# Patient Record
Sex: FEMALE | Race: WHITE | Employment: OTHER | URBAN - METROPOLITAN AREA
[De-identification: names, ages, dates, MRNs, and addresses within clinical notes are randomized per-mention and may not be internally consistent; named-entity substitution may affect disease eponyms.]

---

## 2022-03-28 ENCOUNTER — OFFICE VISIT (OUTPATIENT)
Dept: INTERNAL MEDICINE CLINIC | Age: 67
End: 2022-03-28
Payer: MEDICARE

## 2022-03-28 VITALS
SYSTOLIC BLOOD PRESSURE: 118 MMHG | WEIGHT: 238.8 LBS | HEART RATE: 133 BPM | BODY MASS INDEX: 42.31 KG/M2 | DIASTOLIC BLOOD PRESSURE: 70 MMHG | HEIGHT: 63 IN

## 2022-03-28 DIAGNOSIS — K21.00 GASTROESOPHAGEAL REFLUX DISEASE WITH ESOPHAGITIS WITHOUT HEMORRHAGE: ICD-10-CM

## 2022-03-28 DIAGNOSIS — K43.9 VENTRAL HERNIA WITHOUT OBSTRUCTION OR GANGRENE: ICD-10-CM

## 2022-03-28 DIAGNOSIS — E78.2 MIXED HYPERLIPIDEMIA: ICD-10-CM

## 2022-03-28 DIAGNOSIS — M81.0 AGE-RELATED OSTEOPOROSIS WITHOUT CURRENT PATHOLOGICAL FRACTURE: ICD-10-CM

## 2022-03-28 DIAGNOSIS — M06.9 RHEUMATOID ARTHRITIS INVOLVING MULTIPLE SITES, UNSPECIFIED WHETHER RHEUMATOID FACTOR PRESENT (HCC): ICD-10-CM

## 2022-03-28 DIAGNOSIS — Z23 NEED FOR 23-POLYVALENT PNEUMOCOCCAL POLYSACCHARIDE VACCINE: ICD-10-CM

## 2022-03-28 DIAGNOSIS — J30.2 SEASONAL ALLERGIC RHINITIS, UNSPECIFIED TRIGGER: ICD-10-CM

## 2022-03-28 DIAGNOSIS — Z12.11 COLON CANCER SCREENING: ICD-10-CM

## 2022-03-28 DIAGNOSIS — R73.01 IMPAIRED FASTING BLOOD SUGAR: ICD-10-CM

## 2022-03-28 DIAGNOSIS — Z12.31 BREAST CANCER SCREENING BY MAMMOGRAM: ICD-10-CM

## 2022-03-28 DIAGNOSIS — L98.9 SKIN LESION OF FACE: Primary | ICD-10-CM

## 2022-03-28 PROBLEM — Z86.59 HISTORY OF POSTTRAUMATIC STRESS DISORDER (PTSD): Status: ACTIVE | Noted: 2022-03-28

## 2022-03-28 LAB
A/G RATIO: 1.5 (ref 1.1–2.2)
ALBUMIN SERPL-MCNC: 4.2 G/DL (ref 3.4–5)
ALP BLD-CCNC: 100 U/L (ref 40–129)
ALT SERPL-CCNC: 15 U/L (ref 10–40)
ANION GAP SERPL CALCULATED.3IONS-SCNC: 18 MMOL/L (ref 3–16)
AST SERPL-CCNC: 13 U/L (ref 15–37)
BILIRUB SERPL-MCNC: 0.8 MG/DL (ref 0–1)
BUN BLDV-MCNC: 17 MG/DL (ref 7–20)
CALCIUM SERPL-MCNC: 9.3 MG/DL (ref 8.3–10.6)
CHLORIDE BLD-SCNC: 102 MMOL/L (ref 99–110)
CHOLESTEROL, TOTAL: 248 MG/DL (ref 0–199)
CO2: 19 MMOL/L (ref 21–32)
CREAT SERPL-MCNC: 1 MG/DL (ref 0.6–1.2)
GFR AFRICAN AMERICAN: >60
GFR NON-AFRICAN AMERICAN: 55
GLUCOSE BLD-MCNC: 139 MG/DL (ref 70–99)
HCT VFR BLD CALC: 46.8 % (ref 36–48)
HDLC SERPL-MCNC: 49 MG/DL (ref 40–60)
HEMOGLOBIN: 15.3 G/DL (ref 12–16)
HEPATITIS C ANTIBODY INTERPRETATION: NORMAL
LDL CHOLESTEROL CALCULATED: 169 MG/DL
MCH RBC QN AUTO: 27.5 PG (ref 26–34)
MCHC RBC AUTO-ENTMCNC: 32.7 G/DL (ref 31–36)
MCV RBC AUTO: 84.1 FL (ref 80–100)
PDW BLD-RTO: 14.6 % (ref 12.4–15.4)
PLATELET # BLD: 205 K/UL (ref 135–450)
PMV BLD AUTO: 8.8 FL (ref 5–10.5)
POTASSIUM SERPL-SCNC: 4.2 MMOL/L (ref 3.5–5.1)
RBC # BLD: 5.56 M/UL (ref 4–5.2)
SODIUM BLD-SCNC: 139 MMOL/L (ref 136–145)
TOTAL PROTEIN: 7 G/DL (ref 6.4–8.2)
TRIGL SERPL-MCNC: 148 MG/DL (ref 0–150)
TSH REFLEX FT4: 2.73 UIU/ML (ref 0.27–4.2)
VLDLC SERPL CALC-MCNC: 30 MG/DL
WBC # BLD: 14.5 K/UL (ref 4–11)

## 2022-03-28 PROCEDURE — 1090F PRES/ABSN URINE INCON ASSESS: CPT | Performed by: INTERNAL MEDICINE

## 2022-03-28 PROCEDURE — G8417 CALC BMI ABV UP PARAM F/U: HCPCS | Performed by: INTERNAL MEDICINE

## 2022-03-28 PROCEDURE — G8400 PT W/DXA NO RESULTS DOC: HCPCS | Performed by: INTERNAL MEDICINE

## 2022-03-28 PROCEDURE — G8427 DOCREV CUR MEDS BY ELIG CLIN: HCPCS | Performed by: INTERNAL MEDICINE

## 2022-03-28 PROCEDURE — 1036F TOBACCO NON-USER: CPT | Performed by: INTERNAL MEDICINE

## 2022-03-28 PROCEDURE — 1123F ACP DISCUSS/DSCN MKR DOCD: CPT | Performed by: INTERNAL MEDICINE

## 2022-03-28 PROCEDURE — 90732 PPSV23 VACC 2 YRS+ SUBQ/IM: CPT | Performed by: INTERNAL MEDICINE

## 2022-03-28 PROCEDURE — G0009 ADMIN PNEUMOCOCCAL VACCINE: HCPCS | Performed by: INTERNAL MEDICINE

## 2022-03-28 PROCEDURE — 3017F COLORECTAL CA SCREEN DOC REV: CPT | Performed by: INTERNAL MEDICINE

## 2022-03-28 PROCEDURE — 4040F PNEUMOC VAC/ADMIN/RCVD: CPT | Performed by: INTERNAL MEDICINE

## 2022-03-28 PROCEDURE — G8484 FLU IMMUNIZE NO ADMIN: HCPCS | Performed by: INTERNAL MEDICINE

## 2022-03-28 PROCEDURE — 99204 OFFICE O/P NEW MOD 45 MIN: CPT | Performed by: INTERNAL MEDICINE

## 2022-03-28 RX ORDER — NICOTINE POLACRILEX 4 MG/1
20 GUM, CHEWING ORAL NIGHTLY
COMMUNITY

## 2022-03-28 SDOH — ECONOMIC STABILITY: FOOD INSECURITY: WITHIN THE PAST 12 MONTHS, THE FOOD YOU BOUGHT JUST DIDN'T LAST AND YOU DIDN'T HAVE MONEY TO GET MORE.: NEVER TRUE

## 2022-03-28 SDOH — ECONOMIC STABILITY: FOOD INSECURITY: WITHIN THE PAST 12 MONTHS, YOU WORRIED THAT YOUR FOOD WOULD RUN OUT BEFORE YOU GOT MONEY TO BUY MORE.: NEVER TRUE

## 2022-03-28 ASSESSMENT — PATIENT HEALTH QUESTIONNAIRE - PHQ9
SUM OF ALL RESPONSES TO PHQ QUESTIONS 1-9: 0
1. LITTLE INTEREST OR PLEASURE IN DOING THINGS: 0
SUM OF ALL RESPONSES TO PHQ9 QUESTIONS 1 & 2: 0
SUM OF ALL RESPONSES TO PHQ QUESTIONS 1-9: 0
SUM OF ALL RESPONSES TO PHQ QUESTIONS 1-9: 0
2. FEELING DOWN, DEPRESSED OR HOPELESS: 0
SUM OF ALL RESPONSES TO PHQ QUESTIONS 1-9: 0

## 2022-03-28 ASSESSMENT — ENCOUNTER SYMPTOMS
CHEST TIGHTNESS: 0
WHEEZING: 0
SHORTNESS OF BREATH: 0
SORE THROAT: 0
ABDOMINAL PAIN: 0
BACK PAIN: 0
COLOR CHANGE: 0
RHINORRHEA: 1
VOMITING: 0
NAUSEA: 0
COUGH: 1
CONSTIPATION: 0

## 2022-03-28 ASSESSMENT — SOCIAL DETERMINANTS OF HEALTH (SDOH): HOW HARD IS IT FOR YOU TO PAY FOR THE VERY BASICS LIKE FOOD, HOUSING, MEDICAL CARE, AND HEATING?: NOT HARD AT ALL

## 2022-03-28 NOTE — ASSESSMENT & PLAN NOTE
Advised patient to start plain antihistamine therapy as cetirizine or loratadine and avoid decongestants like Zyrtec since the side effects she is experiencing are secondary to decongestants and not antihistamine.   Advised can also step up treatment by adding the nasal steroids if needed and to call the office if any new problems or concerns

## 2022-03-28 NOTE — ASSESSMENT & PLAN NOTE
This is chronic and asymptomatic however has been progressing with the weight gain. Patient wants to wait for now until she is done with the treatment of her possibly skin cancer before addressing the hernia.   Aware of possible complications as strangulation that will usually result in acute symptoms and will go to the emergency room should any develop

## 2022-03-28 NOTE — ASSESSMENT & PLAN NOTE
Will check labs, continue current dose of omeprazole 20 mg since relieving the symptoms, GERD lifestyle modification changes along with antireflux diet and need for weight loss recommended

## 2022-03-28 NOTE — PROGRESS NOTES
ASSESSMENT/PLAN:  1. Skin lesion of face  Assessment & Plan:   Explained to patient lesion suggestive of most likely basal cell carcinoma, she is scheduled to see dermatology in 6 weeks, recommend to use sunscreen when out in the sun and to follow-up with dermatology for further evaluation and treatment  2. Rheumatoid arthritis involving multiple sites, unspecified whether rheumatoid factor present [facility])  Assessment & Plan:   Currently stable and patient is coping with the symptoms, discussed with patient recommendations for weight loss and encouraged to call the office if she feels she needs reevaluation by rheumatology to consider treatment with the newer biological agents that are now available. Can continue as needed Tylenol and or NSAIDs  Orders:  -     CBC; Future  -     Comprehensive Metabolic Panel; Future  -     Hepatitis C Antibody; Future  3. Ventral hernia without obstruction or gangrene  Assessment & Plan: This is chronic and asymptomatic however has been progressing with the weight gain. Patient wants to wait for now until she is done with the treatment of her possibly skin cancer before addressing the hernia. Aware of possible complications as strangulation that will usually result in acute symptoms and will go to the emergency room should any develop  4. Seasonal allergic rhinitis, unspecified trigger  Assessment & Plan:   Advised patient to start plain antihistamine therapy as cetirizine or loratadine and avoid decongestants like Zyrtec since the side effects she is experiencing are secondary to decongestants and not antihistamine. Advised can also step up treatment by adding the nasal steroids if needed and to call the office if any new problems or concerns  5. Mixed hyperlipidemia  Assessment & Plan:    We will update labs and make recommendations accordingly, advised to maintain healthy diet along with regular exercise and attempt weight loss  Orders:  -     Comprehensive Metabolic Panel; heartburn, if she skips a dose she ends up vomiting  Noticed the skin lesion on the side of the nose few months back, states it will never heal completely and is every time it scabs over the dry scab will fall off. She has history of chronic insomnia , history of PTSD with 2 suicidal attempts in the past however this has been all clear now  She does have seasonal allergies that are currently flaring up, she has not been taking any allergy medications because they give her tremors. Review of Systems   Constitutional: Negative for activity change, appetite change, fatigue and unexpected weight change. HENT: Positive for congestion and rhinorrhea. Negative for hearing loss, mouth sores and sore throat. Eyes: Negative for visual disturbance. Respiratory: Positive for cough. Negative for chest tightness, shortness of breath and wheezing. Cardiovascular: Negative for chest pain, palpitations and leg swelling. Gastrointestinal: Negative for abdominal pain, constipation, nausea and vomiting. Endocrine: Negative for cold intolerance and heat intolerance. Genitourinary: Negative for difficulty urinating, dysuria, frequency, hematuria, urgency and vaginal bleeding. Musculoskeletal: Positive for arthralgias. Negative for back pain, gait problem, joint swelling and neck pain. Skin: Negative for color change and wound. Allergic/Immunologic: Positive for environmental allergies. Negative for immunocompromised state. Neurological: Negative for dizziness, weakness, light-headedness and headaches. Hematological: Does not bruise/bleed easily. Psychiatric/Behavioral: Positive for sleep disturbance. Negative for behavioral problems and dysphoric mood. OBJECTIVE:    /70   Pulse 133   Ht 5' 3\" (1.6 m)   Wt 238 lb 12.8 oz (108.3 kg)   BMI 42.30 kg/m²    Physical Exam  Constitutional:       General: She is not in acute distress. Appearance: Normal appearance. She is obese.  She is not toxic-appearing. HENT:      Head: Normocephalic. Eyes:      Conjunctiva/sclera: Conjunctivae normal.   Cardiovascular:      Rate and Rhythm: Normal rate and regular rhythm. Heart sounds: Normal heart sounds. Pulmonary:      Effort: Pulmonary effort is normal. No respiratory distress. Breath sounds: Normal breath sounds. No wheezing. Abdominal:      Palpations: Abdomen is soft. Hernia: A hernia is present. Comments: Large right side paraumbilical hernia that is nonreducible however nontender. Exam limited by obesity   Musculoskeletal:         General: Normal range of motion. Cervical back: Neck supple. Skin:     General: Skin is warm and dry. Findings: Lesion present. Comments: Skin lesion lateral aspect of right nostril consistent with basal cell carcinoma grossly   Neurological:      General: No focal deficit present. Mental Status: She is alert. Cranial Nerves: No cranial nerve deficit. Psychiatric:         Mood and Affect: Mood normal.         Behavior: Behavior normal.         Thought Content: Thought content normal.           Electronically signed by Antonio Cruz MD on 3/28/2022 at 10:09 AM.    This dictation was generated by voice recognition computer software. Although all attempts are made to edit the dictation for accuracy, there may be errors in the transcription that are not intended.

## 2022-03-28 NOTE — ASSESSMENT & PLAN NOTE
We will update labs and make recommendations accordingly, advised to maintain healthy diet along with regular exercise and attempt weight loss

## 2022-03-28 NOTE — ASSESSMENT & PLAN NOTE
Explained to patient lesion suggestive of most likely basal cell carcinoma, she is scheduled to see dermatology in 6 weeks, recommend to use sunscreen when out in the sun and to follow-up with dermatology for further evaluation and treatment

## 2022-03-28 NOTE — ASSESSMENT & PLAN NOTE
Currently stable and patient is coping with the symptoms, discussed with patient recommendations for weight loss and encouraged to call the office if she feels she needs reevaluation by rheumatology to consider treatment with the newer biological agents that are now available.   Can continue as needed Tylenol and or NSAIDs

## 2022-03-29 DIAGNOSIS — D72.829 LEUKOCYTOSIS, UNSPECIFIED TYPE: Primary | ICD-10-CM

## 2022-03-29 LAB
ESTIMATED AVERAGE GLUCOSE: 131.2 MG/DL
HBA1C MFR BLD: 6.2 %

## 2022-04-02 ENCOUNTER — APPOINTMENT (OUTPATIENT)
Dept: CT IMAGING | Age: 67
DRG: 683 | End: 2022-04-02
Payer: MEDICARE

## 2022-04-02 ENCOUNTER — HOSPITAL ENCOUNTER (INPATIENT)
Age: 67
LOS: 5 days | Discharge: HOME | DRG: 683 | End: 2022-04-07
Attending: FAMILY MEDICINE | Admitting: FAMILY MEDICINE
Payer: MEDICARE

## 2022-04-02 DIAGNOSIS — R11.2 NAUSEA VOMITING AND DIARRHEA: ICD-10-CM

## 2022-04-02 DIAGNOSIS — R80.9 PROTEINURIA, UNSPECIFIED TYPE: ICD-10-CM

## 2022-04-02 DIAGNOSIS — N30.00 ACUTE CYSTITIS WITHOUT HEMATURIA: ICD-10-CM

## 2022-04-02 DIAGNOSIS — R10.84 GENERALIZED ABDOMINAL PAIN: ICD-10-CM

## 2022-04-02 DIAGNOSIS — R19.7 NAUSEA VOMITING AND DIARRHEA: ICD-10-CM

## 2022-04-02 DIAGNOSIS — E86.0 SEVERE DEHYDRATION: ICD-10-CM

## 2022-04-02 DIAGNOSIS — N17.9 ACUTE RENAL FAILURE, UNSPECIFIED ACUTE RENAL FAILURE TYPE (HCC): Primary | ICD-10-CM

## 2022-04-02 PROBLEM — N19 RENAL FAILURE: Status: ACTIVE | Noted: 2022-04-02

## 2022-04-02 LAB
A/G RATIO: 1.4 (ref 1.1–2.2)
ALBUMIN SERPL-MCNC: 3.4 G/DL (ref 3.4–5)
ALBUMIN SERPL-MCNC: 4.6 G/DL (ref 3.4–5)
ALP BLD-CCNC: 93 U/L (ref 40–129)
ALT SERPL-CCNC: 20 U/L (ref 10–40)
ANION GAP SERPL CALCULATED.3IONS-SCNC: 25 MMOL/L (ref 3–16)
ANION GAP SERPL CALCULATED.3IONS-SCNC: 29 MMOL/L (ref 3–16)
AST SERPL-CCNC: 12 U/L (ref 15–37)
BACTERIA: ABNORMAL /HPF
BASOPHILS ABSOLUTE: 0 K/UL (ref 0–0.2)
BASOPHILS RELATIVE PERCENT: 0.2 %
BILIRUB SERPL-MCNC: 0.6 MG/DL (ref 0–1)
BILIRUBIN URINE: NEGATIVE
BLOOD, URINE: ABNORMAL
BUN BLDV-MCNC: 108 MG/DL (ref 7–20)
BUN BLDV-MCNC: 116 MG/DL (ref 7–20)
CALCIUM SERPL-MCNC: 7.5 MG/DL (ref 8.3–10.6)
CALCIUM SERPL-MCNC: 8.4 MG/DL (ref 8.3–10.6)
CHLORIDE BLD-SCNC: 86 MMOL/L (ref 99–110)
CHLORIDE BLD-SCNC: 89 MMOL/L (ref 99–110)
CLARITY: ABNORMAL
CO2: 14 MMOL/L (ref 21–32)
CO2: 16 MMOL/L (ref 21–32)
COLOR: YELLOW
CREAT SERPL-MCNC: 6.6 MG/DL (ref 0.6–1.2)
CREAT SERPL-MCNC: 7.1 MG/DL (ref 0.6–1.2)
EOSINOPHILS ABSOLUTE: 0 K/UL (ref 0–0.6)
EOSINOPHILS RELATIVE PERCENT: 0.1 %
EPITHELIAL CELLS, UA: 1 /HPF (ref 0–5)
GFR AFRICAN AMERICAN: 7
GFR AFRICAN AMERICAN: 8
GFR NON-AFRICAN AMERICAN: 6
GFR NON-AFRICAN AMERICAN: 6
GLUCOSE BLD-MCNC: 128 MG/DL (ref 70–99)
GLUCOSE BLD-MCNC: 185 MG/DL (ref 70–99)
GLUCOSE URINE: NEGATIVE MG/DL
HCT VFR BLD CALC: 51.1 % (ref 36–48)
HEMOGLOBIN: 17.3 G/DL (ref 12–16)
HYALINE CASTS: 6 /LPF (ref 0–8)
KETONES, URINE: NEGATIVE MG/DL
LEUKOCYTE ESTERASE, URINE: ABNORMAL
LIPASE: 594 U/L (ref 13–60)
LIPASE: 689 U/L (ref 13–60)
LYMPHOCYTES ABSOLUTE: 1.4 K/UL (ref 1–5.1)
LYMPHOCYTES RELATIVE PERCENT: 10.6 %
MAGNESIUM: 2 MG/DL (ref 1.8–2.4)
MCH RBC QN AUTO: 27.4 PG (ref 26–34)
MCHC RBC AUTO-ENTMCNC: 33.9 G/DL (ref 31–36)
MCV RBC AUTO: 80.8 FL (ref 80–100)
MICROSCOPIC EXAMINATION: YES
MONOCYTES ABSOLUTE: 1 K/UL (ref 0–1.3)
MONOCYTES RELATIVE PERCENT: 7.8 %
NEUTROPHILS ABSOLUTE: 10.8 K/UL (ref 1.7–7.7)
NEUTROPHILS RELATIVE PERCENT: 81.3 %
NITRITE, URINE: POSITIVE
PDW BLD-RTO: 14.1 % (ref 12.4–15.4)
PH UA: 5 (ref 5–8)
PHOSPHORUS: 7.9 MG/DL (ref 2.5–4.9)
PLATELET # BLD: 368 K/UL (ref 135–450)
PMV BLD AUTO: 8.9 FL (ref 5–10.5)
POTASSIUM REFLEX MAGNESIUM: 3.2 MMOL/L (ref 3.5–5.1)
POTASSIUM SERPL-SCNC: 2.9 MMOL/L (ref 3.5–5.1)
PROTEIN UA: 30 MG/DL
RBC # BLD: 6.32 M/UL (ref 4–5.2)
RBC UA: ABNORMAL /HPF (ref 0–4)
SODIUM BLD-SCNC: 129 MMOL/L (ref 136–145)
SODIUM BLD-SCNC: 130 MMOL/L (ref 136–145)
SPECIFIC GRAVITY UA: 1.02 (ref 1–1.03)
TOTAL PROTEIN: 8 G/DL (ref 6.4–8.2)
URINE REFLEX TO CULTURE: YES
URINE TYPE: ABNORMAL
UROBILINOGEN, URINE: 0.2 E.U./DL
WBC # BLD: 13.3 K/UL (ref 4–11)
WBC UA: 94 /HPF (ref 0–5)

## 2022-04-02 PROCEDURE — 6360000004 HC RX CONTRAST MEDICATION: Performed by: NURSE PRACTITIONER

## 2022-04-02 PROCEDURE — 2580000003 HC RX 258: Performed by: FAMILY MEDICINE

## 2022-04-02 PROCEDURE — 36415 COLL VENOUS BLD VENIPUNCTURE: CPT

## 2022-04-02 PROCEDURE — 96374 THER/PROPH/DIAG INJ IV PUSH: CPT

## 2022-04-02 PROCEDURE — 81001 URINALYSIS AUTO W/SCOPE: CPT

## 2022-04-02 PROCEDURE — 2060000000 HC ICU INTERMEDIATE R&B

## 2022-04-02 PROCEDURE — 6360000002 HC RX W HCPCS: Performed by: NURSE PRACTITIONER

## 2022-04-02 PROCEDURE — 85025 COMPLETE CBC W/AUTO DIFF WBC: CPT

## 2022-04-02 PROCEDURE — 99285 EMERGENCY DEPT VISIT HI MDM: CPT

## 2022-04-02 PROCEDURE — 96361 HYDRATE IV INFUSION ADD-ON: CPT

## 2022-04-02 PROCEDURE — 82040 ASSAY OF SERUM ALBUMIN: CPT

## 2022-04-02 PROCEDURE — 83690 ASSAY OF LIPASE: CPT

## 2022-04-02 PROCEDURE — 87186 SC STD MICRODIL/AGAR DIL: CPT

## 2022-04-02 PROCEDURE — U0005 INFEC AGEN DETEC AMPLI PROBE: HCPCS

## 2022-04-02 PROCEDURE — 96375 TX/PRO/DX INJ NEW DRUG ADDON: CPT

## 2022-04-02 PROCEDURE — 80053 COMPREHEN METABOLIC PANEL: CPT

## 2022-04-02 PROCEDURE — 6370000000 HC RX 637 (ALT 250 FOR IP): Performed by: FAMILY MEDICINE

## 2022-04-02 PROCEDURE — 83735 ASSAY OF MAGNESIUM: CPT

## 2022-04-02 PROCEDURE — 51702 INSERT TEMP BLADDER CATH: CPT

## 2022-04-02 PROCEDURE — 87086 URINE CULTURE/COLONY COUNT: CPT

## 2022-04-02 PROCEDURE — 6360000002 HC RX W HCPCS: Performed by: FAMILY MEDICINE

## 2022-04-02 PROCEDURE — U0003 INFECTIOUS AGENT DETECTION BY NUCLEIC ACID (DNA OR RNA); SEVERE ACUTE RESPIRATORY SYNDROME CORONAVIRUS 2 (SARS-COV-2) (CORONAVIRUS DISEASE [COVID-19]), AMPLIFIED PROBE TECHNIQUE, MAKING USE OF HIGH THROUGHPUT TECHNOLOGIES AS DESCRIBED BY CMS-2020-01-R: HCPCS

## 2022-04-02 PROCEDURE — 84100 ASSAY OF PHOSPHORUS: CPT

## 2022-04-02 PROCEDURE — 87077 CULTURE AEROBIC IDENTIFY: CPT

## 2022-04-02 PROCEDURE — 74177 CT ABD & PELVIS W/CONTRAST: CPT

## 2022-04-02 PROCEDURE — 2580000003 HC RX 258: Performed by: NURSE PRACTITIONER

## 2022-04-02 RX ORDER — SODIUM CHLORIDE 0.9 % (FLUSH) 0.9 %
5-40 SYRINGE (ML) INJECTION EVERY 12 HOURS SCHEDULED
Status: DISCONTINUED | OUTPATIENT
Start: 2022-04-02 | End: 2022-04-07 | Stop reason: HOSPADM

## 2022-04-02 RX ORDER — SODIUM CHLORIDE 9 MG/ML
INJECTION, SOLUTION INTRAVENOUS PRN
Status: DISCONTINUED | OUTPATIENT
Start: 2022-04-02 | End: 2022-04-07 | Stop reason: HOSPADM

## 2022-04-02 RX ORDER — SODIUM CHLORIDE, SODIUM LACTATE, POTASSIUM CHLORIDE, CALCIUM CHLORIDE 600; 310; 30; 20 MG/100ML; MG/100ML; MG/100ML; MG/100ML
INJECTION, SOLUTION INTRAVENOUS CONTINUOUS
Status: DISCONTINUED | OUTPATIENT
Start: 2022-04-02 | End: 2022-04-03 | Stop reason: ALTCHOICE

## 2022-04-02 RX ORDER — ACETAMINOPHEN 650 MG/1
650 SUPPOSITORY RECTAL EVERY 6 HOURS PRN
Status: DISCONTINUED | OUTPATIENT
Start: 2022-04-02 | End: 2022-04-07 | Stop reason: HOSPADM

## 2022-04-02 RX ORDER — MONTELUKAST SODIUM 10 MG/1
10 TABLET ORAL NIGHTLY
COMMUNITY
End: 2022-06-08 | Stop reason: ALTCHOICE

## 2022-04-02 RX ORDER — SODIUM CHLORIDE, SODIUM LACTATE, POTASSIUM CHLORIDE, CALCIUM CHLORIDE 600; 310; 30; 20 MG/100ML; MG/100ML; MG/100ML; MG/100ML
1000 INJECTION, SOLUTION INTRAVENOUS ONCE
Status: COMPLETED | OUTPATIENT
Start: 2022-04-02 | End: 2022-04-02

## 2022-04-02 RX ORDER — MORPHINE SULFATE 4 MG/ML
4 INJECTION, SOLUTION INTRAMUSCULAR; INTRAVENOUS ONCE
Status: COMPLETED | OUTPATIENT
Start: 2022-04-02 | End: 2022-04-02

## 2022-04-02 RX ORDER — ONDANSETRON 2 MG/ML
4 INJECTION INTRAMUSCULAR; INTRAVENOUS ONCE
Status: COMPLETED | OUTPATIENT
Start: 2022-04-02 | End: 2022-04-02

## 2022-04-02 RX ORDER — PANTOPRAZOLE SODIUM 40 MG/1
40 TABLET, DELAYED RELEASE ORAL
Status: DISCONTINUED | OUTPATIENT
Start: 2022-04-03 | End: 2022-04-07 | Stop reason: HOSPADM

## 2022-04-02 RX ORDER — SODIUM CHLORIDE 0.9 % (FLUSH) 0.9 %
5-40 SYRINGE (ML) INJECTION PRN
Status: DISCONTINUED | OUTPATIENT
Start: 2022-04-02 | End: 2022-04-07 | Stop reason: HOSPADM

## 2022-04-02 RX ORDER — POLYETHYLENE GLYCOL 3350 17 G/17G
17 POWDER, FOR SOLUTION ORAL DAILY PRN
Status: DISCONTINUED | OUTPATIENT
Start: 2022-04-02 | End: 2022-04-07 | Stop reason: HOSPADM

## 2022-04-02 RX ORDER — ONDANSETRON 2 MG/ML
4 INJECTION INTRAMUSCULAR; INTRAVENOUS EVERY 6 HOURS PRN
Status: DISCONTINUED | OUTPATIENT
Start: 2022-04-02 | End: 2022-04-07 | Stop reason: HOSPADM

## 2022-04-02 RX ORDER — AMLODIPINE BESYLATE 5 MG/1
5 TABLET ORAL NIGHTLY
Status: DISCONTINUED | OUTPATIENT
Start: 2022-04-02 | End: 2022-04-07 | Stop reason: HOSPADM

## 2022-04-02 RX ORDER — ONDANSETRON 4 MG/1
4 TABLET, ORALLY DISINTEGRATING ORAL EVERY 8 HOURS PRN
Status: DISCONTINUED | OUTPATIENT
Start: 2022-04-02 | End: 2022-04-07 | Stop reason: HOSPADM

## 2022-04-02 RX ORDER — ACETAMINOPHEN 325 MG/1
650 TABLET ORAL EVERY 6 HOURS PRN
Status: DISCONTINUED | OUTPATIENT
Start: 2022-04-02 | End: 2022-04-07 | Stop reason: HOSPADM

## 2022-04-02 RX ORDER — HEPARIN SODIUM 5000 [USP'U]/ML
5000 INJECTION, SOLUTION INTRAVENOUS; SUBCUTANEOUS EVERY 8 HOURS SCHEDULED
Status: DISCONTINUED | OUTPATIENT
Start: 2022-04-02 | End: 2022-04-04

## 2022-04-02 RX ADMIN — HEPARIN SODIUM 5000 UNITS: 5000 INJECTION INTRAVENOUS; SUBCUTANEOUS at 17:00

## 2022-04-02 RX ADMIN — AMLODIPINE BESYLATE 5 MG: 5 TABLET ORAL at 22:39

## 2022-04-02 RX ADMIN — SODIUM CHLORIDE, POTASSIUM CHLORIDE, SODIUM LACTATE AND CALCIUM CHLORIDE 1000 ML: 600; 310; 30; 20 INJECTION, SOLUTION INTRAVENOUS at 10:36

## 2022-04-02 RX ADMIN — Medication 1000 MG: at 15:20

## 2022-04-02 RX ADMIN — ONDANSETRON 4 MG: 2 INJECTION INTRAMUSCULAR; INTRAVENOUS at 10:28

## 2022-04-02 RX ADMIN — HEPARIN SODIUM 5000 UNITS: 5000 INJECTION INTRAVENOUS; SUBCUTANEOUS at 22:39

## 2022-04-02 RX ADMIN — MORPHINE SULFATE 4 MG: 4 INJECTION INTRAVENOUS at 10:28

## 2022-04-02 RX ADMIN — SODIUM CHLORIDE, POTASSIUM CHLORIDE, SODIUM LACTATE AND CALCIUM CHLORIDE 1000 ML: 600; 310; 30; 20 INJECTION, SOLUTION INTRAVENOUS at 13:31

## 2022-04-02 RX ADMIN — ONDANSETRON 4 MG: 2 INJECTION INTRAMUSCULAR; INTRAVENOUS at 20:59

## 2022-04-02 RX ADMIN — ACETAMINOPHEN 650 MG: 325 TABLET ORAL at 20:51

## 2022-04-02 RX ADMIN — SODIUM CHLORIDE, POTASSIUM CHLORIDE, SODIUM LACTATE AND CALCIUM CHLORIDE: 600; 310; 30; 20 INJECTION, SOLUTION INTRAVENOUS at 20:50

## 2022-04-02 RX ADMIN — SODIUM CHLORIDE, POTASSIUM CHLORIDE, SODIUM LACTATE AND CALCIUM CHLORIDE 150 ML/HR: 600; 310; 30; 20 INJECTION, SOLUTION INTRAVENOUS at 13:23

## 2022-04-02 RX ADMIN — IOPAMIDOL 75 ML: 755 INJECTION, SOLUTION INTRAVENOUS at 11:07

## 2022-04-02 ASSESSMENT — PAIN DESCRIPTION - LOCATION
LOCATION: ABDOMEN
LOCATION: ABDOMEN

## 2022-04-02 ASSESSMENT — PAIN SCALES - GENERAL
PAINLEVEL_OUTOF10: 7
PAINLEVEL_OUTOF10: 0
PAINLEVEL_OUTOF10: 7

## 2022-04-02 ASSESSMENT — ENCOUNTER SYMPTOMS
COUGH: 0
NAUSEA: 1
SHORTNESS OF BREATH: 0
ABDOMINAL PAIN: 1
COLOR CHANGE: 0
VOMITING: 1
WHEEZING: 0
BACK PAIN: 0
DIARRHEA: 1

## 2022-04-02 ASSESSMENT — PAIN DESCRIPTION - PAIN TYPE
TYPE: ACUTE PAIN
TYPE: ACUTE PAIN

## 2022-04-02 NOTE — ED PROVIDER NOTES
**ADVANCED PRACTICE PROVIDER, I HAVE EVALUATED THIS PATIENT**        [address] ENCOUNTER      Pt Name: Blanca Bey  QVO:[id number]  [place] 1955  Date of evaluation: 4/2/2022  Provider: STEVEN Friedman CNP      Chief Complaint:    Chief Complaint   Patient presents with    Emesis     pt states started with sinus congestion a weeks ago, havn't eaten in 1 weeks due to emesis and diarrhea, has been able to drink pedialyte, pt states has hernia, now with abd pain         Nursing Notes, Past Medical Hx, Past Surgical Hx, Social Hx, Allergies, and Family Hx were all reviewed and agreed with or any disagreements were addressed in the HPI.    HPI: (Location, Duration, Timing, Severity, Quality, Assoc Sx, Context, Modifying factors)    Chief Complaint of abdominal cramping associate with nausea vomiting    This is a  74 y.o. female who presents today with sinus congestion that has been going on for the past few weeks, she was seen by new PCP on Monday, she was started on Singulair and over-the-counter cough medicine however, since Monday night she had nausea vomiting and diarrhea along with intermittent abdominal cramping, she states that she is throwing up and having diarrhea daily, about 5-6 episodes a day. She denies any chest pain pleuritic chest pain or shortness of breath. She states that she has a known abdominal hernia. She states that she is not been able to eat or drink, she tries to drink Pedialyte however is not really helping and she feels like she is feeling worse. She rates her abdominal pain a 7 out of 10, has not taking anything for the pain. She denies any additional complaints, does report the sinus congestion has improved over the past couple of days, she has no chest pain pleuritic chest pain or shortness of breath.   No known fever or chills, no urinary symptoms, no additional complaints, numbness medications, no additional aggravating or relieving factors. Patient presents awake, alert and in no acute distress or toxic appearance. PastMedical/Surgical History:      Diagnosis Date    PTSD (post-traumatic stress disorder)     Rheumatoid arthritis ([address]      History reviewed. No pertinent surgical history. Medications:  Previous Medications    OMEPRAZOLE 20 MG EC TABLET    Take 20 mg by mouth daily         Review of Systems:  (2-9 systems needed)  Review of Systems   Constitutional: Negative for chills and fever. HENT: Negative for congestion. Patient states she is been dealing with sinus congestion off and on since the past couple of weeks, denies any headache neck pain or neck stiffness   Respiratory: Negative for cough, shortness of breath and wheezing. Cardiovascular: Negative for chest pain. Gastrointestinal: Positive for abdominal pain, diarrhea, nausea and vomiting. However, since Tuesday she has been with intermittent abdominal cramping associate with nausea vomiting, and diarrhea, states she has been vomiting or having diarrhea about 5-6 times a day. Genitourinary: Negative for difficulty urinating, dysuria, frequency, hematuria and urgency. Musculoskeletal: Negative for back pain. Skin: Negative for color change. Neurological: Negative for weakness, numbness and headaches. She does report feeling weak, fatigue, but denies any headache neck pain or neck stiffness       \"Positives and Pertinent negatives as per HPI\"    Physical Exam:  Physical Exam  Vitals and nursing note reviewed. Constitutional:       Appearance: She is well-developed. She is not diaphoretic. HENT:      Head: Normocephalic. Right Ear: External ear normal.      Left Ear: External ear normal.   Eyes:      General: No scleral icterus. Right eye: No discharge. Left eye: No discharge. Cardiovascular:      Rate and Rhythm: Normal rate.       Comments: Normal S1 and 2, peripheral pulses 2+, no edema of the  Pulmonary:      Effort: Pulmonary effort is normal. No respiratory distress. Breath sounds: Normal breath sounds. Abdominal:      Palpations: Abdomen is soft. Tenderness: There is abdominal tenderness. Comments: Abdomen is soft and nondistended. Bowel sounds are hypoactive, patient does have an umbilical hernia that is soft and reproducible however immediately returns outward, she is having abdominal cramping mostly in the epigastric region, no ascites or rigidity   Musculoskeletal:         General: Normal range of motion. Cervical back: Normal range of motion and neck supple. Skin:     General: Skin is warm. Capillary Refill: Capillary refill takes less than 2 seconds. Coloration: Skin is not pale. Neurological:      General: No focal deficit present. Mental Status: She is alert and oriented to person, place, and time. GCS: GCS eye subscore is 4. GCS verbal subscore is 5. GCS motor subscore is 6.       Comments: Patient is awake, alert following commands correctly, neurology intact no focal deficits   Psychiatric:         Behavior: Behavior normal.         MEDICAL DECISION MAKING    Vitals:    Vitals:    04/02/22 1130 04/02/22 1145 04/02/22 1200 04/02/22 1330   BP: (!) 150/110 (!) 187/119 (!) 148/91 (!) 143/87   Pulse: 73 73 78 81   Resp: 16 14 24 18   Temp:       TempSrc:       SpO2: 97% 97% 95% 95%   Weight:       Height:           LABS:  Labs Reviewed   CBC WITH AUTO DIFFERENTIAL - Abnormal; Notable for the following components:       Result Value    WBC 13.3 (*)     RBC 6.32 (*)     Hemoglobin 17.3 (*)     Hematocrit 51.1 (*)     Neutrophils Absolute 10.8 (*)     All other components within normal limits   COMPREHENSIVE METABOLIC PANEL W/ REFLEX TO MG FOR LOW K - Abnormal; Notable for the following components:    Sodium 129 (*)     Potassium reflex Magnesium 3.2 (*)     Chloride 86 (*)     CO2 14 (*)     Anion Gap 29 (*)     Glucose 185 (*)  (*)     CREATININE 7.1 (*)     GFR Non- 6 (*)     GFR  7 (*)     AST 12 (*)     All other components within normal limits    Narrative:     Hailey Ulloa  [facility] tel. [phone],  Chemistry results called to and read back by Jackson Jaime, 04/02/2022  10:55, by Sofia Sneed - Abnormal; Notable for the following components:    Lipase 689.0 (*)     All other components within normal limits    Narrative:     Hailey Ulloa  [facility] tel. [phone],  Chemistry results called to and read back by Jackson Jaime, 04/02/2022  10:55, by [employer] - Abnormal; Notable for the following components:    Blood, Urine MODERATE (*)     Protein, UA 30 (*)     Nitrite, Urine POSITIVE (*)     Leukocyte Esterase, Urine MODERATE (*)     All other components within normal limits   MICROSCOPIC URINALYSIS - Abnormal; Notable for the following components:    Bacteria, UA 4+ (*)     WBC, UA 94 (*)     All other components within normal limits   CULTURE, URINE   CULTURE, URINE   MAGNESIUM    Narrative:     Hailey Ulloa  [facility] tel. [phone],  Chemistry results called to and read back by Jackson Jaime, 04/02/2022  10:55, by Joe Stewart of labs reviewed and were negative at this time or not returned at the time of this note. RADIOLOGY:   Non-plain film images such as CT, Ultrasound and MRI are read by the radiologist. Noni ERAZO, APRN - CNP have directly visualized the radiologic plain film image(s) with the below findings:      Interpretation per the Radiologist below, if available at the time of this note:    CT ABDOMEN PELVIS W IV CONTRAST Additional Contrast? None   Final Result   1. Mild infectious or inflammatory enterocolitis to include inflammatory   bowel disease. 2. Abnormal endometrial thickening to 1.3 cm. Recommend further evaluation   with nonemergent pelvic ultrasound.               MEDICAL DECISION MAKING / ED COURSE:    Because of high probability of sudden clinical deterioration of the patient's condition and risk of further deterioration, critical care time required my full attention to the patient's condition; which included chart data review, documentation, medication ordering, reviewing the patient's old records, reevaluation patient's cardiac, pulmonary and neurological status. Reevaluation of vital signs. Consultations with ED attending and admitting physician. Ordering, interpreting reviewing diagnostic testing. Therefore a critical care time was 42 minutes of direct attention to the patient's condition did not include time spent on procedures. PROCEDURES:   Procedures    None    Patient was given:  Medications   lactated ringers infusion (150 mL/hr IntraVENous New Bag 4/2/22 1323)   cefTRIAXone (ROCEPHIN) 1000 mg in sterile water 10 mL IV syringe (has no administration in time range)   ondansetron (ZOFRAN) injection 4 mg (4 mg IntraVENous Given 4/2/22 1028)   morphine injection 4 mg (4 mg IntraVENous Given 4/2/22 1028)   lactated ringers infusion 1,000 mL (0 mLs IntraVENous Stopped 4/2/22 1200)   iopamidol (ISOVUE-370) 76 % injection 75 mL (75 mLs IntraVENous Given 4/2/22 1107)   lactated ringers infusion 1,000 mL (1,000 mLs IntraVENous New Bag 4/2/22 1331)     Patient presents with sinus congestion that has been going on for the past few weeks, she was seen by new PCP on Monday, she was started on Singulair and over-the-counter cough medicine however, since Monday night she had nausea vomiting and diarrhea along with intermittent abdominal cramping, she states that she is throwing up and having diarrhea daily, about 5-6 episodes a day. She denies any chest pain pleuritic chest pain or shortness of breath. She states that she has a known abdominal hernia. She states that she is not been able to eat or drink, she tries to drink Pedialyte.     After evaluation and examination the patient IV access, IV fluids, blood work, urinalysis, medications and CT scan of the abdomen was ordered. Urinalysis is positive for nitrates and proteinuria, 94 WBCs, patient was ordered IV Rocephin and urine sent for culturing. CBC shows a slight leukocytosis with a white count of 13,300, no acute sepsis. Patient's metabolic panel shows a new acute renal failure with a BUN of 116, creatinine 7.1, GFR of 6, her gap of 29, potassium 3.2 and sodium of 129 this would correlate with her chloride being 86, I then added on a mag level, I consulted nephrology due to her new acute renal failure as the patient just had normal kidney function on the March 28, 2022 at that time her BUN was 17, creatinine was 1 and GFR was 55 I do believe she is severely dehydrated, she was already ordered a liter of fluid. Her liver functions are normal except her lipase is 689, reflex magnesium level is 2.  CT the abdomen shows mild infectious or inflammatory enterocolitis possibly inflammatory bowel disease this is probably related to her nausea and vomiting and diarrhea she has been having however because of the new ARF consulting nephrology. At 1220 I spoke with Dr. Damari Davis, nephrology on-call, we discussed the patient's case at length, he agrees with plan of care the patient due to being admitted, he request patient have inserted Gutiérrez catheter monitor I&O, in addition to doing 150 mL an hour. I then spoke with nephrology again at [phone], he recommends that the patient should have 1 L that was already infused, second liter wide over 30 minutes, third liter run at 500 mL's an hour, fourth liter run at 150 mL's per hour and then on out in order to sustain kidney function. However, this was all ordered, along with indwelling Gutiérrez catheter. Hospitalist was paged via [facility] for admission. Patient will be admitted to hospital for further evaluation management of care. The patient tolerated their visit well. I evaluated the patient.   The physician was available for consultation as needed. The patient and / or the family were informed of the results of any tests, a time was given to answer questions, a plan was proposed and they agreed with plan. CLINICAL IMPRESSION:  1. Acute renal failure, unspecified acute renal failure type ([address]    2. Severe dehydration    3. Acute cystitis without hematuria    4. Proteinuria, unspecified type    5. Generalized abdominal pain    6. Nausea vomiting and diarrhea        DISPOSITION Admitted 04/02/2022 02:51:19 PM      PATIENT REFERRED TO:  No follow-up provider specified.     DISCHARGE MEDICATIONS:  New Prescriptions    No medications on file       DISCONTINUED MEDICATIONS:  Discontinued Medications    No medications on file              (Please note the MDM and HPI sections of this note were completed with a voice recognition program.  Efforts were made to edit the dictations but occasionally words are mis-transcribed.)    Electronically signed, STEVEN Acosta CNP,          STEVEN Acosta CNP  04/02/22 7100

## 2022-04-02 NOTE — PROGRESS NOTES
Called 3T for report to get patient upstairs- told that \"Nurse is supposed to get report at the bedside and she will be down there shortly. \"

## 2022-04-02 NOTE — LETTER
[address] [id number]  Phone: [phone]    No name on file. April 6, 2022     Patient: David Felipe   YOB: 1955   Date of Visit: 4/2/2022       To Whom It May Concern: It is my medical opinion that Inge Jara {Work release (duty restriction):67923}. If you have any questions or concerns, please don't hesitate to call. Sincerely,        No name on file.

## 2022-04-02 NOTE — PROGRESS NOTES
Pt admitted to [phone]. Plan of care discussed. Pt in CDIFF precaution due to admission with diarrhea. Bed alarm engaged, call light within reach, instructed to call with needs.

## 2022-04-02 NOTE — PROGRESS NOTES
Attempted to do bedside report with admitting RN and second RN. When I directly asked both of them, I was told, \"No,\" as they proceeded to take the patient through the double doors exiting the ED. ED Charge RN aware.

## 2022-04-03 LAB
ALBUMIN SERPL-MCNC: 3.3 G/DL (ref 3.4–5)
ANION GAP SERPL CALCULATED.3IONS-SCNC: 21 MMOL/L (ref 3–16)
ANION GAP SERPL CALCULATED.3IONS-SCNC: 23 MMOL/L (ref 3–16)
BUN BLDV-MCNC: 104 MG/DL (ref 7–20)
BUN BLDV-MCNC: 93 MG/DL (ref 7–20)
C DIFF TOXIN/ANTIGEN: NORMAL
CALCIUM SERPL-MCNC: 7.7 MG/DL (ref 8.3–10.6)
CALCIUM SERPL-MCNC: 7.8 MG/DL (ref 8.3–10.6)
CHLORIDE BLD-SCNC: 91 MMOL/L (ref 99–110)
CHLORIDE BLD-SCNC: 93 MMOL/L (ref 99–110)
CO2: 17 MMOL/L (ref 21–32)
CO2: 17 MMOL/L (ref 21–32)
CREAT SERPL-MCNC: 5.9 MG/DL (ref 0.6–1.2)
CREAT SERPL-MCNC: 6.6 MG/DL (ref 0.6–1.2)
GFR AFRICAN AMERICAN: 8
GFR AFRICAN AMERICAN: 9
GFR NON-AFRICAN AMERICAN: 6
GFR NON-AFRICAN AMERICAN: 7
GLUCOSE BLD-MCNC: 106 MG/DL (ref 70–99)
GLUCOSE BLD-MCNC: 115 MG/DL (ref 70–99)
HCT VFR BLD CALC: 41.8 % (ref 36–48)
HEMOGLOBIN: 14.2 G/DL (ref 12–16)
MAGNESIUM: 1.7 MG/DL (ref 1.8–2.4)
MCH RBC QN AUTO: 27.4 PG (ref 26–34)
MCHC RBC AUTO-ENTMCNC: 33.9 G/DL (ref 31–36)
MCV RBC AUTO: 80.7 FL (ref 80–100)
PDW BLD-RTO: 14.3 % (ref 12.4–15.4)
PHOSPHORUS: 7.5 MG/DL (ref 2.5–4.9)
PLATELET # BLD: 258 K/UL (ref 135–450)
PMV BLD AUTO: 8.8 FL (ref 5–10.5)
POTASSIUM REFLEX MAGNESIUM: 3 MMOL/L (ref 3.5–5.1)
POTASSIUM SERPL-SCNC: 2.9 MMOL/L (ref 3.5–5.1)
RBC # BLD: 5.18 M/UL (ref 4–5.2)
SODIUM BLD-SCNC: 131 MMOL/L (ref 136–145)
SODIUM BLD-SCNC: 131 MMOL/L (ref 136–145)
WBC # BLD: 6.8 K/UL (ref 4–11)

## 2022-04-03 PROCEDURE — 36410 VNPNXR 3YR/> PHY/QHP DX/THER: CPT

## 2022-04-03 PROCEDURE — 05HB33Z INSERTION OF INFUSION DEVICE INTO RIGHT BASILIC VEIN, PERCUTANEOUS APPROACH: ICD-10-PCS | Performed by: FAMILY MEDICINE

## 2022-04-03 PROCEDURE — 2580000003 HC RX 258: Performed by: FAMILY MEDICINE

## 2022-04-03 PROCEDURE — 6370000000 HC RX 637 (ALT 250 FOR IP): Performed by: PEDIATRICS

## 2022-04-03 PROCEDURE — 2580000003 HC RX 258: Performed by: INTERNAL MEDICINE

## 2022-04-03 PROCEDURE — 36569 INSJ PICC 5 YR+ W/O IMAGING: CPT

## 2022-04-03 PROCEDURE — 80069 RENAL FUNCTION PANEL: CPT

## 2022-04-03 PROCEDURE — 83735 ASSAY OF MAGNESIUM: CPT

## 2022-04-03 PROCEDURE — 85027 COMPLETE CBC AUTOMATED: CPT

## 2022-04-03 PROCEDURE — 6360000002 HC RX W HCPCS: Performed by: FAMILY MEDICINE

## 2022-04-03 PROCEDURE — 87040 BLOOD CULTURE FOR BACTERIA: CPT

## 2022-04-03 PROCEDURE — 2500000003 HC RX 250 WO HCPCS: Performed by: NURSE PRACTITIONER

## 2022-04-03 PROCEDURE — 2060000000 HC ICU INTERMEDIATE R&B

## 2022-04-03 PROCEDURE — 87449 NOS EACH ORGANISM AG IA: CPT

## 2022-04-03 PROCEDURE — 2500000003 HC RX 250 WO HCPCS: Performed by: INTERNAL MEDICINE

## 2022-04-03 PROCEDURE — C1751 CATH, INF, PER/CENT/MIDLINE: HCPCS

## 2022-04-03 PROCEDURE — 87505 NFCT AGENT DETECTION GI: CPT

## 2022-04-03 PROCEDURE — 36415 COLL VENOUS BLD VENIPUNCTURE: CPT

## 2022-04-03 PROCEDURE — 87324 CLOSTRIDIUM AG IA: CPT

## 2022-04-03 PROCEDURE — 6360000002 HC RX W HCPCS: Performed by: INTERNAL MEDICINE

## 2022-04-03 PROCEDURE — 6370000000 HC RX 637 (ALT 250 FOR IP): Performed by: FAMILY MEDICINE

## 2022-04-03 RX ORDER — POTASSIUM CHLORIDE 7.45 MG/ML
10 INJECTION INTRAVENOUS
Status: COMPLETED | OUTPATIENT
Start: 2022-04-03 | End: 2022-04-03

## 2022-04-03 RX ORDER — POTASSIUM CHLORIDE 20 MEQ/1
20 TABLET, EXTENDED RELEASE ORAL ONCE
Status: COMPLETED | OUTPATIENT
Start: 2022-04-03 | End: 2022-04-03

## 2022-04-03 RX ORDER — LIDOCAINE HYDROCHLORIDE 10 MG/ML
5 INJECTION, SOLUTION EPIDURAL; INFILTRATION; INTRACAUDAL; PERINEURAL ONCE
Status: DISCONTINUED | OUTPATIENT
Start: 2022-04-03 | End: 2022-04-07 | Stop reason: HOSPADM

## 2022-04-03 RX ORDER — POTASSIUM CHLORIDE 20 MEQ/1
40 TABLET, EXTENDED RELEASE ORAL PRN
Status: DISCONTINUED | OUTPATIENT
Start: 2022-04-03 | End: 2022-04-03

## 2022-04-03 RX ORDER — SODIUM CHLORIDE 9 MG/ML
25 INJECTION, SOLUTION INTRAVENOUS PRN
Status: DISCONTINUED | OUTPATIENT
Start: 2022-04-03 | End: 2022-04-07 | Stop reason: HOSPADM

## 2022-04-03 RX ORDER — POTASSIUM CHLORIDE 7.45 MG/ML
10 INJECTION INTRAVENOUS PRN
Status: DISCONTINUED | OUTPATIENT
Start: 2022-04-03 | End: 2022-04-03

## 2022-04-03 RX ORDER — SODIUM CHLORIDE 0.9 % (FLUSH) 0.9 %
5-40 SYRINGE (ML) INJECTION EVERY 12 HOURS SCHEDULED
Status: DISCONTINUED | OUTPATIENT
Start: 2022-04-03 | End: 2022-04-07 | Stop reason: HOSPADM

## 2022-04-03 RX ORDER — SODIUM CHLORIDE 0.9 % (FLUSH) 0.9 %
5-40 SYRINGE (ML) INJECTION PRN
Status: DISCONTINUED | OUTPATIENT
Start: 2022-04-03 | End: 2022-04-07 | Stop reason: HOSPADM

## 2022-04-03 RX ADMIN — POTASSIUM CHLORIDE 20 MEQ: 20 TABLET, EXTENDED RELEASE ORAL at 06:40

## 2022-04-03 RX ADMIN — SODIUM BICARBONATE: 84 INJECTION, SOLUTION INTRAVENOUS at 15:11

## 2022-04-03 RX ADMIN — Medication 10 MEQ: at 15:17

## 2022-04-03 RX ADMIN — Medication 10 MEQ: at 16:10

## 2022-04-03 RX ADMIN — Medication 1000 MG: at 14:15

## 2022-04-03 RX ADMIN — AMLODIPINE BESYLATE 5 MG: 5 TABLET ORAL at 21:32

## 2022-04-03 RX ADMIN — PANTOPRAZOLE SODIUM 40 MG: 40 TABLET, DELAYED RELEASE ORAL at 06:01

## 2022-04-03 RX ADMIN — Medication 10 MEQ: at 17:27

## 2022-04-03 RX ADMIN — HEPARIN SODIUM 5000 UNITS: 5000 INJECTION INTRAVENOUS; SUBCUTANEOUS at 14:15

## 2022-04-03 RX ADMIN — METRONIDAZOLE 500 MG: 500 INJECTION, SOLUTION INTRAVENOUS at 22:10

## 2022-04-03 RX ADMIN — HEPARIN SODIUM 5000 UNITS: 5000 INJECTION INTRAVENOUS; SUBCUTANEOUS at 06:01

## 2022-04-03 RX ADMIN — HEPARIN SODIUM 5000 UNITS: 5000 INJECTION INTRAVENOUS; SUBCUTANEOUS at 21:32

## 2022-04-03 RX ADMIN — SODIUM CHLORIDE, POTASSIUM CHLORIDE, SODIUM LACTATE AND CALCIUM CHLORIDE: 600; 310; 30; 20 INJECTION, SOLUTION INTRAVENOUS at 04:11

## 2022-04-03 RX ADMIN — METRONIDAZOLE 500 MG: 500 INJECTION, SOLUTION INTRAVENOUS at 12:23

## 2022-04-03 ASSESSMENT — PAIN SCALES - GENERAL
PAINLEVEL_OUTOF10: 0
PAINLEVEL_OUTOF10: 3
PAINLEVEL_OUTOF10: 3
PAINLEVEL_OUTOF10: 0
PAINLEVEL_OUTOF10: 3

## 2022-04-03 ASSESSMENT — PAIN DESCRIPTION - PAIN TYPE: TYPE: ACUTE PAIN

## 2022-04-03 ASSESSMENT — PAIN DESCRIPTION - LOCATION
LOCATION: ABDOMEN
LOCATION: ABDOMEN

## 2022-04-03 NOTE — PROGRESS NOTES
Hospitalist Progress Note      PCP: Sanaz Cruz MD    Date of Admission: 4/2/2022    Chief Complaint: nausea, vomiting, diarrhea,    Hospital Course: Megan Horton is a 74 y.o. female wtith h/oVentral hernia,  GERD, RA, OA, NSAID use who presented with c/o nausea, vomiting and diarrhea on going for 4-5 days. She has had nasal congestion prior to that. The pt has not seen a physician for long time. She visited PCP Dr. Jaycee Olvera on 03/28 to establish care. LAb work that day showed Cr 1.0. She was given Nasal steroids. Her symptoms worsened and she started vomiting . Lab work today showed cr elevation 7.0 and cr 3.2. Lipase is also elevated to 689. CT abdomen and pelvis showed enterocolitis also showed endometrial lining thickening. UA showed UTI. The pt received 2 L bolus with LR. She has been started on Maintenance fluid  And IV antibiotics. Nephrology has been consulted     Subjective: Pt laying in bed, states she feels better. Still having diarrhea, but not as frequent. No further n/v. Reviewed labs with pt and son, agreeable to poc, denies cp, sob,palpitations, abd pain, headache. Reviewed POC, denies needs.      Assessment/Plan:    SHEELA  -likely 2/2 to GI losses and dehydration  -crt on admission 7.1, today 6.6  -nephology consulted  -did receive contrasted CT in ER  -continue IVF, rate decreased as hands and ankles are swelling  -Renal US for Monday  -avoid nephrotoxins and NSAIDs  -verdugo catheter  -bmp q6hr    UTI  -urine culture pending  -continue rocephin    Enterocolitis  -per ct abd/pelivs, infectious vs inflammatory  -stool studies pending   -add flagyl    Hyponatremia  -Na 129  -continue IVF    Hypokalemia  -cautiously replace given severe SHEELA  -tele     Metabolic acidosis  -nephrology following  -continue IVF  -2/2 to volume depletion     Elevated lipase  -lipase 689  -ct abd showed no abnormalities with pancreas  -likely 2/2 to vomiting and from SHEELA   -continue hydration  -repeat lipase after hydration    Endometrial wall thickening  -per CT, 1.3 cm  -pending pelvic US      Active Hospital Problems    Diagnosis     Renal failure [N19]        Medications:  Reviewed    Infusion Medications    lactated ringers 150 mL/hr at 04/03/22 0411    sodium chloride       Scheduled Medications    cefTRIAXone (ROCEPHIN) IV  1,000 mg IntraVENous Q24H    pantoprazole  40 mg Oral QAM AC    sodium chloride flush  5-40 mL IntraVENous 2 times per day    heparin (porcine)  5,000 Units SubCUTAneous 3 times per day    amLODIPine  5 mg Oral Nightly     PRN Meds: potassium chloride **OR** potassium alternative oral replacement **OR** potassium chloride, sodium chloride flush, sodium chloride, ondansetron **OR** ondansetron, polyethylene glycol, acetaminophen **OR** acetaminophen      Intake/Output Summary (Last 24 hours) at 4/3/2022 1012  Last data filed at 4/3/2022 0419  Gross per 24 hour   Intake 3032.15 ml   Output 675 ml   Net 2357.15 ml       Physical Exam Performed:    BP (!) 145/80   Pulse 89   Temp 98 °F (36.7 °C) (Oral)   Resp 16   Ht 5' 4\" (1.626 m)   Wt 227 lb 11.2 oz (103.3 kg)   SpO2 95%   BMI 39.08 kg/m²     General appearance: No apparent distress, appears stated age and cooperative. HEENT: Pupils equal, round, and reactive to light. Conjunctivae/corneas clear. Neck: Supple, with full range of motion. No jugular venous distention. Trachea midline. Respiratory:  Normal respiratory effort. Clear to auscultation, bilaterally without Rales/Wheezes/Rhonchi. Cardiovascular: Regular rate and rhythm with normal S1/S2 without murmurs, rubs or gallops. Abdomen: Soft, non-tender, non-distended with normal bowel sounds. Musculoskeletal: No clubbing, cyanosis. Trace edema bilaterally to hands and ankles. Full range of motion without deformity. Skin: Skin color, texture, turgor normal.  No rashes or lesions. Neurologic:  Neurovascularly intact without any focal sensory/motor deficits.  Cranial nerves: II-XII intact, grossly non-focal.  Psychiatric: Alert and oriented, thought content appropriate, normal insight  Capillary Refill: Brisk,< 3 seconds   Peripheral Pulses: +2 palpable, equal bilaterally       Labs:   Recent Labs     04/02/22  1021 04/03/22  0444   WBC 13.3* 6.8   HGB 17.3* 14.2   HCT 51.1* 41.8    258     Recent Labs     04/02/22  1020 04/02/22  2202 04/03/22  0444   * 130* 131*   K 3.2* 2.9* 2.9*   CL 86* 89* 91*   CO2 14* 16* 17*   * 108* 104*   CREATININE 7.1* 6.6* 6.6*   CALCIUM 8.4 7.5* 7.8*   PHOS  --  7.9* 7.5*     Recent Labs     04/02/22  1020   AST 12*   ALT 20   BILITOT 0.6   ALKPHOS 93     No results for input(s): INR in the last 72 hours. No results for input(s): Andrzej Dyer in the last 72 hours. Urinalysis:      Lab Results   Component Value Date    NITRU POSITIVE 04/02/2022    WBCUA 94 04/02/2022    BACTERIA 4+ 04/02/2022    RBCUA 3-4 04/02/2022    BLOODU MODERATE 04/02/2022    SPECGRAV 1.025 04/02/2022    GLUCOSEU Negative 04/02/2022       Radiology:  CT ABDOMEN PELVIS W IV CONTRAST Additional Contrast? None   Final Result   1. Mild infectious or inflammatory enterocolitis to include inflammatory   bowel disease. 2. Abnormal endometrial thickening to 1.3 cm. Recommend further evaluation   with nonemergent pelvic ultrasound. US PELVIS COMPLETE    (Results Pending)   US RENAL COMPLETE    (Results Pending)           DVT Prophylaxis: heparin  Diet: ADULT DIET; Clear Liquid; Low Potassium (Less than 3000 mg/day); Low Phosphorus (Less than 1000 mg)  Code Status: Full Code    PT/OT Eval Status: no acute needs    Dispo - home once medically stable    Cristhian Richmond APRN - CNP      NOTE:  This report was transcribed using voice recognition software. Every effort was made to ensure accuracy; however, inadvertent computerized transcription errors may be present.

## 2022-04-03 NOTE — PROGRESS NOTES
Message sent to NP: \"pt is here for UTI and SHEELA. pt Potassium this am is 3.2. pt does not have Potassium replacement orders. Please put orders in! Thank you! \"

## 2022-04-03 NOTE — H&P
HOSPITALISTS HISTORY AND PHYSICAL    4/2/2022 8:58 PM    Patient Information:  Edvin Cooney is a 74 y.o. female [id number]  PCP:  Jin Clark MD (Tel: [phone] )    Chief complaint:    Chief Complaint   Patient presents with    Emesis     pt states started with sinus congestion a weeks ago, havn't eaten in 1 weeks due to emesis and diarrhea, has been able to drink pedialyte, pt states has hernia, now with abd pain        History of Present Illness:  Katerin Duran is a 74 y.o. female wtith h/oVentral hernia,  GERD, RA, OA, NSAID use who presented with c/o nausea, vomiting and diarrhea on going for 4-5 days. She has had nasal congestion prior to that. The pt has not seen a physician for long time. She visited PCP Dr. Dulce Day on 03/28 to establish care. LAb work that day showed Cr 1.0. She was given Nasal steroids. Her symptoms worsened and she started vomiting . Lab work today showed cr elevation 7.0 and cr 3.2. Lipase is also elevated to 689. CT abdomen and pelvis showed enterocolitis also showed endometrial lining thickening. UA showed UTI. The pt received 2 L bolus with LR. She has been started on Maintenance fluid  And IV antibiotics. Nephrology has been consulted       History obtained and   Old medical records reviewed        REVIEW OF SYSTEMS:   Constitutional: Negative for fever,chills or night sweats  ENT: Negative for rhinorrhea, epistaxis, hoarseness, sore throat. Respiratory: Negative for shortness of breath,wheezing  Cardiovascular: Negative for chest pain, palpitations   Gastrointestinal: Negative for nausea, vomiting, diarrhea  Genitourinary: Negative for polyuria, dysuria   Hematologic/Lymphatic: Negative for bleeding tendency, easy bruising  Musculoskeletal: Negative for myalgias and arthralgias  Neurologic: Negative for confusion,dysarthria.   Skin: Negative for itching,rash  Psychiatric: Negative for depression,anxiety, agitation. Endocrine: Negative for polydipsia,polyuria,heat /cold intolerance. Past Medical History:   has a past medical history of PTSD (post-traumatic stress disorder) and Rheumatoid arthritis ([facility] [address] Past Surgical History:   has a past surgical history that includes Anterior cruciate ligament repair; Knee cartilage surgery; and Tubal ligation (Bilateral). Medications:  No current facility-administered medications on file prior to encounter.      Current Outpatient Medications on File Prior to Encounter   Medication Sig Dispense Refill    montelukast (SINGULAIR) 10 MG tablet Take 10 mg by mouth nightly      omeprazole 20 MG EC tablet Take 20 mg by mouth daily       Current Facility-Administered Medications   Medication Dose Route Frequency Provider Last Rate Last Admin    lactated ringers infusion   IntraVENous Continuous Faheem Wolf  mL/hr at 04/02/22 2050 New Bag at 04/02/22 2050    [START ON 4/3/2022] cefTRIAXone (ROCEPHIN) 1000 mg in sterile water 10 mL IV syringe  1,000 mg IntraVENous Q24H Faheem Wolf MD       [place] [facility] Cecile Bigness ON 4/3/2022] pantoprazole (PROTONIX) tablet 40 mg  40 mg Oral QAM AC Faheem Wolf MD        sodium chloride flush 0.9 % injection 5-40 mL  5-40 mL IntraVENous 2 times per day Faheem Wolf MD        sodium chloride flush 0.9 % injection 5-40 mL  5-40 mL IntraVENous PRN Faheem Wolf MD        0.9 % sodium chloride infusion   IntraVENous PRN Faheem Wolf MD        heparin (porcine) injection 5,000 Units  5,000 Units SubCUTAneous 3 times per day Faheem Wolf MD   5,000 Units at 04/02/22 1700    ondansetron (ZOFRAN-ODT) disintegrating tablet 4 mg  4 mg Oral Q8H PRN Faheem Wolf MD        Or    ondansetron [facility]) injection 4 mg  4 mg IntraVENous Q6H PRYESSENIA Wolf MD   4 mg at 04/02/22 2059    polyethylene glycol (GLYCOLAX) packet 17 g  17 g Oral Daily PRN Faheem Wolf MD        acetaminophen (TYLENOL) tablet 650 mg  650 mg Oral Q6H PRN Mello Aguirre MD   650 mg at 04/02/22 2051    Or    acetaminophen (TYLENOL) suppository 650 mg  650 mg Rectal Q6H PRN Mello Aguirre MD         Current Facility-Administered Medications   Medication Dose Route Frequency Provider Last Rate Last Admin    lactated ringers infusion   IntraVENous Continuous Mello Aguirre  mL/hr at 04/02/22 2050 New Bag at 04/02/22 2050    [START ON 4/3/2022] cefTRIAXone (ROCEPHIN) 1000 mg in sterile water 10 mL IV syringe  1,000 mg IntraVENous Q24H Mello Aguirre MD       Briana Lakin Nacourtney Santana ON 4/3/2022] pantoprazole (PROTONIX) tablet 40 mg  40 mg Oral QAM AC Mello Aguirre MD        sodium chloride flush 0.9 % injection 5-40 mL  5-40 mL IntraVENous 2 times per day Mello Aguirre MD        sodium chloride flush 0.9 % injection 5-40 mL  5-40 mL IntraVENous PRN Mello Aguirre MD        0.9 % sodium chloride infusion   IntraVENous PRN Mello Aguirre MD        heparin (porcine) injection 5,000 Units  5,000 Units SubCUTAneous 3 times per day Mello Aguirre MD   5,000 Units at 04/02/22 1700    ondansetron (ZOFRAN-ODT) disintegrating tablet 4 mg  4 mg Oral Q8H PRN Mello Aguirre MD        Or    ondansetron (ZOFRAN) injection 4 mg  4 mg IntraVENous Q6H PRN Mello Aguirre MD   4 mg at 04/02/22 2059    polyethylene glycol (GLYCOLAX) packet 17 g  17 g Oral Daily PRN Mello Aguirre MD        acetaminophen (TYLENOL) tablet 650 mg  650 mg Oral Q6H PRN Mello Aguirre MD   650 mg at 04/02/22 2051    Or    acetaminophen (TYLENOL) suppository 650 mg  650 mg Rectal Q6H PRN Mello Aguirre MD         Allergies: Allergies   Allergen Reactions    Penicillins Hives and Swelling    Rofecoxib Hives and Swelling    Sulfa Antibiotics Hives and Swelling        Social History:  Patient Lives    reports that she has never smoked. She has never used smokeless tobacco. She reports current alcohol use. She reports that she does not use drugs.      Family History:  family history includes Breast Cancer in her sister; Depression in her mother; Mental Illness in her mother; Other in her maternal grandfather and mother; Stroke in her mother. ,     Physical Exam:  BP (!) 154/78   Pulse 86   Temp 98 °F (36.7 °C) (Oral)   Resp 16   Ht 5' 4\" (1.626 m)   Wt 231 lb 3.2 oz (104.9 kg)   SpO2 97%   BMI 39.69 kg/m²     General appearance:  Appears comfortable. Well nourished  Eyes: Sclera clear, pupils equal  ENT: Moist mucus membranes, no thrush. Trachea midline. Cardiovascular: Regular rhythm, normal S1, S2. No murmur, gallop, rub. No edema in lower extremities  Respiratory: Clear to auscultation bilaterally, no wheeze, good inspiratory effort  Gastrointestinal: Abdomen soft, non-tender, not distended, normal bowel sounds  Musculoskeletal: No cyanosis in digits, neck supple  Neurology: Cranial nerves grossly intact. Alert and oriented in time, place and person. No speech or motor deficits  Psychiatry: Appropriate affect. Not agitated  Skin: Warm, dry, normal turgor, no rash  Brisk capillary refill, peripheral pulses palpable   Labs:  CBC:   Lab Results   Component Value Date    WBC 13.3 04/02/2022    RBC 6.32 04/02/2022    HGB 17.3 04/02/2022    HCT 51.1 04/02/2022    MCV 80.8 04/02/2022    MCH 27.4 04/02/2022    MCHC 33.9 04/02/2022    RDW 14.1 04/02/2022     04/02/2022    MPV 8.9 04/02/2022     BMP:    Lab Results   Component Value Date     04/02/2022    K 3.2 04/02/2022    CL 86 04/02/2022    CO2 14 04/02/2022     04/02/2022    CREATININE 7.1 04/02/2022    CALCIUM 8.4 04/02/2022    GFRAA 7 04/02/2022    LABGLOM 6 04/02/2022    GLUCOSE 185 04/02/2022     CT ABDOMEN PELVIS W IV CONTRAST Additional Contrast? None   Final Result   1. Mild infectious or inflammatory enterocolitis to include inflammatory   bowel disease. 2. Abnormal endometrial thickening to 1.3 cm. Recommend further evaluation   with nonemergent pelvic ultrasound.          US PELVIS COMPLETE    (Results Pending)     Chest Xray:   EKG:    I visualized CXR images and EKG strips    Discussed case  with     Problem List  Principal Problem:    Renal failure  Resolved Problems:    * No resolved hospital problems. *        Assessment/Plan:   Acute renal failure most likely prerenal d/t GI losses dehydration   Cr is 7.1  Potassium is 3.2  On LR infusion at 150 cc  Will get renal US  Avoid nephrotoxins, Avoid NSAIDs  Renal diet  Nephrology has been consulted  No plan for dialysis today    UTI  Cultures pending   Started on IV Ceftriaxone    Elevated Lipase 689  Could be d/t reduced clearance d/t renal failure  CT abdomen and pelvis is neg for pancreatic or liver pathology  Cont hydration   Clear liquid diet  Cont to monitor     Endometrial wall thickening 1.3 cm reported on CT   Will get Pelvic US      DVT prophylaxis SCDs   Code status full   Diet clears   IV access   Gutiérrez Catheter    Admit as inpatient. I anticipate hospitalization spanning more than two midnights for investigation and treatment of the above medically necessary diagnoses. Please note that some part of this chart was generated using Dragon dictation software. Although every effort was made to ensure the accuracy of this automated transcription, some errors in transcription may have occurred inadvertently. If you may need any clarification, please do not hesitate to contact me through [facility].        Hernandez Stiles MD    4/2/2022 8:58 PM

## 2022-04-03 NOTE — PROGRESS NOTES
MD Clary Stewart MD Cephus Poplar, MD                                  Office: [phone]                 Fax: [phone]          [employer]                     NEPHROLOGY IN PATIENT PROGRESS NOTE:     PATIENT NAME: Balaji Ann  : 1955  MRN: [id number]            Subjective:       More awake and alert. Reports less diarrhea. Less nausea. Less hypotension. Improving urine output. Gutiérrez catheter. IV bicarbonate infusion. Multiple IV potassium replacement. IV magnesium replacement    Assessment and Plan    Assessment:     1. Acute renal failure-severe-with decreased urine output. 2. Hypotension. 3. Hypovolemia. 4. Nausea vomiting diarrhea. 5. Multiple critical electrolyte imbalance. 6. Hyponatremia. 7. Hypokalemia. 8. Metabolic acidosis-severe. 9. Elevated lipase.         Plan:       Acute renal failure-severe-critically elevated on admission. - and a creatinine of 7.1.  -3 days ago creatinine is 1.1.  -Etiology likely multifactorial.  -Most likely related to prerenal and severe volume depletion. -ATN cannot be entirely excluded.  -Place Gutiérrez catheter as accurate urine output needed with severe renal failure. BUN is 93 and a creatinine of 5.9 and remains critically elevated-urine output improving     Severe hypotension and hypovolemia.  -Slow improvement-no evidence of fluid overload. -Continue current IV fluids at 150 mL/h     Hyponatremia-severe-likely related to volume depletion-sodium is 129.  -Hypokalemia-3.2-related to GI loss-needs replacement. --significant hypokalemia-requiring large doses of replacement. -Magnesium needs replacement.      -Severe metabolic acidosis-worsened by volume depletion.  -Continue IV fluid bolus.  -Continue sodium bicarbonate infusion     Patient noted to have elevated lipase. CT scan of the abdomen done with contrast appears negative.   -Repeat enteric enzymes after hydration.     No immediate indications for dialysis. Very high risk for worsening kidney function and will need to be closely monitored.     Treatment plan explained to patient and family at bedside    Patient is critically ill. Will need at least 4-5 inpatient hospital days given severity of renal failure          EXAM  Vitals:    04/03/22 1634   BP:    Pulse: 83   Resp:    Temp:    SpO2:        Intake/Output Summary (Last 24 hours) at 4/3/2022 1701  Last data filed at 4/3/2022 1407  Gross per 24 hour   Intake 3032.15 ml   Output 1425 ml   Net 1607.15 ml       ill appearing. No respiratory distress  Awake alert    Borderline hypotension. External exam of the ears and nose are normal  HENT: exam is normal  Eyes: Pupils are equal, round, and reactive to light. Lymph Nodes. No axillary or cervical lymph nodes are palpable. Neck. JVD not visible. No lymph nodes palpable. CVS.  Heart sounds are normal.Palpation of the heart is normal. No murmurs. No pericardial rub.  RS.dullness on percussion of the lower chest wall. Lung fields are clear   PA soft , bowel sounds are normal no distension and no tenderness to palpation. Skin No rash , No palpable nodules  Musculoskeletal: Normal range of motion. 1+ edema and no tenderness. CNS  No focal    Edematrace  Awake and alert  All pulses are well felt      Principal Problem:    Renal failure  Resolved Problems:    * No resolved hospital problems. *        Medications Reviewed by me   metroNIDAZOLE  500 mg IntraVENous Q8H    cefTRIAXone (ROCEPHIN) IV  1,000 mg IntraVENous Q24H    pantoprazole  40 mg Oral QAM AC    sodium chloride flush  5-40 mL IntraVENous 2 times per day    heparin (porcine)  5,000 Units SubCUTAneous 3 times per day    amLODIPine  5 mg Oral Nightly      sodium bicarbonate infusion 125 mL/hr at 04/03/22 1511    sodium chloride         Data Review.     Labs reviewed by me       CBC:   Recent Labs     04/02/22  1021 04/03/22  0444   WBC 13.3* 6.8   HGB 17.3* 14.2   HCT 51.1* 41.8   MCV 80.8 80.7    258     BMP:   Recent Labs     04/02/22 2202 04/03/22  0444 04/03/22  1506   * 131* 131*   K 2.9* 2.9* 3.0*   CL 89* 91* 93*   CO2 16* 17* 17*   PHOS 7.9* 7.5*  --    * 104* 93*   CREATININE 6.6* 6.6* 5.9*     Magnesium:   Lab Results   Component Value Date    MG 1.70 04/03/2022    MG 2.00 04/02/2022     Lab Results   Component Value Date    CREATININE 5.9 04/03/2022       Arterial Blood Gasses  No results for input(s): PH, PCO2, PO2 in the last 72 hours. Invalid input(s): [id number], INSPIREDO2    UA:  Recent Labs     04/02/22  1340   COLORU Yellow   PHUR 5.0   WBCUA 94*   RBCUA 3-4   BACTERIA 4+*   CLARITYU SLCLOUDY   SPECGRAV 1.025   LEUKOCYTESUR MODERATE*   UROBILINOGEN 0.2   BILIRUBINUR Negative   BLOODU MODERATE*   GLUCOSEU Negative       LIVER PROFILE:   Recent Labs     04/02/22  1020 04/02/22 2202   AST 12*  --    ALT 20  --    LIPASE 689.0* 594.0*   BILITOT 0.6  --    ALKPHOS 93  --      PT/INR:  No results found for: PROTIME, INR  PTT:  No results found for: APTT  PAULINO:  No results found for: ANATITER, PAULINO  CHEMISTRY COMMON GROUP :   Lab Results   Component Value Date    GLUCOSE 106 04/03/2022     Recent Labs     04/02/22  1020 04/02/22 2202 04/03/22  0444 04/03/22  1506   GLUCOSE 185* 128* 115* 106*   CALCIUM 8.4 7.5* 7.8* 7.7*         RADIOLOGY:        Imaging Results. Chest X Ray reviwed by me    Chest Xray Reviewed by me  Renal Ultrasound Reviewed by me    EKG reviewed by me.                 Electronically Signed: José Luis Segura MD 4/3/2022 5:01 PM

## 2022-04-03 NOTE — CONSULTS
water 10 mL IV syringe, 1,000 mg, IntraVENous, Q24H  [START ON 4/3/2022] pantoprazole (PROTONIX) tablet 40 mg, 40 mg, Oral, QAM AC  sodium chloride flush 0.9 % injection 5-40 mL, 5-40 mL, IntraVENous, 2 times per day  sodium chloride flush 0.9 % injection 5-40 mL, 5-40 mL, IntraVENous, PRN  0.9 % sodium chloride infusion, , IntraVENous, PRN  heparin (porcine) injection 5,000 Units, 5,000 Units, SubCUTAneous, 3 times per day  ondansetron (ZOFRAN-ODT) disintegrating tablet 4 mg, 4 mg, Oral, Q8H PRN **OR** ondansetron (ZOFRAN) injection 4 mg, 4 mg, IntraVENous, Q6H PRN  polyethylene glycol (GLYCOLAX) packet 17 g, 17 g, Oral, Daily PRN  acetaminophen (TYLENOL) tablet 650 mg, 650 mg, Oral, Q6H PRN **OR** acetaminophen (TYLENOL) suppository 650 mg, 650 mg, Rectal, Q6H PRN  amLODIPine (NORVASC) tablet 5 mg, 5 mg, Oral, Nightly   lactated ringers 150 mL/hr at 04/02/22 2050    sodium chloride           Allergies. Allergies   Allergen Reactions    Penicillins Hives and Swelling    Rofecoxib Hives and Swelling    Sulfa Antibiotics Hives and Swelling       Social History.   Social History     Socioeconomic History    Marital status:      Spouse name: Ventura Vinson    Number of children: 2    Years of education: Not on file    Highest education level: Not on file   Occupational History    Not on file   Tobacco Use    Smoking status: Never Smoker    Smokeless tobacco: Never Used   Vaping Use    Vaping Use: Never used   Substance and Sexual Activity    Alcohol use: Yes     Comment: rarely    Drug use: Never    Sexual activity: Not Currently   Other Topics Concern    Not on file   Social History Narrative    Not on file     Social Determinants of Health     Financial Resource Strain: Low Risk     Difficulty of Paying Living Expenses: Not hard at all   Food Insecurity: No Food Insecurity    Worried About [address] in the Last Year: Never true    [address] in the Last Year: Never true Transportation Needs:     Lack of Transportation (Medical): Not on file    Lack of Transportation (Non-Medical): Not on file   Physical Activity:     Days of Exercise per Week: Not on file    Minutes of Exercise per Session: Not on file   Stress:     Feeling of Stress : Not on file   Social Connections:     Frequency of Communication with Friends and Family: Not on file    Frequency of Social Gatherings with Friends and Family: Not on file    Attends Muslim Services: Not on file    Active Member of Clubs or Organizations: Not on file    Attends Club or Organization Meetings: Not on file    Marital Status: Not on file   Intimate Partner Violence:     Fear of Current or Ex-Partner: Not on file    Emotionally Abused: Not on file    Physically Abused: Not on file    Sexually Abused: Not on file   Housing Stability:     Unable to Pay for Housing in the Last Year: Not on file    Number of [place] in the Last Year: Not on file    Unstable Housing in the Last Year: Not on file       Family History    Family History   Problem Relation Age of Onset    Depression Mother     Other Mother     Mental Illness Mother     Stroke Mother     Breast Cancer Sister     Other Maternal Grandfather        Review of Systems. I have reviewed in detail the 10 system review with admitting physician and other consultants on the case     PHYSICAL EXAMINATION. BP (!) 154/78   Pulse 86   Temp 98 °F (36.7 °C) (Oral)   Resp 16   Ht 5' 4\" (1.626 m)   Wt 231 lb 3.2 oz (104.9 kg)   SpO2 97%   BMI 39.69 kg/m²     Intake/Output Summary (Last 24 hours) at 4/2/2022 2209  Last data filed at 4/2/2022 1915  Gross per 24 hour   Intake 100 ml   Output --   Net 100 ml       INTAKE/OUTPUT:  No intake/output data recorded. I/O this shift:  In: 100 [P.O.:100]  Out: -        Ill Appearing. No respiratory distress  Awake alert    Borderline hypotension.   External exam of the ears and nose are normal  HENT: exam is normal  Eyes: Pupils are equal, round, and reactive to light. Lymph Nodes. No axillary or cervical lymph nodes are palpable. Neck. JVD not visible. No lymph nodes palpable. CVS.  Heart sounds are normal.Palpation of the heart is normal. No murmurs. No pericardial rub.  RS.dullness on percussion of the lower chest wall. Lung fields are clear   PA soft , bowel sounds are normal no distension and no tenderness to palpation. Skin No rash , No palpable nodules  Musculoskeletal: Normal range of motion. 1+ edema and no tenderness. CNS  No focal    Edematrace  Awake and alert   All pulses are well felt      Labs reviewed by me       CBC:   Recent Labs     04/02/22  1021   WBC 13.3*   HGB 17.3*   HCT 51.1*   MCV 80.8        BMP:   Recent Labs     04/02/22  1020   *   K 3.2*   CL 86*   CO2 14*   *   CREATININE 7.1*     Magnesium:   Lab Results   Component Value Date    MG 2.00 04/02/2022                    ASSESSMENT         1. Acute renal failure-severe-with decreased urine output. 2. Hypotension. 3. Hypovolemia. 4. Nausea vomiting diarrhea. 5. Multiple critical electrolyte imbalance. 6. Hyponatremia. 7. Hypokalemia. 8. Metabolic acidosis-severe. 9. Elevated lipase. PLAN         Acute renal failure-severe-critically elevated on admission. - and a creatinine of 7.1.  -3 days ago creatinine is 1.1.  -Etiology likely multifactorial.  -Most likely related to prerenal and severe volume depletion. -ATN cannot be entirely excluded.  -Place Gutiérrez catheter as accurate urine output needed with severe renal failure. Severe hypotension and hypovolemia. -Give 3 L fluid bolus as planned.  -Continue IV fluids at 150 mL/h.     Hyponatremia-severe-likely related to volume depletion-sodium is 129.  -Hypokalemia-3.2-related to GI loss-needs replacement.  -Severe metabolic acidosis-worsened by volume depletion.  -Continue IV fluid bolus.  -No immediate indications for sodium bicarbonate. Patient noted to have elevated lipase. CT scan of the abdomen done with contrast appears negative. -Repeat enteric enzymes after hydration. No immediate indications for dialysis. Very high risk for worsening kidney function and will need to be closely monitored. Treatment plan explained to patient and family at bedside    Work up for acute renal failure has been ordered which include:  Renal Panel study  CBC  Urine Analysis   Urine Electrolytes   U Protein : creatinine ratio  Urine for eosinophil smear exam.    Renal Ultrasound Scan     Daily weight   Strict I and O   Renal Diet   Low Na diet     Electrolytes reviewed. Potassium is low, replace as per protocol. Magnesium levels  are stable. medications reviewed. discontinue diuretics. continue IV fluids at current rate. No immediate indications for dialysis. Fluid Overload. Edema is stable  Monitor urine output and report if less than 50ml/hr. Renal functions and electrolytes need close monitoring due toWorsening Kidney function. Repeat chest xray in the morning. Treatment Plan discussed withpatient, family, treating team and RN. Treatment plan discussed with patient., High risk. and critically ill -time spent 35 mins. Thanks for the consult             Electronically Signed:  Mariano Sommers MD 4/2/2022          Arterial Blood Gasses  No results for input(s): PH, PCO2, PO2 in the last 72 hours.     Invalid input(s): [id number], INSPIREDO2    UA:  Recent Labs     04/02/22  1340   COLORU Yellow   PHUR 5.0   WBCUA 94*   RBCUA 3-4   BACTERIA 4+*   CLARITYU SLCLOUDY   SPECGRAV 1.025   LEUKOCYTESUR MODERATE*   UROBILINOGEN 0.2   BILIRUBINUR Negative   BLOODU MODERATE*   GLUCOSEU Negative       LIVER PROFILE:   Recent Labs     04/02/22  1020   AST 12*   ALT 20   LIPASE 689.0*   BILITOT 0.6   ALKPHOS 93     PT/INR:  No results found for: PROTIME, INR  PTT:  No results found for: APTT  PAULINO:  No results found for: ANATITER, PAULINO        RADIOLOGY:    CT ABDOMEN PELVIS W IV CONTRAST Additional Contrast? None    Result Date: 4/2/2022  EXAMINATION: CT OF THE ABDOMEN AND PELVIS WITH CONTRAST 4/2/2022 10:57 am TECHNIQUE: CT of the abdomen and pelvis was performed with the administration of intravenous contrast. Multiplanar reformatted images are provided for review. Dose modulation, iterative reconstruction, and/or weight based adjustment of the mA/kV was utilized to reduce the radiation dose to as low as reasonably achievable. COMPARISON: None. HISTORY: ORDERING SYSTEM PROVIDED HISTORY: Abdominal cramping TECHNOLOGIST PROVIDED HISTORY: Additional Contrast?->None Reason for exam:->Abdominal cramping Decision Support Exception - unselect if not a suspected or confirmed emergency medical condition->Emergency Medical Condition (MA) Reason for Exam: emesis and abdominal cramping FINDINGS: Lower Chest: Unremarkable Organs: Liver is normal in contour and enhancement. Gallbladder is unremarkable without biliary ductal dilatation. Pancreas is unremarkable. Adrenals are unremarkable. Spleen is normal in size. No renal calculi or hydronephrosis. Vasculature is unremarkable. GI/Bowel: Borderline wall thickening of the distal ileum entirety of the colon with mucosal hyperemia and liquid stool. Appendix is normal. Pelvis: Abnormal thickening of the endometrium to 1.3 cm. Peritoneum/Retroperitoneum:No free fluid, free air, organized fluid collection or lymphadenopathy. Bones: Multilevel degenerative disc disease. Large bowel containing ventral hernia without evidence of obstruction or strangulation     1. Mild infectious or inflammatory enterocolitis to include inflammatory bowel disease. 2. Abnormal endometrial thickening to 1.3 cm. Recommend further evaluation with nonemergent pelvic ultrasound. Imaging Results.   Chest X Ray reviwed by me          Electronically Signed:  Cristela Plasencia MD 4/2/2022

## 2022-04-03 NOTE — PROGRESS NOTES
Message sent to hospitalist: \"pt is here for UTI, SHEELA. I messaged the NP about the K 3.2 but per NP \"would not replaced with her CRT is 7.2\". pt K this am is 2.9 and CRT is 6.6. Should we replace it?  Need Callback: NO CALLBACK \"  Per hospitalist: \"U can give her 20 meq\"

## 2022-04-03 NOTE — PLAN OF CARE
Problem: Falls - Risk of:  Goal: Will remain free from falls  Description: Will remain free from falls  4/3/2022 0914 by Carmen Dugan RN  Outcome: Ongoing     Problem: Falls - Risk of:  Goal: Absence of physical injury  Description: Absence of physical injury  4/3/2022 0914 by Carmen Dugan RN  Outcome: Ongoing     Problem: Falls - Risk of:  Goal: Absence of physical injury  Description: Absence of physical injury  4/3/2022 0914 by Carmen Dugan RN  Outcome: Ongoing     Problem: Pain:  Goal: Pain level will decrease  Description: Pain level will decrease  4/3/2022 0914 by Carmen Dugan RN  Outcome: Ongoing     Problem: Pain:  Goal: Control of acute pain  Description: Control of acute pain  4/3/2022 0914 by Carmen Dugan RN  Outcome: Ongoing     Problem: Pain:  Goal: Control of chronic pain  Description: Control of chronic pain  4/3/2022 0914 by Carmen Dugan RN  Outcome: Ongoing     Problem: Skin Integrity:  Goal: Will show no infection signs and symptoms  Description: Will show no infection signs and symptoms  4/3/2022 0914 by Carmen Dugan RN  Outcome: Ongoing     Problem: Skin Integrity:  Goal: Absence of new skin breakdown  Description: Absence of new skin breakdown  4/3/2022 0914 by Carmen Dugan RN  Outcome: Ongoing

## 2022-04-03 NOTE — PROGRESS NOTES
Assessment completed, see doc flowsheets. Pt is A&Ox4 Lung sounds are clear. VSS. Tele on. Medication given per [facility]. Patient has no needs at this time. Call light within in reach, will continue to monitor.

## 2022-04-04 LAB
ALBUMIN SERPL-MCNC: 3.2 G/DL (ref 3.4–5)
ANION GAP SERPL CALCULATED.3IONS-SCNC: 14 MMOL/L (ref 3–16)
ANION GAP SERPL CALCULATED.3IONS-SCNC: 16 MMOL/L (ref 3–16)
ANION GAP SERPL CALCULATED.3IONS-SCNC: 17 MMOL/L (ref 3–16)
BUN BLDV-MCNC: 59 MG/DL (ref 7–20)
BUN BLDV-MCNC: 70 MG/DL (ref 7–20)
BUN BLDV-MCNC: 78 MG/DL (ref 7–20)
CALCIUM SERPL-MCNC: 7.8 MG/DL (ref 8.3–10.6)
CALCIUM SERPL-MCNC: 7.8 MG/DL (ref 8.3–10.6)
CALCIUM SERPL-MCNC: 7.9 MG/DL (ref 8.3–10.6)
CHLORIDE BLD-SCNC: 93 MMOL/L (ref 99–110)
CHLORIDE BLD-SCNC: 93 MMOL/L (ref 99–110)
CHLORIDE BLD-SCNC: 94 MMOL/L (ref 99–110)
CO2: 24 MMOL/L (ref 21–32)
CO2: 25 MMOL/L (ref 21–32)
CO2: 25 MMOL/L (ref 21–32)
CREAT SERPL-MCNC: 3.7 MG/DL (ref 0.6–1.2)
CREAT SERPL-MCNC: 4.2 MG/DL (ref 0.6–1.2)
CREAT SERPL-MCNC: 4.8 MG/DL (ref 0.6–1.2)
GFR AFRICAN AMERICAN: 11
GFR AFRICAN AMERICAN: 13
GFR AFRICAN AMERICAN: 15
GFR NON-AFRICAN AMERICAN: 11
GFR NON-AFRICAN AMERICAN: 12
GFR NON-AFRICAN AMERICAN: 9
GI BACTERIAL PATHOGENS BY PCR: ABNORMAL
GLUCOSE BLD-MCNC: 122 MG/DL (ref 70–99)
GLUCOSE BLD-MCNC: 138 MG/DL (ref 70–99)
GLUCOSE BLD-MCNC: 164 MG/DL (ref 70–99)
LIPASE: 426 U/L (ref 13–60)
MAGNESIUM: 1.6 MG/DL (ref 1.8–2.4)
MAGNESIUM: 2.1 MG/DL (ref 1.8–2.4)
MAGNESIUM: 2.2 MG/DL (ref 1.8–2.4)
ORGANISM: ABNORMAL
ORGANISM: ABNORMAL
PHOSPHORUS: 4.4 MG/DL (ref 2.5–4.9)
POTASSIUM REFLEX MAGNESIUM: 2.9 MMOL/L (ref 3.5–5.1)
POTASSIUM REFLEX MAGNESIUM: 3.2 MMOL/L (ref 3.5–5.1)
POTASSIUM SERPL-SCNC: 2.7 MMOL/L (ref 3.5–5.1)
SARS-COV-2: NOT DETECTED
SODIUM BLD-SCNC: 131 MMOL/L (ref 136–145)
SODIUM BLD-SCNC: 135 MMOL/L (ref 136–145)
SODIUM BLD-SCNC: 135 MMOL/L (ref 136–145)
URINE CULTURE, ROUTINE: ABNORMAL

## 2022-04-04 PROCEDURE — 6360000002 HC RX W HCPCS: Performed by: NURSE PRACTITIONER

## 2022-04-04 PROCEDURE — 6360000002 HC RX W HCPCS: Performed by: INTERNAL MEDICINE

## 2022-04-04 PROCEDURE — 2500000003 HC RX 250 WO HCPCS: Performed by: NURSE PRACTITIONER

## 2022-04-04 PROCEDURE — 80069 RENAL FUNCTION PANEL: CPT

## 2022-04-04 PROCEDURE — 6370000000 HC RX 637 (ALT 250 FOR IP): Performed by: FAMILY MEDICINE

## 2022-04-04 PROCEDURE — 36415 COLL VENOUS BLD VENIPUNCTURE: CPT

## 2022-04-04 PROCEDURE — 6360000002 HC RX W HCPCS: Performed by: FAMILY MEDICINE

## 2022-04-04 PROCEDURE — 83690 ASSAY OF LIPASE: CPT

## 2022-04-04 PROCEDURE — 6370000000 HC RX 637 (ALT 250 FOR IP): Performed by: NURSE PRACTITIONER

## 2022-04-04 PROCEDURE — 2500000003 HC RX 250 WO HCPCS: Performed by: INTERNAL MEDICINE

## 2022-04-04 PROCEDURE — 2060000000 HC ICU INTERMEDIATE R&B

## 2022-04-04 PROCEDURE — 2580000003 HC RX 258: Performed by: INTERNAL MEDICINE

## 2022-04-04 PROCEDURE — 2580000003 HC RX 258: Performed by: FAMILY MEDICINE

## 2022-04-04 PROCEDURE — 83735 ASSAY OF MAGNESIUM: CPT

## 2022-04-04 RX ORDER — CIPROFLOXACIN 2 MG/ML
400 INJECTION, SOLUTION INTRAVENOUS EVERY 24 HOURS
Status: DISCONTINUED | OUTPATIENT
Start: 2022-04-05 | End: 2022-04-07 | Stop reason: HOSPADM

## 2022-04-04 RX ORDER — CIPROFLOXACIN 2 MG/ML
200 INJECTION, SOLUTION INTRAVENOUS ONCE
Status: COMPLETED | OUTPATIENT
Start: 2022-04-04 | End: 2022-04-04

## 2022-04-04 RX ORDER — LACTOBACILLUS RHAMNOSUS GG 10B CELL
1 CAPSULE ORAL
Status: DISCONTINUED | OUTPATIENT
Start: 2022-04-04 | End: 2022-04-07 | Stop reason: HOSPADM

## 2022-04-04 RX ORDER — POTASSIUM CHLORIDE 20 MEQ/1
20 TABLET, EXTENDED RELEASE ORAL 2 TIMES DAILY WITH MEALS
Status: COMPLETED | OUTPATIENT
Start: 2022-04-04 | End: 2022-04-04

## 2022-04-04 RX ORDER — MAGNESIUM SULFATE IN WATER 40 MG/ML
2000 INJECTION, SOLUTION INTRAVENOUS ONCE
Status: COMPLETED | OUTPATIENT
Start: 2022-04-04 | End: 2022-04-04

## 2022-04-04 RX ORDER — CIPROFLOXACIN 2 MG/ML
200 INJECTION, SOLUTION INTRAVENOUS EVERY 12 HOURS
Status: DISCONTINUED | OUTPATIENT
Start: 2022-04-04 | End: 2022-04-04

## 2022-04-04 RX ORDER — HEPARIN SODIUM 5000 [USP'U]/ML
5000 INJECTION, SOLUTION INTRAVENOUS; SUBCUTANEOUS 2 TIMES DAILY
Status: DISCONTINUED | OUTPATIENT
Start: 2022-04-04 | End: 2022-04-07 | Stop reason: HOSPADM

## 2022-04-04 RX ORDER — MAGNESIUM SULFATE IN WATER 40 MG/ML
2000 INJECTION, SOLUTION INTRAVENOUS ONCE
Status: COMPLETED | OUTPATIENT
Start: 2022-04-04 | End: 2022-04-07

## 2022-04-04 RX ORDER — SODIUM CHLORIDE, SODIUM LACTATE, POTASSIUM CHLORIDE, CALCIUM CHLORIDE 600; 310; 30; 20 MG/100ML; MG/100ML; MG/100ML; MG/100ML
INJECTION, SOLUTION INTRAVENOUS CONTINUOUS
Status: DISCONTINUED | OUTPATIENT
Start: 2022-04-04 | End: 2022-04-07

## 2022-04-04 RX ORDER — POTASSIUM CHLORIDE 7.45 MG/ML
10 INJECTION INTRAVENOUS
Status: COMPLETED | OUTPATIENT
Start: 2022-04-04 | End: 2022-04-04

## 2022-04-04 RX ADMIN — Medication 10 ML: at 09:31

## 2022-04-04 RX ADMIN — METRONIDAZOLE 500 MG: 500 INJECTION, SOLUTION INTRAVENOUS at 06:03

## 2022-04-04 RX ADMIN — SODIUM BICARBONATE: 84 INJECTION, SOLUTION INTRAVENOUS at 06:01

## 2022-04-04 RX ADMIN — POTASSIUM CHLORIDE 20 MEQ: 20 TABLET, EXTENDED RELEASE ORAL at 16:48

## 2022-04-04 RX ADMIN — PANTOPRAZOLE SODIUM 40 MG: 40 TABLET, DELAYED RELEASE ORAL at 05:44

## 2022-04-04 RX ADMIN — POTASSIUM CHLORIDE 10 MEQ: 7.46 INJECTION, SOLUTION INTRAVENOUS at 16:02

## 2022-04-04 RX ADMIN — SODIUM CHLORIDE, POTASSIUM CHLORIDE, SODIUM LACTATE AND CALCIUM CHLORIDE: 600; 310; 30; 20 INJECTION, SOLUTION INTRAVENOUS at 14:27

## 2022-04-04 RX ADMIN — HEPARIN SODIUM 5000 UNITS: 5000 INJECTION INTRAVENOUS; SUBCUTANEOUS at 05:44

## 2022-04-04 RX ADMIN — AMLODIPINE BESYLATE 5 MG: 5 TABLET ORAL at 20:59

## 2022-04-04 RX ADMIN — Medication 1 CAPSULE: at 11:54

## 2022-04-04 RX ADMIN — POTASSIUM CHLORIDE 10 MEQ: 7.46 INJECTION, SOLUTION INTRAVENOUS at 14:00

## 2022-04-04 RX ADMIN — CIPROFLOXACIN 200 MG: 2 INJECTION, SOLUTION INTRAVENOUS at 12:00

## 2022-04-04 RX ADMIN — Medication 1 CAPSULE: at 16:48

## 2022-04-04 RX ADMIN — MAGNESIUM SULFATE HEPTAHYDRATE 2000 MG: 40 INJECTION, SOLUTION INTRAVENOUS at 09:28

## 2022-04-04 RX ADMIN — Medication 10 ML: at 05:45

## 2022-04-04 RX ADMIN — Medication 1000 MG: at 09:31

## 2022-04-04 RX ADMIN — POTASSIUM CHLORIDE 20 MEQ: 20 TABLET, EXTENDED RELEASE ORAL at 09:29

## 2022-04-04 RX ADMIN — HEPARIN SODIUM 5000 UNITS: 5000 INJECTION INTRAVENOUS; SUBCUTANEOUS at 20:59

## 2022-04-04 RX ADMIN — ONDANSETRON 4 MG: 2 INJECTION INTRAMUSCULAR; INTRAVENOUS at 09:40

## 2022-04-04 RX ADMIN — POTASSIUM CHLORIDE 10 MEQ: 7.46 INJECTION, SOLUTION INTRAVENOUS at 15:01

## 2022-04-04 RX ADMIN — Medication 1 CAPSULE: at 09:29

## 2022-04-04 RX ADMIN — HEPARIN SODIUM 5000 UNITS: 5000 INJECTION INTRAVENOUS; SUBCUTANEOUS at 11:55

## 2022-04-04 RX ADMIN — CIPROFLOXACIN 200 MG: 2 INJECTION, SOLUTION INTRAVENOUS at 14:28

## 2022-04-04 RX ADMIN — POTASSIUM CHLORIDE 10 MEQ: 7.46 INJECTION, SOLUTION INTRAVENOUS at 13:00

## 2022-04-04 RX ADMIN — SODIUM CHLORIDE 25 ML: 9 INJECTION, SOLUTION INTRAVENOUS at 09:26

## 2022-04-04 ASSESSMENT — PAIN SCALES - GENERAL
PAINLEVEL_OUTOF10: 0

## 2022-04-04 NOTE — PROGRESS NOTES
Attempted to collect labs through midline, no blood return, pt.  Now a lab draw, night shift nurse notified

## 2022-04-04 NOTE — PROGRESS NOTES
MD Alethea Ruth MD Rice Kuster, MD                                  Office: [phone]                 Fax: [phone]          [email or web address]                     NEPHROLOGY IN PATIENT PROGRESS NOTE:     PATIENT NAME: Dylan Quigley  : 1955  MRN: [id number]            Subjective:       More awake and alert. Reports less diarrhea. Less nausea. Less hypotension. Improving urine output. Gutiérrez catheter. IV bicarbonate infusion. Multiple IV potassium replacement. IV magnesium replacement    Assessment and Plan    Assessment:     1. Acute renal failure-severe-with decreased urine output.-Slow improvement  2. Hypotension.-Improving  3. Hypovolemia.-Improving  4. Nausea vomiting diarrhea. 5. Multiple critical electrolyte imbalance. -Remains refractory  6. Hyponatremia. 7. Hypokalemia. -Remains refractory. 8. Hypomagnesemia needs replacement  9. Metabolic acidosis--improving  10. Elevated lipase.         Plan:       Acute renal failure-severe-critically elevated on admission. - and a creatinine of 7.1.  -3 days ago creatinine is 1.1.  -Etiology likely multifactorial.  -Most likely related to prerenal and severe volume depletion. -ATN cannot be entirely excluded.  -Place Gutiérrez catheter as accurate urine output needed with severe renal failure. Slow improvement from severe renal failure with improved urine output BUN is 78 and a creatinine of 4.8     Severe hypotension and hypovolemia.  -Slow improvement-no evidence of fluid overload. -Continue current IV fluids at 150 mL/h     Hyponatremia-severe-likely related to volume depletion-sodium is 129.  -Hypokalemia-3.2-related to GI loss-needs replacement. Refractory critical electrolyte imbalance. -Hypokalemia-2.7-give IV KCl total of 40 mEq. Hypomagnesemia-replace 2 g magnesium sulfate IV. Discontinue the IV bicarbonate infusion.     Volume status-reduce IV fluids-changed to Ringer lactate        -Continue IV fluid bolus. Discontinue the IV bicarbonate as metabolic acidosis has improved     Patient noted to have elevated lipase. CT scan of the abdomen done with contrast appears negative. -Repeat enteric enzymes after hydration.     No immediate indications for dialysis. Very high risk for worsening kidney function and will need to be closely monitored.     Treatment plan explained to patient and family at bedside. Continue Gutiérrez catheter    Patient is critically ill. Will need at least 4-5 inpatient hospital days given severity of renal failure          EXAM  Vitals:    04/04/22 1120   BP: (!) 140/81   Pulse: 87   Resp: 18   Temp: 97.7 °F (36.5 °C)   SpO2:        Intake/Output Summary (Last 24 hours) at 4/4/2022 1232  Last data filed at 4/4/2022 1124  Gross per 24 hour   Intake --   Output 3450 ml   Net -3450 ml       ill appearing. No respiratory distress  Awake alert    Borderline hypotension. External exam of the ears and nose are normal  HENT: exam is normal  Eyes: Pupils are equal, round, and reactive to light. Lymph Nodes. No axillary or cervical lymph nodes are palpable. Neck. JVD not visible. No lymph nodes palpable. CVS.  Heart sounds are normal.Palpation of the heart is normal. No murmurs. No pericardial rub.  RS.dullness on percussion of the lower chest wall. Lung fields are clear   PA soft , bowel sounds are normal no distension and no tenderness to palpation. Skin No rash , No palpable nodules  Musculoskeletal: Normal range of motion. 1+ edema and no tenderness. CNS  No focal    Edematrace  Awake and alert  All pulses are well felt      Principal Problem:    Renal failure  Resolved Problems:    * No resolved hospital problems.  *        Medications Reviewed by me   ciprofloxacin  200 mg IntraVENous Q12H    potassium chloride  20 mEq Oral BID     cefTRIAXone (ROCEPHIN) IV  1,000 mg IntraVENous Q24H    lactobacillus  1 capsule Oral TID     potassium chloride  10 mEq IntraVENous Q1H    magnesium sulfate  2,000 mg IntraVENous Once    lidocaine 1 % injection  5 mL IntraDERmal Once    sodium chloride flush  5-40 mL IntraVENous 2 times per day    pantoprazole  40 mg Oral QAM AC    sodium chloride flush  5-40 mL IntraVENous 2 times per day    heparin (porcine)  5,000 Units SubCUTAneous 3 times per day    amLODIPine  5 mg Oral Nightly      lactated ringers      sodium chloride 25 mL (04/04/22 0911)    sodium chloride         Data Review. Labs reviewed by me       CBC:   Recent Labs     04/02/22  1021 04/03/22  0444   WBC 13.3* 6.8   HGB 17.3* 14.2   HCT 51.1* 41.8   MCV 80.8 80.7    258     BMP:   Recent Labs     04/02/22 2202 04/02/22 2202 04/03/22  0444 04/03/22  1506 04/04/22  0550   *   < > 131* 131* 135*   K 2.9*   < > 2.9* 3.0* 2.7*   CL 89*   < > 91* 93* 94*   CO2 16*   < > 17* 17* 25   PHOS 7.9*  --  7.5*  --  4.4   *   < > 104* 93* 78*   CREATININE 6.6*   < > 6.6* 5.9* 4.8*    < > = values in this interval not displayed. Magnesium:   Lab Results   Component Value Date    MG 1.60 04/04/2022    MG 1.70 04/03/2022    MG 2.00 04/02/2022     Lab Results   Component Value Date    CREATININE 4.8 04/04/2022       Arterial Blood Gasses  No results for input(s): PH, PCO2, PO2 in the last 72 hours.     Invalid input(s): [id number], INSPIREDO2    UA:  Recent Labs     04/02/22  1340   COLORU Yellow   PHUR 5.0   WBCUA 94*   RBCUA 3-4   BACTERIA 4+*   CLARITYU SLCLOUDY   SPECGRAV 1.025   LEUKOCYTESUR MODERATE*   UROBILINOGEN 0.2   BILIRUBINUR Negative   BLOODU MODERATE*   GLUCOSEU Negative       LIVER PROFILE:   Recent Labs     04/02/22  1020 04/02/22 2202   AST 12*  --    ALT 20  --    LIPASE 689.0* 594.0*   BILITOT 0.6  --    ALKPHOS 93  --      PT/INR:  No results found for: PROTIME, INR  PTT:  No results found for: APTT  PAULINO:  No results found for: ANATITER, PAULINO  CHEMISTRY COMMON GROUP :   Lab Results   Component Value Date    GLUCOSE 138 04/04/2022 Recent Labs     04/02/22  1020 04/02/22  2202 04/03/22  0444 04/03/22  1506 04/04/22  0550   GLUCOSE 185* 128* 115* 106* 138*   CALCIUM 8.4 7.5* 7.8* 7.7* 7.8*         RADIOLOGY:        Imaging Results. Chest X Ray reviwed by me    Chest Xray Reviewed by me  Renal Ultrasound Reviewed by me    EKG reviewed by me.                 Electronically Signed: Carlyle Hardin MD 4/4/2022 12:32 PM

## 2022-04-04 NOTE — PROGRESS NOTES
[facility] HOSPITALISTS PROGRESS NOTE    4/5/2022 11:09 AM        Name: Buddie Lombard . Admitted: 4/2/2022  Primary Care Provider: Ludin Anderson MD (Tel: [phone])      Chief Complaint   Patient presents with    Emesis     pt states started with sinus congestion a weeks ago, havn't eaten in 1 weeks due to emesis and diarrhea, has been able to drink pedialyte, pt states has hernia, now with abd pain     Brief History: Patient is a 73 yo female with hx ventral hernia, GERD, arthritis. She presented to ER with n/v/d ongoing for past 4-5 days. Labs remarkable for creatinine 7.1 compared to 1.0 just few days earlier. CT scan showed enterocolitis. UA suggestive UTI. She received IV fluids and was started on IV antibiotics. Subjective:  Presently resting in bed. Reports she is feeling better compared to admission but she did have emesis this am after taking po potassium. Denies nausea at present, denies abdominal pain. Continues to note diarrhea but it is improving, estimates 4-5 stools a day, had 2 liquid BMs this am so far. Discussed salmonella and that it is food borne illness, she says she just spent 30 minutes talking to [employer]. No other family members ill.      Reviewed interval ancillary notes    Current Medications  lactobacillus (CULTURELLE) capsule 1 capsule, TID WC  lactated ringers infusion, Continuous  magnesium sulfate 2000 mg in 50 mL IVPB premix, Once  ciprofloxacin (CIPRO) IVPB 400 mg, Q24H  heparin (porcine) injection 5,000 Units, BID  lidocaine PF 1 % injection 5 mL, Once  sodium chloride flush 0.9 % injection 5-40 mL, 2 times per day  sodium chloride flush 0.9 % injection 5-40 mL, PRN  0.9 % sodium chloride infusion, PRN  pantoprazole (PROTONIX) tablet 40 mg, QAM AC  sodium chloride flush 0.9 % injection 5-40 mL, 2 times per day  sodium chloride flush 0.9 % injection 5-40 mL, PRN  0.9 % sodium chloride infusion, PRN  ondansetron (ZOFRAN-ODT) disintegrating tablet 4 mg, Q8H PRN   Or  ondansetron (ZOFRAN) injection 4 mg, Q6H PRN  polyethylene glycol (GLYCOLAX) packet 17 g, Daily PRN  acetaminophen (TYLENOL) tablet 650 mg, Q6H PRN   Or  acetaminophen (TYLENOL) suppository 650 mg, Q6H PRN  amLODIPine (NORVASC) tablet 5 mg, Nightly        Objective:  /75   Pulse 80   Temp 98.3 °F (36.8 °C) (Oral)   Resp 18   Ht 5' 4\" (1.626 m)   Wt 227 lb 11.2 oz (103.3 kg)   SpO2 92%   BMI 39.08 kg/m²     Intake/Output Summary (Last 24 hours) at 4/5/2022 1109  Last data filed at 4/5/2022 0428  Gross per 24 hour   Intake --   Output 3475 ml   Net -3475 ml      Wt Readings from Last 3 Encounters:   04/03/22 227 lb 11.2 oz (103.3 kg)   03/28/22 238 lb 12.8 oz (108.3 kg)       General appearance:  Appears comfortable  Eyes: Sclera clear. Pupils equal.  ENT: Moist oral mucosa. Trachea midline, no adenopathy. Cardiovascular: Regular rhythm, normal S1, S2. No murmur. No edema in lower extremities  Respiratory: Not using accessory muscles. Good inspiratory effort. Clear to auscultation bilaterally, no wheeze or crackles. GI: Abdomen soft, no tenderness, not distended, normal bowel sounds  Musculoskeletal: No cyanosis in digits, neck supple  Neurology: CN 2-12 grossly intact. No speech or motor deficits  Psych: Normal affect. Alert and oriented in time, place and person  Skin: Warm, dry, normal turgor    Labs and Tests:  CBC:   Recent Labs     04/02/22  1021 04/03/22  0444   WBC 13.3* 6.8   HGB 17.3* 14.2    258     BMP:    Recent Labs     04/04/22  2012 04/05/22  0451 04/05/22  1041   * 139 136   K 3.2* 3.4* 3.1*   CL 93* 99 99   CO2 24 27 24   BUN 59* 52* 46*   CREATININE 3.7* 3.1* 2.8*   GLUCOSE 164* 105* 161*     Hepatic:   No results for input(s): AST, ALT, ALB, BILITOT, ALKPHOS in the last 72 hours. CT Abd/Pelvis 4/2/2022:  1.  Mild infectious or inflammatory enterocolitis to include inflammatory bowel disease. 2. Abnormal endometrial thickening to 1.3 cm.  Recommend further evaluation   with nonemergent pelvic ultrasound. Problem List  Principal Problem:    Renal failure  Resolved Problems:    * No resolved hospital problems. *       Assessment & Plan:   1. Acute renal failure. Suspected secondary to GI losses. Creatinine 7.1 on presentation, compared to 1.0 few days prior. Creatinine trending down with IV fluids, 2.8 today. Renal US pending. Nephrology on board. 2. UTI. UA shows moderate blood, + for nitrites, moderate leukocytes. Received ceftraixone in ER and continued on admission. Urine culture with > 100,000 Salmonella species, transitioned to ciprofloxacin. EKG this am shows QTc 474 msec. 3. Enterocolitis. CT scan suggests mild infectious or inflammatory enterocolitis. Stool studies grew Salmonella. Transitioned to ciprofloxacin, with plan to complete 7 day course. Continue probiotic. Blood cultures with NGTD. 4. Hypokalemia / hyponatremia. Secondary GI losses. Continue replacement. Serum sodium 3.1 this am, she did not tolerate po potassium replacement, will give IV potassium. .    5. Elevated lipase, 689 on presentation. CT scan with no abnormality of liver or pancreas, gall bladder unremarkable without biliary ductal dilatation. Elevation believed likely secondary to vomiting and SHEELA. Trending down. Continue to follow. 6. Endometrial wall thickening. Incidental finding on CT scan. Pelvic ultrasound pending. Diet: ADULT DIET;  Dysphagia - Soft and Bite Sized  Code:Full Code  DVT PPX: heparin      STEVEN Morin - CNP   4/5/2022 11:10 AM

## 2022-04-04 NOTE — PROCEDURES
DIT VASCULAR ACCESS SERVICES:  +COVID R/O ISOLATION:     MIDLINE PLACEMENT:  Preprocedure & timeout done w primary RN Melissa Benjamin; +Midline order verified and Nephrology clearance obtained from Dr. Kieran Lai to place Midline wo arm restrictions; +slight difficulty accessing R basilic vein; stick attempt x2 w successful vascular access to the R Basilic vein; a 53DS midline catheter is placed and advanced wo difficulty or complications; +line is patent w brisk blood return and flushes wo resistance; reported same and ok to use Midline to primary RN; pt tolerated procedure very well; she is instructed to remain in bed at rest for 30 min post procedure in order to promote hemostasis to the insertion site and she agrees to do so; pt is left in a position of comfort w siderails up x2, call light in reach and bed is locked in lowest position    NURSES:  *please replace all existing IV tubing with new IV tubing prior to connecting current infusions to the new Midline. *please refrain from obtaining BPs in the RIGHT arm. All of the above may be sources of infection or damage to the Midline and/or insertion site.     IRVING Molina RN, BSN, Chidi Jones.

## 2022-04-04 NOTE — PROGRESS NOTES
pancreas  -likely 2/2 to vomiting and from SHEELA   -continue hydration  -repeat lipase after hydration    Endometrial wall thickening  -per CT, 1.3 cm  -pending pelvic US pending      Active Hospital Problems    Diagnosis     Renal failure [N19]        Medications:  Reviewed    Infusion Medications    lactated ringers 100 mL/hr at 04/04/22 1427    sodium chloride 25 mL (04/04/22 0926)    sodium chloride       Scheduled Medications    potassium chloride  20 mEq Oral BID WC    cefTRIAXone (ROCEPHIN) IV  1,000 mg IntraVENous Q24H    lactobacillus  1 capsule Oral TID WC    potassium chloride  10 mEq IntraVENous Q1H    magnesium sulfate  2,000 mg IntraVENous Once    [START ON 4/5/2022] ciprofloxacin  400 mg IntraVENous Q24H    heparin (porcine)  5,000 Units SubCUTAneous BID    lidocaine 1 % injection  5 mL IntraDERmal Once    sodium chloride flush  5-40 mL IntraVENous 2 times per day    pantoprazole  40 mg Oral QAM AC    sodium chloride flush  5-40 mL IntraVENous 2 times per day    amLODIPine  5 mg Oral Nightly     PRN Meds: sodium chloride flush, sodium chloride, sodium chloride flush, sodium chloride, ondansetron **OR** ondansetron, polyethylene glycol, acetaminophen **OR** acetaminophen      Intake/Output Summary (Last 24 hours) at 4/4/2022 1622  Last data filed at 4/4/2022 1124  Gross per 24 hour   Intake --   Output 2700 ml   Net -2700 ml       Physical Exam Performed:    BP (!) 140/81   Pulse 87   Temp 97.7 °F (36.5 °C) (Oral)   Resp 18   Ht 5' 4\" (1.626 m)   Wt 227 lb 11.2 oz (103.3 kg)   SpO2 95%   BMI 39.08 kg/m²     General appearance: No apparent distress, appears stated age and cooperative. HEENT: Pupils equal, round, and reactive to light. Conjunctivae/corneas clear. Neck: Supple, with full range of motion. No jugular venous distention. Trachea midline. Respiratory:  Normal respiratory effort. Clear to auscultation, bilaterally without Rales/Wheezes/Rhonchi.   Cardiovascular: Regular rate and rhythm with normal S1/S2 without murmurs, rubs or gallops. Abdomen: Soft, non-tender, non-distended with normal bowel sounds. Musculoskeletal: No clubbing, cyanosis. Trace edema bilaterally to hands and ankles. Full range of motion without deformity. Skin: Skin color, texture, turgor normal.  No rashes or lesions. Neurologic:  Neurovascularly intact without any focal sensory/motor deficits. Cranial nerves: II-XII intact, grossly non-focal.  Psychiatric: Alert and oriented, thought content appropriate, normal insight  Capillary Refill: Brisk,< 3 seconds   Peripheral Pulses: +2 palpable, equal bilaterally       Labs:   Recent Labs     04/02/22  1021 04/03/22  0444   WBC 13.3* 6.8   HGB 17.3* 14.2   HCT 51.1* 41.8    258     Recent Labs     04/02/22  1020 04/02/22  2202 04/02/22  2202 04/03/22  0444 04/03/22  1506 04/04/22  0550 04/04/22  1135   NA   < > 130*   < > 131* 131* 135* 135*   K   < > 2.9*   < > 2.9* 3.0* 2.7* 2.9*   CL   < > 89*   < > 91* 93* 94* 93*   CO2   < > 16*   < > 17* 17* 25 25   BUN   < > 108*   < > 104* 93* 78* 70*   CREATININE   < > 6.6*   < > 6.6* 5.9* 4.8* 4.2*   CALCIUM   < > 7.5*   < > 7.8* 7.7* 7.8* 7.9*   PHOS  --  7.9*  --  7.5*  --  4.4  --     < > = values in this interval not displayed. Recent Labs     04/02/22  1020   AST 12*   ALT 20   BILITOT 0.6   ALKPHOS 93     No results for input(s): INR in the last 72 hours. No results for input(s): Cristina Edwina in the last 72 hours. Urinalysis:      Lab Results   Component Value Date    NITRU POSITIVE 04/02/2022    WBCUA 94 04/02/2022    BACTERIA 4+ 04/02/2022    RBCUA 3-4 04/02/2022    BLOODU MODERATE 04/02/2022    SPECGRAV 1.025 04/02/2022    GLUCOSEU Negative 04/02/2022       Radiology:  CT ABDOMEN PELVIS W IV CONTRAST Additional Contrast? None   Final Result   1. Mild infectious or inflammatory enterocolitis to include inflammatory   bowel disease. 2. Abnormal endometrial thickening to 1.3 cm.   Recommend further evaluation   with nonemergent pelvic ultrasound. US PELVIS COMPLETE    (Results Pending)   US RENAL COMPLETE    (Results Pending)           DVT Prophylaxis: heparin  Diet: ADULT DIET; Dysphagia - Soft and Bite Sized  Code Status: Full Code    PT/OT Eval Status: no acute needs    Dispo - home once medically stable    STEVEN Boo - CNP      NOTE:  This report was transcribed using voice recognition software. Every effort was made to ensure accuracy; however, inadvertent computerized transcription errors may be present.

## 2022-04-04 NOTE — CARE COORDINATION
Discharge Planning Assessment    RN/SW discharge planner met with patient/ (and family member) to discuss reason for admission, current living situation, and potential needs at the time of discharge    Demographics/Insurance verified Yes/medicare    Current type of dwelling: chair lift from garage    Living arrangements: spouse  Level of function/Support:  Mark Lofton MD    DME: no    Active with any community resources/agencies/skilled home care/ HD: no    Medication compliance issues: no  Pharmacy/Financial issues that could impact healthcare: no    Transportation at the time of discharge:  Met with Patient who denied any needs at this time. Patient advised to inform case management if any needs arise. Case management will continue to follow and update discharge plan as needed. Pending ID, RN to offer [facility] teaching prior to discharge, (and an order for an anaphylactic kit if first dose in the home)  and discharge after last dose (via [place] [id number]). Please add IVABX order/dose and STOP date to the [address] [place], [place] [address]s [place] form. Call [facility], [facility] . [phone],[facility] [phone].  Thank you

## 2022-04-04 NOTE — PLAN OF CARE
Problem: Falls - Risk of:  Goal: Will remain free from falls  Description: Will remain free from falls  Outcome: Ongoing     Problem: Fluid Volume:  Goal: Ability to achieve a balanced intake and output will improve  Description: Ability to achieve a balanced intake and output will improve  Outcome: Ongoing

## 2022-04-05 ENCOUNTER — APPOINTMENT (OUTPATIENT)
Dept: ULTRASOUND IMAGING | Age: 67
DRG: 683 | End: 2022-04-05
Payer: MEDICARE

## 2022-04-05 LAB
ANION GAP SERPL CALCULATED.3IONS-SCNC: 13 MMOL/L (ref 3–16)
ANION GAP SERPL CALCULATED.3IONS-SCNC: 13 MMOL/L (ref 3–16)
ANION GAP SERPL CALCULATED.3IONS-SCNC: 16 MMOL/L (ref 3–16)
BUN BLDV-MCNC: 40 MG/DL (ref 7–20)
BUN BLDV-MCNC: 46 MG/DL (ref 7–20)
BUN BLDV-MCNC: 52 MG/DL (ref 7–20)
CALCIUM SERPL-MCNC: 8.3 MG/DL (ref 8.3–10.6)
CALCIUM SERPL-MCNC: 8.6 MG/DL (ref 8.3–10.6)
CALCIUM SERPL-MCNC: 8.6 MG/DL (ref 8.3–10.6)
CHLORIDE BLD-SCNC: 101 MMOL/L (ref 99–110)
CHLORIDE BLD-SCNC: 99 MMOL/L (ref 99–110)
CHLORIDE BLD-SCNC: 99 MMOL/L (ref 99–110)
CO2: 22 MMOL/L (ref 21–32)
CO2: 24 MMOL/L (ref 21–32)
CO2: 27 MMOL/L (ref 21–32)
CREAT SERPL-MCNC: 2.6 MG/DL (ref 0.6–1.2)
CREAT SERPL-MCNC: 2.8 MG/DL (ref 0.6–1.2)
CREAT SERPL-MCNC: 3.1 MG/DL (ref 0.6–1.2)
EKG ATRIAL RATE: 90 BPM
EKG DIAGNOSIS: NORMAL
EKG P AXIS: 35 DEGREES
EKG P-R INTERVAL: 150 MS
EKG Q-T INTERVAL: 388 MS
EKG QRS DURATION: 100 MS
EKG QTC CALCULATION (BAZETT): 474 MS
EKG R AXIS: -17 DEGREES
EKG T AXIS: 38 DEGREES
EKG VENTRICULAR RATE: 90 BPM
GFR AFRICAN AMERICAN: 18
GFR AFRICAN AMERICAN: 20
GFR AFRICAN AMERICAN: 22
GFR NON-AFRICAN AMERICAN: 15
GFR NON-AFRICAN AMERICAN: 17
GFR NON-AFRICAN AMERICAN: 18
GLUCOSE BLD-MCNC: 105 MG/DL (ref 70–99)
GLUCOSE BLD-MCNC: 130 MG/DL (ref 70–99)
GLUCOSE BLD-MCNC: 161 MG/DL (ref 70–99)
HCT VFR BLD CALC: 38.1 % (ref 36–48)
HEMOGLOBIN: 12.7 G/DL (ref 12–16)
LIPASE: 359 U/L (ref 13–60)
MAGNESIUM: 2 MG/DL (ref 1.8–2.4)
MAGNESIUM: 2.2 MG/DL (ref 1.8–2.4)
MCH RBC QN AUTO: 27.8 PG (ref 26–34)
MCHC RBC AUTO-ENTMCNC: 33.4 G/DL (ref 31–36)
MCV RBC AUTO: 83.2 FL (ref 80–100)
PDW BLD-RTO: 14.3 % (ref 12.4–15.4)
PLATELET # BLD: 316 K/UL (ref 135–450)
PMV BLD AUTO: 8.6 FL (ref 5–10.5)
POTASSIUM REFLEX MAGNESIUM: 3.1 MMOL/L (ref 3.5–5.1)
POTASSIUM REFLEX MAGNESIUM: 3.4 MMOL/L (ref 3.5–5.1)
POTASSIUM REFLEX MAGNESIUM: 3.6 MMOL/L (ref 3.5–5.1)
RBC # BLD: 4.58 M/UL (ref 4–5.2)
SODIUM BLD-SCNC: 136 MMOL/L (ref 136–145)
SODIUM BLD-SCNC: 139 MMOL/L (ref 136–145)
SODIUM BLD-SCNC: 139 MMOL/L (ref 136–145)
WBC # BLD: 9.6 K/UL (ref 4–11)

## 2022-04-05 PROCEDURE — 6360000002 HC RX W HCPCS: Performed by: NURSE PRACTITIONER

## 2022-04-05 PROCEDURE — 80048 BASIC METABOLIC PNL TOTAL CA: CPT

## 2022-04-05 PROCEDURE — 76830 TRANSVAGINAL US NON-OB: CPT

## 2022-04-05 PROCEDURE — 2060000000 HC ICU INTERMEDIATE R&B

## 2022-04-05 PROCEDURE — 93010 ELECTROCARDIOGRAM REPORT: CPT | Performed by: INTERNAL MEDICINE

## 2022-04-05 PROCEDURE — 2580000003 HC RX 258: Performed by: INTERNAL MEDICINE

## 2022-04-05 PROCEDURE — 6370000000 HC RX 637 (ALT 250 FOR IP): Performed by: NURSE PRACTITIONER

## 2022-04-05 PROCEDURE — 6370000000 HC RX 637 (ALT 250 FOR IP): Performed by: FAMILY MEDICINE

## 2022-04-05 PROCEDURE — 6360000002 HC RX W HCPCS: Performed by: FAMILY MEDICINE

## 2022-04-05 PROCEDURE — 83735 ASSAY OF MAGNESIUM: CPT

## 2022-04-05 PROCEDURE — 83690 ASSAY OF LIPASE: CPT

## 2022-04-05 PROCEDURE — 85027 COMPLETE CBC AUTOMATED: CPT

## 2022-04-05 PROCEDURE — 36415 COLL VENOUS BLD VENIPUNCTURE: CPT

## 2022-04-05 PROCEDURE — 93005 ELECTROCARDIOGRAM TRACING: CPT | Performed by: NURSE PRACTITIONER

## 2022-04-05 PROCEDURE — 76770 US EXAM ABDO BACK WALL COMP: CPT

## 2022-04-05 PROCEDURE — 2580000003 HC RX 258: Performed by: FAMILY MEDICINE

## 2022-04-05 PROCEDURE — 6370000000 HC RX 637 (ALT 250 FOR IP): Performed by: INTERNAL MEDICINE

## 2022-04-05 RX ORDER — POTASSIUM CHLORIDE 20 MEQ/1
40 TABLET, EXTENDED RELEASE ORAL ONCE
Status: COMPLETED | OUTPATIENT
Start: 2022-04-05 | End: 2022-04-05

## 2022-04-05 RX ORDER — POTASSIUM CHLORIDE 7.45 MG/ML
10 INJECTION INTRAVENOUS
Status: COMPLETED | OUTPATIENT
Start: 2022-04-05 | End: 2022-04-05

## 2022-04-05 RX ORDER — POTASSIUM CHLORIDE 20 MEQ/1
40 TABLET, EXTENDED RELEASE ORAL ONCE
Status: DISCONTINUED | OUTPATIENT
Start: 2022-04-05 | End: 2022-04-05

## 2022-04-05 RX ADMIN — Medication 10 ML: at 09:53

## 2022-04-05 RX ADMIN — PANTOPRAZOLE SODIUM 40 MG: 40 TABLET, DELAYED RELEASE ORAL at 08:00

## 2022-04-05 RX ADMIN — POTASSIUM CHLORIDE 10 MEQ: 7.46 INJECTION, SOLUTION INTRAVENOUS at 14:21

## 2022-04-05 RX ADMIN — HEPARIN SODIUM 5000 UNITS: 5000 INJECTION INTRAVENOUS; SUBCUTANEOUS at 09:52

## 2022-04-05 RX ADMIN — POTASSIUM CHLORIDE 10 MEQ: 7.46 INJECTION, SOLUTION INTRAVENOUS at 19:29

## 2022-04-05 RX ADMIN — ONDANSETRON 4 MG: 2 INJECTION INTRAMUSCULAR; INTRAVENOUS at 04:22

## 2022-04-05 RX ADMIN — AMLODIPINE BESYLATE 5 MG: 5 TABLET ORAL at 21:25

## 2022-04-05 RX ADMIN — Medication 1 CAPSULE: at 13:03

## 2022-04-05 RX ADMIN — Medication 1 CAPSULE: at 09:52

## 2022-04-05 RX ADMIN — POTASSIUM CHLORIDE 10 MEQ: 7.46 INJECTION, SOLUTION INTRAVENOUS at 15:05

## 2022-04-05 RX ADMIN — ONDANSETRON 4 MG: 2 INJECTION INTRAMUSCULAR; INTRAVENOUS at 10:05

## 2022-04-05 RX ADMIN — POTASSIUM CHLORIDE 40 MEQ: 20 TABLET, EXTENDED RELEASE ORAL at 09:53

## 2022-04-05 RX ADMIN — CIPROFLOXACIN 400 MG: 2 INJECTION, SOLUTION INTRAVENOUS at 13:04

## 2022-04-05 RX ADMIN — POTASSIUM CHLORIDE 10 MEQ: 7.46 INJECTION, SOLUTION INTRAVENOUS at 17:33

## 2022-04-05 RX ADMIN — Medication 1 CAPSULE: at 18:04

## 2022-04-05 RX ADMIN — HEPARIN SODIUM 5000 UNITS: 5000 INJECTION INTRAVENOUS; SUBCUTANEOUS at 21:25

## 2022-04-05 ASSESSMENT — PAIN SCALES - GENERAL: PAINLEVEL_OUTOF10: 0

## 2022-04-05 NOTE — DISCHARGE INSTR - COC
Continuity of Care Form    Patient Name: Kenrick Levine   :  1955  MRN:  [id number]    Admit date:  2022  Discharge date:  ***    Code Status Order: Full Code   Advance Directives:      Admitting Physician:  Ivonne Olivares MD  PCP: Emerita Amezquita MD    Discharging Nurse: [facility] Unit/Room#: 9PW-0971/3152-55  Discharging Unit Phone Number: ***    Emergency Contact:   Extended Emergency Contact Information  Primary Emergency Contact: Jaime Melendez  Mobile Phone: [phone]  Relation: Spouse  Preferred language: English   needed?  No    Past Surgical History:  Past Surgical History:   Procedure Laterality Date    ANTERIOR CRUCIATE LIGAMENT REPAIR      Left knee    KNEE CARTILAGE SURGERY      Right knee    TUBAL LIGATION Bilateral        Immunization History:   Immunization History   Administered Date(s) Administered    COVID-19, J&J, PF, 0.5 mL 2021    Influenza Virus Vaccine 2003, 2011    Pneumococcal Polysaccharide (Minzkwhll47) 2022       Active Problems:  Patient Active Problem List   Diagnosis Code    Rheumatoid arthritis involving multiple sites ([facility] M06.9    Skin lesion of face L98.9    Ventral hernia without obstruction or gangrene K43.9    BMI 40.0-44.9, adult ([facility] Z68.41    Mixed hyperlipidemia E78.2    Seasonal allergic rhinitis J30.2    Gastroesophageal reflux disease with esophagitis without hemorrhage K21.00    History of posttraumatic stress disorder (PTSD) Z86.59    Renal failure N19       Isolation/Infection:   Isolation            Contact          Patient Infection Status       Infection Onset Added Last Indicated Last Indicated By Review Planned Expiration Resolved Resolved By    Salmonella 22 GI Bacterial Pathogens By PCR 22       Resolved    COVID-19 (Rule Out) 22 COVID-19 (Ordered)   22 Rule-Out Test Resulted    C-diff Rule Out 22 GI Bacterial Pathogens By PCR (Ordered)   22 Rule-Out Test Resulted            Nurse Assessment:  Last Vital Signs: /75   Pulse 80   Temp 98.3 °F (36.8 °C) (Oral)   Resp 18   Ht 5' 4\" (1.626 m)   Wt 227 lb 11.2 oz (103.3 kg)   SpO2 92%   BMI 39.08 kg/m²     Last documented pain score (0-10 scale): Pain Level: 0  Last Weight:   Wt Readings from Last 1 Encounters:   22 227 lb 11.2 oz (103.3 kg)     Mental Status:  {IP PT MENTAL STATUS:}    IV Access:  { CORRINA IV ACCESS:175889415}    Nursing Mobility/ADLs:  Walking   {CHP DME XYG}  Transfer  {CHP DME QCHY:403218501}  Bathing  {CHP DME FCHK:357523776}  Dressing  {CHP DME DZRJ:093742767}  Toileting  {CHP DME KQFL:171018917}  Feeding  {CHP DME HOHJ:793460886}  Med Admin  {P DME PWDI:307926658}  Med Delivery   { CORRINA MED Delivery:096228928}    Wound Care Documentation and Therapy:        Elimination:  Continence:    Bowel: {YES / UY:03258}  Bladder: {YES / XP:54481}  Urinary Catheter: {Urinary Catheter:840307496}   Colostomy/Ileostomy/Ileal Conduit: {YES / VE:99340}       Date of Last BM: ***    Intake/Output Summary (Last 24 hours) at 2022 1107  Last data filed at 2022 0428  Gross per 24 hour   Intake --   Output 3475 ml   Net -3475 ml     I/O last 3 completed shifts:  In: -   Out: 5075 [Urine:5075]    Safety Concerns:     [address] [employer] Safety Concerns:618994589}    Impairments/Disabilities:      [employer] Impairments/Disabilities:584488818}    Nutrition Therapy:  Current Nutrition Therapy:   [employer] Diet List:931306233}    Routes of Feeding: {P DME Other Feedings:894273480}  Liquids: {Slp liquid thickness:61970}  Daily Fluid Restriction: {CHP DME Yes amt example:971418446}  Last Modified Barium Swallow with Video (Video Swallowing Test): {Done Not Done FEBC:073958614}    Treatments at the Time of Hospital Discharge:   Respiratory Treatments: ***  Oxygen Therapy:  {Therapy; copd oxygen:56922}  Ventilator:    { CC Vent [facility]:611284287}    Rehab Therapies: {THERAPEUTIC INTERVENTION:1709470542}  Weight Bearing Status/Restrictions: [address] CC Weight Bearin}  Other Medical Equipment (for information only, NOT a DME order):  {EQUIPMENT:112330374}  Other Treatments: ***    Patient's personal belongings (please select all that are sent with patient):  {CHP DME Belongings:716388171}    RN SIGNATURE:  {Esignature:661932849}    CASE MANAGEMENT/SOCIAL WORK SECTION    Inpatient Status Date: ***    Readmission Risk Assessment Score:  Readmission Risk              Risk of Unplanned Readmission:  15           Discharging to Facility/ Agency   Name: Lois Reeves will call for Appointment  Phone: [phone]  Fax: [phone]  Phone: [phone]  Fax: [phone]     Dialysis Facility (if applicable)   Name:  Address:  Dialysis Schedule:  Phone:  Fax:    / signature: {Esignature:871426209}    PHYSICIAN SECTION    Prognosis: {Prognosis:0629885231}    Condition at Discharge: [address] Patient Condition:270210782}    Rehab Potential (if transferring to Rehab): {Prognosis:3856890122}    Recommended Labs or Other Treatments After Discharge: ***    Physician Certification: I certify the above information and transfer of Valorie Daniels  is necessary for the continuing treatment of the diagnosis listed and that she requires {Admit to Appropriate Level of Care:89080} for {GREATER/LESS:057072980} 30 days.      Update Admission H&P: {CHP DME [facility] in [facility]147490264}    PHYSICIAN SIGNATURE:  {Esignature:811874154}

## 2022-04-05 NOTE — PROGRESS NOTES
Nutrition Note    RECOMMENDATIONS  1. PO Diet: continue dysphagia soft and bite sized    NUTRITION ASSESSMENT   Pt triggered nutrition evaluation based on admission MST of 2, indicating wt loss and poor po intake. Pt admitted with c/o n/v/d which she contributes to the cause of inadequate oral intake, resulting in wt loss. Pt unsure of exact amount of wt lost, would guess 10-15 lb over past week. She normally eats well and is tolerating dysphagia sofe & bite sized diet now. Pt declines any oral nutrition supplements. Will continue to follow po intake and wt status.  Nutrition Related Findings: Na+ 139, K+ 3.4; diarrhea; LR at 100 mL/hr   Wounds: None   Nutrition Education:  Education not indicated    Nutrition Goals: PO intake 50% or greater     MALNUTRITION ASSESSMENT   Malnutrition Status: No malnutrition    NUTRITION DIAGNOSIS   · Inadequate oral intake related to altered GI function as evidenced by poor intake prior to admission      CURRENT NUTRITION THERAPIES  ADULT DIET; Dysphagia - Soft and Bite Sized     PO Intake: 51-75%   PO Supplement Intake:None Ordered      ANTHROPOMETRICS   Current Height: 5' 4\" (162.6 cm)   Current Weight: 227 lb 11.2 oz (103.3 kg)     Ideal Body Weight (IBW): 120 lbs  (55 kg)        BMI: 38.9    The patient will be monitored per nutrition standards of care. Consult dietitian if additional nutrition interventions are needed prior to RD reassessment.      Cristal Leach RD, LD    Contact: [phone]

## 2022-04-05 NOTE — PROGRESS NOTES
[address]  Requested to check home infusion benefits. [phone] per payer.

## 2022-04-05 NOTE — PROGRESS NOTES
Physician Progress Note      PATIENT:               Jovana Woodward  CSN #:                  [id number]  :                       1955  ADMIT DATE:       2022 9:31 AM  DISCH DATE:  RESPONDING  PROVIDER #:        Feliz Hagen CNP          QUERY TEXT:    Pt admitted with Acute renal failure, suspected secondary to GI losses and has   enterocolitis documented. If possible, please document the type of colitis as   being. .. The medical record reflects the following:  Risk Factors: Presents with emesis and diarrhea  Clinical Indicators: WBC 13.3 on admit, , CT scan showed enterocolitis. CT scan suggests mild infectious or inflammatory enterocolitis. Stool studies   grew Salmonella. Treatment: Received Rocephin, currently on Cipro IV  Options provided:  -- Bacterial Colitis  -- Toxic Colitis  -- Other - I will add my own diagnosis  -- Disagree - Not applicable / Not valid  -- Disagree - Clinically unable to determine / Unknown  -- Refer to Clinical Documentation Reviewer    PROVIDER RESPONSE TEXT:    This patient has bacterial colitis.     Query created by: Kaye Abbott on 2022 3:19 PM      Electronically signed by:  Addy Hagen CNP 2022 3:23 PM

## 2022-04-05 NOTE — CARE COORDINATION
Tila from [facility] ran IV-benefits totaling $31.00/ week and [address] will accept the home care order.

## 2022-04-05 NOTE — PROGRESS NOTES
MD Macey Reece MD Lavaughn Barb, MD                                  Office: [phone]                 Fax: [phone]          [employer]                  NEPHROLOGY INPATIENT PROGRESS NOTE:     PATIENT NAME: Pablo Davis  : 1955  MRN: [id number]            Subjective:   No complaints  No diarrhea today  Asking if verdugo can be removed. Assessment and Plan     1. SHEELA-severe-with decreased urine output.-Slow improvement. Crea peaked at 7.1 from baseline of 1.  UA mod blood, LE and 30 protein. WBC 94. Urine culture showed Salmonella. Will use LR as maintenance IVF. Remove verdugo. Probably prerenal azotemia leading to ATN. 2. Hypotension.-Improved  3. Hypovolemia.-Improved. Input not recorded. 4. Nausea/vomiting/diarrhea. Better. Per Medicine. 5. Hyponatremia. Improved. Rate of correction acceptable. 6. Hypokalemia. -Replace. Mg WNL. 7. Hypomagnesemia -better  8. Metabolic acidosis--improved  9. Elevated lipase.              EXAM  Vitals:    22 0758   BP: 119/75   Pulse: 80   Resp: 18   Temp: 98.3 °F (36.8 °C)   SpO2: 92%       Intake/Output Summary (Last 24 hours) at 2022 1108  Last data filed at 2022 0428  Gross per 24 hour   Intake --   Output 3475 ml   Net -3475 ml     External exam of the ears and nose are normal  HENT: exam is normal  Eyes: Pupils are equal, round  Neck. JVD not visible. No lymph nodes palpable. CVS.  Heart sounds are normal.Palpation of the heart is normal. No murmurs. No pericardial rub. RS. Lung fields are clear   PA soft , bowel sounds are normal no distension and no tenderness to palpation. Skin No rash , No palpable nodules  Musculoskeletal: Normal range of motion. 1+ edema and no tenderness. CNS  No focal deficits        Principal Problem:    Renal failure  Resolved Problems:    * No resolved hospital problems.  *        Medications Reviewed by me   lactobacillus  1 capsule Oral TID WC    magnesium sulfate  2,000 mg IntraVENous Once    ciprofloxacin  400 mg IntraVENous Q24H    heparin (porcine)  5,000 Units SubCUTAneous BID    lidocaine 1 % injection  5 mL IntraDERmal Once    sodium chloride flush  5-40 mL IntraVENous 2 times per day    pantoprazole  40 mg Oral QAM AC    sodium chloride flush  5-40 mL IntraVENous 2 times per day    amLODIPine  5 mg Oral Nightly      lactated ringers 100 mL/hr at 04/04/22 1427    sodium chloride 25 mL (04/04/22 0926)    sodium chloride         Data Review. Labs reviewed by me       CBC:   Recent Labs     04/03/22 0444   WBC 6.8   HGB 14.2   HCT 41.8   MCV 80.7        BMP:   Recent Labs     04/02/22  2202 04/02/22  2202 04/03/22  0444 04/03/22  1506 04/04/22  0550 04/04/22  1135 04/04/22 2012 04/05/22  0451 04/05/22  1041   *   < > 131*   < > 135*   < > 131* 139 136   K 2.9*   < > 2.9*   < > 2.7*   < > 3.2* 3.4* 3.1*   CL 89*   < > 91*   < > 94*   < > 93* 99 99   CO2 16*   < > 17*   < > 25   < > 24 27 24   PHOS 7.9*  --  7.5*  --  4.4  --   --   --   --    *   < > 104*   < > 78*   < > 59* 52* 46*   CREATININE 6.6*   < > 6.6*   < > 4.8*   < > 3.7* 3.1* 2.8*    < > = values in this interval not displayed. Magnesium:   Lab Results   Component Value Date    MG 2.00 04/05/2022    MG 2.20 04/05/2022    MG 2.10 04/04/2022     Lab Results   Component Value Date    CREATININE 2.8 04/05/2022       Arterial Blood Gasses  No results for input(s): PH, PCO2, PO2 in the last 72 hours.     Invalid input(s): [id number], INSPIREDO2    UA:  Recent Labs     04/02/22  1340   COLORU Yellow   PHUR 5.0   WBCUA 94*   RBCUA 3-4   BACTERIA 4+*   CLARITYU SLCLOUDY   SPECGRAV 1.025   LEUKOCYTESUR MODERATE*   UROBILINOGEN 0.2   BILIRUBINUR Negative   BLOODU MODERATE*   GLUCOSEU Negative       LIVER PROFILE:   Recent Labs     04/02/22  2202 04/04/22  1135   LIPASE 594.0* 426.0*     PT/INR:  No results found for: PROTIME, INR  PTT:  No results found for:

## 2022-04-05 NOTE — PROGRESS NOTES
[facility] access hospital able to accept this patient for home care. Discharge planner and [employer]  notified.

## 2022-04-05 NOTE — PLAN OF CARE
Nutrition Problem #1: Inadequate oral intake  Intervention: Food and/or Nutrient Delivery: Continue Current Diet  Nutritional Goals: PO intake 50% or greater

## 2022-04-06 LAB
ANION GAP SERPL CALCULATED.3IONS-SCNC: 11 MMOL/L (ref 3–16)
ANION GAP SERPL CALCULATED.3IONS-SCNC: 13 MMOL/L (ref 3–16)
ANION GAP SERPL CALCULATED.3IONS-SCNC: 14 MMOL/L (ref 3–16)
BUN BLDV-MCNC: 26 MG/DL (ref 7–20)
BUN BLDV-MCNC: 28 MG/DL (ref 7–20)
BUN BLDV-MCNC: 31 MG/DL (ref 7–20)
CALCIUM SERPL-MCNC: 8.3 MG/DL (ref 8.3–10.6)
CALCIUM SERPL-MCNC: 8.5 MG/DL (ref 8.3–10.6)
CALCIUM SERPL-MCNC: 8.7 MG/DL (ref 8.3–10.6)
CHLORIDE BLD-SCNC: 102 MMOL/L (ref 99–110)
CHLORIDE BLD-SCNC: 104 MMOL/L (ref 99–110)
CHLORIDE BLD-SCNC: 104 MMOL/L (ref 99–110)
CO2: 22 MMOL/L (ref 21–32)
CO2: 23 MMOL/L (ref 21–32)
CO2: 25 MMOL/L (ref 21–32)
CREAT SERPL-MCNC: 1.8 MG/DL (ref 0.6–1.2)
CREAT SERPL-MCNC: 2.1 MG/DL (ref 0.6–1.2)
CREAT SERPL-MCNC: 2.2 MG/DL (ref 0.6–1.2)
GFR AFRICAN AMERICAN: 27
GFR AFRICAN AMERICAN: 28
GFR AFRICAN AMERICAN: 34
GFR NON-AFRICAN AMERICAN: 22
GFR NON-AFRICAN AMERICAN: 23
GFR NON-AFRICAN AMERICAN: 28
GLUCOSE BLD-MCNC: 102 MG/DL (ref 70–99)
GLUCOSE BLD-MCNC: 124 MG/DL (ref 70–99)
GLUCOSE BLD-MCNC: 129 MG/DL (ref 70–99)
POTASSIUM REFLEX MAGNESIUM: 3.7 MMOL/L (ref 3.5–5.1)
POTASSIUM REFLEX MAGNESIUM: 3.8 MMOL/L (ref 3.5–5.1)
POTASSIUM REFLEX MAGNESIUM: 3.9 MMOL/L (ref 3.5–5.1)
SODIUM BLD-SCNC: 138 MMOL/L (ref 136–145)
SODIUM BLD-SCNC: 140 MMOL/L (ref 136–145)
SODIUM BLD-SCNC: 140 MMOL/L (ref 136–145)

## 2022-04-06 PROCEDURE — 36415 COLL VENOUS BLD VENIPUNCTURE: CPT

## 2022-04-06 PROCEDURE — 6360000002 HC RX W HCPCS: Performed by: NURSE PRACTITIONER

## 2022-04-06 PROCEDURE — 80048 BASIC METABOLIC PNL TOTAL CA: CPT

## 2022-04-06 PROCEDURE — 2580000003 HC RX 258: Performed by: FAMILY MEDICINE

## 2022-04-06 PROCEDURE — 6370000000 HC RX 637 (ALT 250 FOR IP): Performed by: FAMILY MEDICINE

## 2022-04-06 PROCEDURE — 2580000003 HC RX 258: Performed by: INTERNAL MEDICINE

## 2022-04-06 PROCEDURE — 6370000000 HC RX 637 (ALT 250 FOR IP): Performed by: NURSE PRACTITIONER

## 2022-04-06 PROCEDURE — 2060000000 HC ICU INTERMEDIATE R&B

## 2022-04-06 RX ADMIN — Medication 10 ML: at 08:27

## 2022-04-06 RX ADMIN — Medication 1 CAPSULE: at 16:36

## 2022-04-06 RX ADMIN — Medication 1 CAPSULE: at 11:59

## 2022-04-06 RX ADMIN — AMLODIPINE BESYLATE 5 MG: 5 TABLET ORAL at 20:54

## 2022-04-06 RX ADMIN — Medication 1 CAPSULE: at 08:27

## 2022-04-06 RX ADMIN — PANTOPRAZOLE SODIUM 40 MG: 40 TABLET, DELAYED RELEASE ORAL at 06:54

## 2022-04-06 RX ADMIN — HEPARIN SODIUM 5000 UNITS: 5000 INJECTION INTRAVENOUS; SUBCUTANEOUS at 08:27

## 2022-04-06 RX ADMIN — HEPARIN SODIUM 5000 UNITS: 5000 INJECTION INTRAVENOUS; SUBCUTANEOUS at 20:54

## 2022-04-06 RX ADMIN — SODIUM CHLORIDE 25 ML: 9 INJECTION, SOLUTION INTRAVENOUS at 12:00

## 2022-04-06 RX ADMIN — Medication 10 ML: at 21:00

## 2022-04-06 RX ADMIN — SODIUM CHLORIDE, POTASSIUM CHLORIDE, SODIUM LACTATE AND CALCIUM CHLORIDE: 600; 310; 30; 20 INJECTION, SOLUTION INTRAVENOUS at 11:30

## 2022-04-06 RX ADMIN — CIPROFLOXACIN 400 MG: 2 INJECTION, SOLUTION INTRAVENOUS at 12:01

## 2022-04-06 ASSESSMENT — PAIN SCALES - GENERAL
PAINLEVEL_OUTOF10: 0

## 2022-04-06 NOTE — PROGRESS NOTES
[facility]    4/6/2022 2:23 PM        Name: Sebastian Gomez . Admitted: 4/2/2022  Primary Care Provider: Blake Evans MD (Tel: [phone])      Chief Complaint   Patient presents with    Emesis     pt states started with sinus congestion a weeks ago, havn't eaten in 1 weeks due to emesis and diarrhea, has been able to drink pedialyte, pt states has hernia, now with abd pain     Brief History: Patient is a 75 yo female with hx ventral hernia, GERD, arthritis. She presented to ER with n/v/d ongoing for past 4-5 days. Labs remarkable for creatinine 7.1 compared to 1.0 just few days earlier. CT scan showed enterocolitis. UA suggestive UTI. She received IV fluids and was started on IV antibiotics. Subjective:  Presently sitting on side of bed. States she feels much better. Continues to have diarrhea, reports about 5-6 watery BMs a day. Denies abdominal pain, nausea, cramping. Tolerating diet and taking fluids without issue.      Reviewed interval ancillary notes    Current Medications  lactobacillus (CULTURELLE) capsule 1 capsule, TID WC  lactated ringers infusion, Continuous  magnesium sulfate 2000 mg in 50 mL IVPB premix, Once  ciprofloxacin (CIPRO) IVPB 400 mg, Q24H  heparin (porcine) injection 5,000 Units, BID  lidocaine PF 1 % injection 5 mL, Once  sodium chloride flush 0.9 % injection 5-40 mL, 2 times per day  sodium chloride flush 0.9 % injection 5-40 mL, PRN  0.9 % sodium chloride infusion, PRN  pantoprazole (PROTONIX) tablet 40 mg, QAM AC  sodium chloride flush 0.9 % injection 5-40 mL, 2 times per day  sodium chloride flush 0.9 % injection 5-40 mL, PRN  0.9 % sodium chloride infusion, PRN  ondansetron (ZOFRAN-ODT) disintegrating tablet 4 mg, Q8H PRN   Or  ondansetron (ZOFRAN) injection 4 mg, Q6H PRN  polyethylene glycol (GLYCOLAX) packet 17 g, Daily PRN  acetaminophen (TYLENOL) tablet 650 mg, Q6H PRN   Or  acetaminophen (TYLENOL) suppository 650 mg, Q6H PRN  amLODIPine (NORVASC) tablet 5 mg, Nightly        Objective:  BP (!) 149/77   Pulse 88   Temp 97.6 °F (36.4 °C) (Oral)   Resp 18   Ht 5' 4\" (1.626 m)   Wt 238 lb 3.2 oz (108 kg)   SpO2 93%   BMI 40.89 kg/m²     Intake/Output Summary (Last 24 hours) at 4/6/2022 1423  Last data filed at 4/5/2022 1509  Gross per 24 hour   Intake --   Output 1100 ml   Net -1100 ml      Wt Readings from Last 3 Encounters:   04/06/22 238 lb 3.2 oz (108 kg)   03/28/22 238 lb 12.8 oz (108.3 kg)       General appearance:  Appears comfortable  Eyes: Sclera clear. Pupils equal.  ENT: Moist oral mucosa. Trachea midline, no adenopathy. Cardiovascular: Regular rhythm, normal S1, S2. No murmur. No edema in lower extremities  Respiratory: Not using accessory muscles. Good inspiratory effort. Clear to auscultation bilaterally, no wheeze or crackles. GI: Abdomen soft, no tenderness, not distended, normal bowel sounds  Musculoskeletal: No cyanosis in digits, neck supple  Neurology: CN 2-12 grossly intact. No speech or motor deficits  Psych: Normal affect. Alert and oriented in time, place and person  Skin: Warm, dry, normal turgor    Labs and Tests:  CBC:   Recent Labs     04/05/22  0451   WBC 9.6   HGB 12.7        BMP:    Recent Labs     04/05/22  1809 04/06/22  0651 04/06/22  1141    138 140   K 3.6 3.8 3.9    102 104   CO2 22 23 25   BUN 40* 31* 28*   CREATININE 2.6* 2.2* 2.1*   GLUCOSE 130* 102* 129*     Hepatic:   No results for input(s): AST, ALT, ALB, BILITOT, ALKPHOS in the last 72 hours. CT Abd/Pelvis 4/2/2022:  1. Mild infectious or inflammatory enterocolitis to include inflammatory   bowel disease. 2. Abnormal endometrial thickening to 1.3 cm.  Recommend further evaluation   with nonemergent pelvic ultrasound. Renal Ultrasound 4/5/2022:   Unremarkable ultrasound of the kidneys and urinary bladder.         Transvaginal Ultrasound 4/5/2022:  Complex thickened appearance of the endometrial cavity and stripe in which   correlation to endometrial biopsy should be considered.  Nonvisualization of   both ovaries. Problem List  Principal Problem:    Renal failure  Resolved Problems:    * No resolved hospital problems. *       Assessment & Plan:   1. Acute renal failure. Suspected secondary to GI losses. Creatinine 7.1 on presentation, compared to 1.0 few days prior. Creatinine trending down with IV fluids, 2.8 today. Renal US with no acute findings. Nephrology on board. 2. UTI. UA shows moderate blood, + for nitrites, moderate leukocytes. Received ceftraixone in ER and continued on admission. Urine culture with > 100,000 Salmonella species, transitioned to ciprofloxacin. EKG shows QTc 474 msec. 3. Bacterial enterocolitis. CT scan suggests mild infectious or inflammatory enterocolitis. Stool studies grew Salmonella. Transitioned to ciprofloxacin, with plan to complete 7 day course. Continue probiotic. Blood cultures with NGTD. 4. Hypokalemia / hyponatremia. Secondary GI losses. Resolved and stable. Continue to follow. 5. Elevated lipase, 689->594->359. CT scan with no abnormality of liver or pancreas, gall bladder unremarkable without biliary ductal dilatation. Elevation believed likely secondary to vomiting and SHEELA. Trending down. Continue to follow. 6. Endometrial wall thickening. Incidental finding on CT scan. Transvaginal ultrasound with complex thickened appearance of endometrial cavity, recommend endometrial biopsy. GYN consult placed. Diet: ADULT DIET;  Dysphagia - Soft and Bite Sized  Code:Full Code  DVT PPX: heparin      Iris [place], APRN - CNP   4/6/2022 2:23 PM

## 2022-04-06 NOTE — PROGRESS NOTES
Occupational/Physical Therapy  Orders received and chart reviewed. Attempted to see patient this morning but patient declining due to eating breakfast and reporting she has no need for therapy. Will attempt to see patient in afternoon as schedule allows. Thanks, Augie Navarro [facility] OTR/L [id number]  Sedrick Aponte, PT, DPT #[id number]      Addendum (1880)  PT/OT re-attempted this PM however upon therapist entry pt states \"No bye\". Pt encouraged to complete ADLs and educated on role of inpatient therapy. Pt continues to state she doesn't need therapy and decline. Will follow-up tomorrow as pt status and schedule allow. No charge.   Sedrick Aponte, PT, DPT #[id number]  Augie Navarro, [address] [place] [address] OTR/L [id number]

## 2022-04-06 NOTE — PROGRESS NOTES
MD Danyelle Kinsey MD Bianca Jaeger, MD                                  Office: [phone]                 Fax: [phone]          [facility]                  NEPHROLOGY INPATIENT PROGRESS NOTE:     PATIENT NAME: Tremayne Cohen  : 1955  MRN: [id number]            Subjective:   Has diarrhea today  Verdugo out      Assessment and Plan     1. SHEELA-severe-with decreased urine output.-Slow improvement. Crea peaked at 7.1 from baseline of 1.  UA mod blood, LE and 30 protein. WBC 94. Urine culture showed Salmonella. Continue LR as maintenance IVF. Remove verdugo. Probably prerenal azotemia leading to ATN. 2. Hypotension.-Improved  3. Hypovolemia.-Improved. Input not recorded. 4. Nausea/vomiting/diarrhea. Still has diarrhea. 5. Hyponatremia. Improved. Rate of correction acceptable. 6. Hypokalemia. -Replace. Mg WNL. 7. Hypomagnesemia -better  8. Metabolic acidosis--improved  9. Elevated lipase. 10. Dispo-hopefully tomorrow if crea continues to improve and diarrhea improves.               EXAM  Vitals:    22 1143   BP: (!) 149/77   Pulse: 88   Resp: 18   Temp: 97.6 °F (36.4 °C)   SpO2: 93%       Intake/Output Summary (Last 24 hours) at 2022 1226  Last data filed at 2022 1509  Gross per 24 hour   Intake --   Output 1100 ml   Net -1100 ml     External exam of the ears and nose are normal  HENT: exam is normal  Eyes: Pupils are equal, round  Neck. JVD not visible. No lymph nodes palpable. CVS.  Heart sounds are normal.Palpation of the heart is normal. No murmurs. No pericardial rub. RS. Lung fields are clear   PA soft , bowel sounds are normal no distension and no tenderness to palpation. Skin No rash , No palpable nodules  Musculoskeletal: Normal range of motion. 1+ edema and no tenderness. CNS  No focal deficits        Principal Problem:    Renal failure  Resolved Problems:    * No resolved hospital problems.  *        Medications Reviewed by lelo Giraldo lactobacillus  1 capsule Oral TID     magnesium sulfate  2,000 mg IntraVENous Once    ciprofloxacin  400 mg IntraVENous Q24H    heparin (porcine)  5,000 Units SubCUTAneous BID    lidocaine 1 % injection  5 mL IntraDERmal Once    sodium chloride flush  5-40 mL IntraVENous 2 times per day    pantoprazole  40 mg Oral QAM AC    sodium chloride flush  5-40 mL IntraVENous 2 times per day    amLODIPine  5 mg Oral Nightly      lactated ringers 100 mL/hr at 04/06/22 1130    sodium chloride 25 mL (04/06/22 1200)    sodium chloride         Data Review. Labs reviewed by me       CBC:   Recent Labs     04/05/22  0451   WBC 9.6   HGB 12.7   HCT 38.1   MCV 83.2        BMP:   Recent Labs     04/04/22  0550 04/04/22  1135 04/05/22  1809 04/06/22  0651 04/06/22  1141   *   < > 139 138 140   K 2.7*   < > 3.6 3.8 3.9   CL 94*   < > 101 102 104   CO2 25   < > 22 23 25   PHOS 4.4  --   --   --   --    BUN 78*   < > 40* 31* 28*   CREATININE 4.8*   < > 2.6* 2.2* 2.1*    < > = values in this interval not displayed. Magnesium:   Lab Results   Component Value Date    MG 2.00 04/05/2022    MG 2.20 04/05/2022    MG 2.10 04/04/2022     Lab Results   Component Value Date    CREATININE 2.1 04/06/2022       Arterial Blood Gasses  No results for input(s): PH, PCO2, PO2 in the last 72 hours. Invalid input(s): Floydene Bingham    UA:  No results for input(s): NITRITE, COLORU, PHUR, LABCAST, WBCUA, RBCUA, MUCUS, TRICHOMONAS, YEAST, BACTERIA, CLARITYU, SPECGRAV, LEUKOCYTESUR, UROBILINOGEN, BILIRUBINUR, BLOODU, GLUCOSEU, AMORPHOUS in the last 72 hours.     Invalid input(s): Irven Aaron    LIVER PROFILE:   Recent Labs     04/04/22  1135 04/05/22  0451   LIPASE 426.0* 359.0*     PT/INR:  No results found for: PROTIME, INR  PTT:  No results found for: APTT  PAULINO:  No results found for: ANATITER, PAULINO  CHEMISTRY COMMON GROUP :   Lab Results   Component Value Date    GLUCOSE 129 04/06/2022     Recent Labs 04/05/22  0451 04/05/22  1041 04/05/22  1809 04/06/22  0651 04/06/22  1141   GLUCOSE 105* 161* 130* 102* 129*   CALCIUM 8.6 8.3 8.6 8.5 8.7       Electronically Signed: Carlos Galeas MD 4/6/2022 12:26 PM

## 2022-04-06 NOTE — PLAN OF CARE
Problem: Falls - Risk of:  Goal: Will remain free from falls  Description: Will remain free from falls  4/6/2022 0537 by Maral Vale RN  Outcome: Ongoing  Goal: Absence of physical injury  Description: Absence of physical injury  4/6/2022 0537 by Maral Vale RN  Outcome: Ongoing    Problem: Skin Integrity:  Goal: Will show no infection signs and symptoms  Description: Will show no infection signs and symptoms  4/6/2022 0537 by Maral Vale RN  Outcome: Ongoing  Goal: Absence of new skin breakdown  Description: Absence of new skin breakdown  4/6/2022 0537 by Maral Vale RN  Outcome: Ongoing     Problem: Fluid Volume:  Goal: Ability to achieve a balanced intake and output will improve  Description: Ability to achieve a balanced intake and output will improve  4/6/2022 0537 by Maral Vale RN  Outcome: Ongoing    Problem: Nutrition  Goal: Optimal nutrition therapy  Outcome: Ongoing

## 2022-04-06 NOTE — CONSULTS
Department of Gynecology  Consult Note      Reason for Consult: Thickened endometrium  Requesting Physician:  Sharron Oscar CNP    CHIEF COMPLAINT:   diarrhea    History obtained from patient    HISTORY OF PRESENT ILLNESS:     The patient is a 74 y.o. female admitted with bacterial enterocolitis and acute renal failure. During evaluation she was noted to have an incidental finding of a thickened endometrial lining with fluid. Patient went through menopause at age 46 and has had no bleeding or spotting or other problems since. Last pap years ago, always normal, no established GYN    Past Medical History:        Diagnosis Date    PTSD (post-traumatic stress disorder)     Rheumatoid arthritis ([facility] [address]      Past Surgical History:        Procedure Laterality Date    ANTERIOR CRUCIATE LIGAMENT REPAIR      Left knee    KNEE CARTILAGE SURGERY      Right knee    TUBAL LIGATION Bilateral        Past Gynecological History:    1. Last menstrual period:  Age 46  2.  Menses: postmenopausal    meds:  Current Facility-Administered Medications:     lactobacillus (CULTURELLE) capsule 1 capsule, 1 capsule, Oral, TID WC, STEVEN Aquino CNP, 1 capsule at 04/06/22 1636    lactated ringers infusion, , IntraVENous, Continuous, Thomas Jane MD, Last Rate: 100 mL/hr at 04/06/22 1130, New Bag at 04/06/22 1130    magnesium sulfate 2000 mg in 50 mL IVPB premix, 2,000 mg, IntraVENous, Once, Cecily Ventura MD    ciprofloxacin (CIPRO) IVPB 400 mg, 400 mg, IntraVENous, Q24H, STEVEN Aquino CNP, Stopped at 04/06/22 1403    heparin (porcine) injection 5,000 Units, 5,000 Units, SubCUTAneous, BID, STEVEN Aquino CNP, 5,000 Units at 04/06/22 0827    lidocaine PF 1 % injection 5 mL, 5 mL, IntraDERmal, Once, Thomas Jane MD    sodium chloride flush 0.9 % injection 5-40 mL, 5-40 mL, IntraVENous, 2 times per day, Cecily Ventura MD, 10 mL at 04/06/22 0827    sodium chloride flush 0.9 % injection 5-40 mL, 5-40 mL, IntraVENous, PRN, Thomas Jane MD    0.9 % sodium chloride infusion, 25 mL, IntraVENous, PRN, Georgia Hylton MD, Last Rate: 100 mL/hr at 04/06/22 1200, 25 mL at 04/06/22 1200    pantoprazole (PROTONIX) tablet 40 mg, 40 mg, Oral, QAM AC, Maryse Friedman MD, 40 mg at 04/06/22 0654    sodium chloride flush 0.9 % injection 5-40 mL, 5-40 mL, IntraVENous, 2 times per day, Sterling Kaye MD, 10 mL at 04/06/22 0827    sodium chloride flush 0.9 % injection 5-40 mL, 5-40 mL, IntraVENous, PRN, Sterling Kaye MD    0.9 % sodium chloride infusion, , IntraVENous, PRN, Sterling Kaye MD    ondansetron (ZOFRAN-ODT) disintegrating tablet 4 mg, 4 mg, Oral, Q8H PRN **OR** ondansetron (ZOFRAN) injection 4 mg, 4 mg, IntraVENous, Q6H PRN, Sterling Kaye MD, 4 mg at 04/05/22 1005    polyethylene glycol (GLYCOLAX) packet 17 g, 17 g, Oral, Daily PRN, Sterling Kaye MD    acetaminophen (TYLENOL) tablet 650 mg, 650 mg, Oral, Q6H PRN, 650 mg at 04/02/22 2051 **OR** acetaminophen (TYLENOL) suppository 650 mg, 650 mg, Rectal, Q6H PRN, Sterling Kaye MD    amLODIPine (NORVASC) tablet 5 mg, 5 mg, Oral, Nightly, Maryse Friedman MD, 5 mg at 04/05/22 2125       Allergies:  Penicillins, Rofecoxib, and Sulfa antibiotics     Social History:  TOBACCO:   reports that she has never smoked. She has never used smokeless tobacco.  ETOH:   reports current alcohol use. DRUGS:   reports no history of drug use.     Family History:       Problem Relation Age of Onset   [facility] Depression Mother     Other Mother     Mental Illness Mother     Stroke Mother     Breast Cancer Sister     Other Maternal Grandfather         PHYSICAL EXAM:    Vitals:  /85   Pulse 86   Temp 97.9 °F (36.6 °C) (Oral)   Resp 18   Ht 5' 4\" (1.626 m)   Wt 238 lb 3.2 oz (108 kg)   SpO2 93%   BMI 40.89 kg/m²     CONSTITUTIONAL:  awake, alert, cooperative, no apparent distress, and appears stated age      DATA:  Recent Labs     04/05/22  0451   WBC 9. 6   HGB 12.7   HCT 38.1        Recent Labs     04/05/22  0451 04/05/22  1041 04/05/22  1809 04/06/22  0651 04/06/22  1141    136 139 138 140   K 3.4* 3.1* 3.6 3.8 3.9   CL 99 99 101 102 104   CO2 27 24 22 23 25   BUN 52* 46* 40* 31* 28*   CREATININE 3.1* 2.8* 2.6* 2.2* 2.1*   CALCIUM 8.6 8.3 8.6 8.5 8.7     EXAMINATION:   PELVIC ULTRASOUND       4/5/2022       TECHNIQUE:   Transvaginal pelvic ultrasound was performed.       COMPARISON:   None       HISTORY:   ORDERING SYSTEM PROVIDED HISTORY: endometrial thickening   TECHNOLOGIST PROVIDED HISTORY:       Reason for exam:->endometrial thickening       FINDINGS:       Measurements:       Uterus: 7.9 x 3.4 x 3.3 cm       Endometrial stripe: 10 mm       Right Ovary:Not visualized       Left Ovary: Not visualized           Ultrasound Findings:       Uterus: Uterus demonstrates normal myometrial echotexture.  The uterus is   anteverted.       Endometrial stripe: Endometrial stripe is thickened for a postmenopausal   woman. [place] is fluid identified within the endometrial cavity with   complexity.  Correlation to endometrial biopsy should be considered.       Free Fluid: No evidence of free fluid.           Impression   Complex thickened appearance of the endometrial cavity and stripe in which   correlation to endometrial biopsy should be considered.  Nonvisualization of   both ovaries.             IMPRESSION/RECOMMENDATIONS:      Thickened endometrium with fluid within: no bleeding or other symptoms, d/w patient options for evaluation including endometrial biopsy in the hospital versus f/u in office; patient prefers to f/u as an outpatient; given number for our office and instructed to call and schedule for saline ultrasound and endometrial biopsy

## 2022-04-06 NOTE — CARE COORDINATION
Discharge Planning:  Met with patient regarding her discharge plan. Patient states that she lives at home with family and that she is independent in ADL's and IADL's. Patient states that she anticipates discharging to home with no needs.

## 2022-04-07 VITALS
OXYGEN SATURATION: 96 % | BODY MASS INDEX: 40.72 KG/M2 | TEMPERATURE: 97.8 F | HEIGHT: 64 IN | RESPIRATION RATE: 16 BRPM | WEIGHT: 238.5 LBS | SYSTOLIC BLOOD PRESSURE: 145 MMHG | HEART RATE: 85 BPM | DIASTOLIC BLOOD PRESSURE: 69 MMHG

## 2022-04-07 LAB
ANION GAP SERPL CALCULATED.3IONS-SCNC: 12 MMOL/L (ref 3–16)
ANION GAP SERPL CALCULATED.3IONS-SCNC: 13 MMOL/L (ref 3–16)
ANION GAP SERPL CALCULATED.3IONS-SCNC: 14 MMOL/L (ref 3–16)
BLOOD CULTURE, ROUTINE: NORMAL
BUN BLDV-MCNC: 23 MG/DL (ref 7–20)
BUN BLDV-MCNC: 24 MG/DL (ref 7–20)
BUN BLDV-MCNC: 25 MG/DL (ref 7–20)
CALCIUM SERPL-MCNC: 8.3 MG/DL (ref 8.3–10.6)
CALCIUM SERPL-MCNC: 8.5 MG/DL (ref 8.3–10.6)
CALCIUM SERPL-MCNC: 8.7 MG/DL (ref 8.3–10.6)
CHLORIDE BLD-SCNC: 104 MMOL/L (ref 99–110)
CHLORIDE BLD-SCNC: 104 MMOL/L (ref 99–110)
CHLORIDE BLD-SCNC: 106 MMOL/L (ref 99–110)
CO2: 21 MMOL/L (ref 21–32)
CO2: 23 MMOL/L (ref 21–32)
CO2: 24 MMOL/L (ref 21–32)
CREAT SERPL-MCNC: 1.8 MG/DL (ref 0.6–1.2)
CREAT SERPL-MCNC: 1.9 MG/DL (ref 0.6–1.2)
CREAT SERPL-MCNC: 1.9 MG/DL (ref 0.6–1.2)
CULTURE, BLOOD 2: NORMAL
GFR AFRICAN AMERICAN: 32
GFR AFRICAN AMERICAN: 32
GFR AFRICAN AMERICAN: 34
GFR NON-AFRICAN AMERICAN: 26
GFR NON-AFRICAN AMERICAN: 26
GFR NON-AFRICAN AMERICAN: 28
GLUCOSE BLD-MCNC: 102 MG/DL (ref 70–99)
GLUCOSE BLD-MCNC: 170 MG/DL (ref 70–99)
GLUCOSE BLD-MCNC: 99 MG/DL (ref 70–99)
MAGNESIUM: 1.3 MG/DL (ref 1.8–2.4)
POTASSIUM REFLEX MAGNESIUM: 3.5 MMOL/L (ref 3.5–5.1)
POTASSIUM REFLEX MAGNESIUM: 3.8 MMOL/L (ref 3.5–5.1)
POTASSIUM REFLEX MAGNESIUM: 3.9 MMOL/L (ref 3.5–5.1)
SODIUM BLD-SCNC: 139 MMOL/L (ref 136–145)
SODIUM BLD-SCNC: 140 MMOL/L (ref 136–145)
SODIUM BLD-SCNC: 142 MMOL/L (ref 136–145)

## 2022-04-07 PROCEDURE — 6370000000 HC RX 637 (ALT 250 FOR IP): Performed by: FAMILY MEDICINE

## 2022-04-07 PROCEDURE — 6370000000 HC RX 637 (ALT 250 FOR IP): Performed by: NURSE PRACTITIONER

## 2022-04-07 PROCEDURE — 6360000002 HC RX W HCPCS: Performed by: INTERNAL MEDICINE

## 2022-04-07 PROCEDURE — 80048 BASIC METABOLIC PNL TOTAL CA: CPT

## 2022-04-07 PROCEDURE — 83735 ASSAY OF MAGNESIUM: CPT

## 2022-04-07 PROCEDURE — 2580000003 HC RX 258: Performed by: INTERNAL MEDICINE

## 2022-04-07 PROCEDURE — 36415 COLL VENOUS BLD VENIPUNCTURE: CPT

## 2022-04-07 PROCEDURE — 6360000002 HC RX W HCPCS: Performed by: NURSE PRACTITIONER

## 2022-04-07 PROCEDURE — 2580000003 HC RX 258: Performed by: FAMILY MEDICINE

## 2022-04-07 PROCEDURE — 94761 N-INVAS EAR/PLS OXIMETRY MLT: CPT

## 2022-04-07 RX ORDER — CIPROFLOXACIN 500 MG/1
500 TABLET, FILM COATED ORAL 2 TIMES DAILY
Qty: 22 TABLET | Refills: 0 | Status: SHIPPED | OUTPATIENT
Start: 2022-04-07 | End: 2022-04-15 | Stop reason: SINTOL

## 2022-04-07 RX ORDER — LACTOBACILLUS RHAMNOSUS GG 10B CELL
1 CAPSULE ORAL
Qty: 42 CAPSULE | Refills: 0 | Status: SHIPPED | OUTPATIENT
Start: 2022-04-07 | End: 2022-04-21

## 2022-04-07 RX ORDER — AMLODIPINE BESYLATE 5 MG/1
5 TABLET ORAL NIGHTLY
Qty: 30 TABLET | Refills: 2 | Status: SHIPPED | OUTPATIENT
Start: 2022-04-07 | End: 2022-04-15 | Stop reason: ALTCHOICE

## 2022-04-07 RX ADMIN — MAGNESIUM SULFATE HEPTAHYDRATE 2000 MG: 40 INJECTION, SOLUTION INTRAVENOUS at 08:15

## 2022-04-07 RX ADMIN — PANTOPRAZOLE SODIUM 40 MG: 40 TABLET, DELAYED RELEASE ORAL at 06:50

## 2022-04-07 RX ADMIN — Medication 10 ML: at 10:38

## 2022-04-07 RX ADMIN — Medication 1 CAPSULE: at 10:37

## 2022-04-07 RX ADMIN — HEPARIN SODIUM 5000 UNITS: 5000 INJECTION INTRAVENOUS; SUBCUTANEOUS at 10:37

## 2022-04-07 ASSESSMENT — PAIN SCALES - GENERAL: PAINLEVEL_OUTOF10: 0

## 2022-04-07 NOTE — PROGRESS NOTES
Occupational Physical Therapy    Patient adamantly declined therapy services, asking that therapy does not return to attempt evaluations.  Thank you,  Alma Coreas OTR/L [address] [facility]

## 2022-04-07 NOTE — PLAN OF CARE
Problem: Falls - Risk of:  Goal: Will remain free from falls  Description: Will remain free from falls  Outcome: Ongoing  Goal: Absence of physical injury  Description: Absence of physical injury  Outcome: Ongoing     Problem: Skin Integrity:  Goal: Will show no infection signs and symptoms  Description: Will show no infection signs and symptoms  Outcome: Ongoing  Goal: Absence of new skin breakdown  Description: Absence of new skin breakdown  Outcome: Ongoing     Problem: Fluid Volume:  Goal: Ability to achieve a balanced intake and output will improve  Description: Ability to achieve a balanced intake and output will improve  Outcome: Ongoing

## 2022-04-07 NOTE — PROGRESS NOTES
Data- discharge order received, pt verbalized agreement to discharge, disposition to previous residence, no needs for HHC/DME. Action- discharge instructions prepared and given to pt, pt verbalized understanding. Medication information packet given r/t NEW and/or CHANGED prescriptions emphasizing name/purpose/side effects, pt verbalized understanding. Discharge instruction summary: Diet- general, Activity- as tolerated, Primary Care Physician as follows: Kim Rivera [phone] f/u appointment to be scheduled by pt, immunizations reviewed, prescription medications filled in outpatient pharmacy. Inpatient surgical procedure precautions reviewed: CHF Education reviewed. Pt/ Family has had a total of 60 minutes CHF education this admission encounter. 1. WEIGHT: Admit Weight: 237 lb (107.5 kg) (04/02/22 0937)        Today  Weight: 238 lb 8 oz (108.2 kg) (04/07/22 0756)       2. O2 SAT.: SpO2: 96 % (04/07/22 1318)    Response- Pt belongings gathered, IV removed. Disposition is home (no HHC/DME needs), transported with friend, taken to lobby via w/c w/ this RN, no complications.

## 2022-04-07 NOTE — PROGRESS NOTES
CLINICAL PHARMACY NOTE: MEDS TO BEDS    Total # of Prescriptions Filled: 3   The following medications were delivered to the patient:  · Amlodipine 5  · cipro 500  · acidophilus    Additional Documentation:  [facility] to deliver per David Dennis 3:06 pm   signed for 3 scripts at bedside

## 2022-04-07 NOTE — PROGRESS NOTES
MD Mark Du MD Andrey Ford, MD                                  Office: [phone]                 Fax: [phone]          [employer]                  NEPHROLOGY INPATIENT PROGRESS NOTE:     PATIENT NAME: Valorie Daniels  : 1955  MRN: [id number]            Subjective:   Has diarrhea today still  Verdugo out      Assessment and Plan     1. SHEELA-severe-with decreased urine output.-Slow improvement. Crea peaked at 7.1 from baseline of 1.  UA mod blood, LE and 30 protein. WBC 94. Urine culture showed Salmonella. Stop IVF with edema and acceptable bp. Removed verdugo. Probably prerenal azotemia leading to ATN. 2. Hypotension.-Improved  3. Hypovolemia.-Improved  4. Nausea/vomiting/diarrhea. Still has diarrhea. Per Medicine. 5. Hyponatremia. Improved. Rate of correction acceptable. 6. Hypokalemia. -Replaced. Mg WNL. 7. Hypomagnesemia -better  8. Metabolic acidosis--improved  9. Elevated lipase. 10. Dispo-stable for D/C from renal perspective. F/U with me in 2 weeks. Avoid NSAID's.            EXAM  Vitals:    22 1157   BP: (!) 145/69   Pulse: 85   Resp: 18   Temp: 97.8 °F (36.6 °C)   SpO2: 95%       Intake/Output Summary (Last 24 hours) at 2022 1239  Last data filed at 2022 0111  Gross per 24 hour   Intake 260 ml   Output --   Net 260 ml     External exam of the ears and nose are normal  HENT: exam is normal  Eyes: Pupils are equal, round  Neck. JVD not visible. No lymph nodes palpable. CVS.  Heart sounds are normal.Palpation of the heart is normal. No murmurs. No pericardial rub. RS. Lung fields are clear   PA soft , bowel sounds are normal no distension and no tenderness to palpation. Skin No rash , No palpable nodules  Musculoskeletal: Normal range of motion. 1+ edema and no tenderness. CNS  No focal deficits        Principal Problem:    Renal failure  Resolved Problems:    * No resolved hospital problems.  *        Medications Reviewed by me   lactobacillus  1 capsule Oral TID WC    ciprofloxacin  400 mg IntraVENous Q24H    heparin (porcine)  5,000 Units SubCUTAneous BID    lidocaine 1 % injection  5 mL IntraDERmal Once    sodium chloride flush  5-40 mL IntraVENous 2 times per day    pantoprazole  40 mg Oral QAM AC    sodium chloride flush  5-40 mL IntraVENous 2 times per day    amLODIPine  5 mg Oral Nightly      sodium chloride 25 mL (04/06/22 1200)    sodium chloride         Data Review. Labs reviewed by me       CBC:   Recent Labs     04/05/22  0451   WBC 9.6   HGB 12.7   HCT 38.1   MCV 83.2        BMP:   Recent Labs     04/06/22  2338 04/07/22  0148 04/07/22  0748    139 142   K 3.8 3.5 3.9    104 106   CO2 24 21 23   BUN 25* 24* 23*   CREATININE 1.9* 1.9* 1.8*     Magnesium:   Lab Results   Component Value Date    MG 1.30 04/07/2022    MG 2.00 04/05/2022    MG 2.20 04/05/2022     Lab Results   Component Value Date    CREATININE 1.8 04/07/2022       Arterial Blood Gasses  No results for input(s): PH, PCO2, PO2 in the last 72 hours. Invalid input(s): Barbara Gold    UA:  No results for input(s): NITRITE, COLORU, PHUR, LABCAST, WBCUA, RBCUA, MUCUS, TRICHOMONAS, YEAST, BACTERIA, CLARITYU, SPECGRAV, LEUKOCYTESUR, UROBILINOGEN, BILIRUBINUR, BLOODU, GLUCOSEU, AMORPHOUS in the last 72 hours.     Invalid input(s): Gloria Hubbard    LIVER PROFILE:   Recent Labs     04/05/22 0451   LIPASE 359.0*     PT/INR:  No results found for: PROTIME, INR  PTT:  No results found for: APTT  PAULINO:  No results found for: ANATITER, PAULINO  CHEMISTRY COMMON GROUP :   Lab Results   Component Value Date    GLUCOSE 99 04/07/2022     Recent Labs     04/06/22  1141 04/06/22  1807 04/06/22  2338 04/07/22  0148 04/07/22  0748   GLUCOSE 129* 124* 102* 170* 99   CALCIUM 8.7 8.3 8.7 8.3 8.5       Electronically Signed: Erica Woodard MD 4/7/2022 12:39 PM

## 2022-04-07 NOTE — DISCHARGE SUMMARY
[facility] DISCHARGE SUMMARY    Patient Demographics    Patient. Argelia Win  Date of Birth. 1955  MRN. [id number]     Primary care provider. Blake Evans MD  (Tel: [phone])    Admit date: 4/2/2022    Discharge date (blank if same as Note Date): Note Date: 4/7/2022     Reason for Hospitalization. Chief Complaint   Patient presents with    Emesis     pt states started with sinus congestion a weeks ago, havn't eaten in 1 weeks due to emesis and diarrhea, has been able to drink pedialyte, pt states has hernia, now with abd pain         Significant Findings. Principal Problem:    Renal failure  Resolved Problems:    * No resolved hospital problems. *       Problems and results from this hospitalization that need follow up.  1. GYN follow up for endometrial thickening. Patient to call office to schedule for saline ultrasound and endometrial biopsy. 2. ARF. BMP within 1 week, order in Epic. Nephrology follow up in 2 weeks. 3. Elevated lipase. Recommend recheck level on follow up. 4. HTN. Started on amlodipine. Significant test results and incidental findings. CT Abd/Pelvis 4/2/2022:  1. Mild infectious or inflammatory enterocolitis to include inflammatory   bowel disease. 2. Abnormal endometrial thickening to 1.3 cm.  Recommend further evaluation   with nonemergent pelvic ultrasound.      Renal Ultrasound 4/5/2022: Unremarkable ultrasound of the kidneys and urinary bladder.          Transvaginal Ultrasound 4/5/2022:  Complex thickened appearance of the endometrial cavity and stripe in which   correlation to endometrial biopsy should be considered.  Nonvisualization of   both ovaries. Invasive procedures and treatments. 1. None     Problem-based Hospital Course. Patient is a 73 yo female with hx ventral hernia, GERD, arthritis.  She presented to ER with n/v/d ongoing for past 4-5 days. Labs remarkable for creatinine 7.1 compared to 1.0 just few days earlier. CT scan showed enterocolitis. UA suggestive UTI. She received IV fluids and was started on IV antibiotics. 1. Acute renal failure. Suspected secondary to GI losses. Creatinine 7.1 on presentation, compared to 1.0 few days prior. Creatinine trending down with IV fluids, 1.8 on DC. Renal US with no acute findings. To have recheck BMP within 1 week, order in EPIC. To follow up with nephrology in 2 weeks. 2. UTI. UA shows moderate blood, + for nitrites, moderate leukocytes. Received ceftraixone in ER and continued on admission. Urine culture with > 100,000 Salmonella species, transitioned to ciprofloxacin and Dcd home to complete 14 day course. EKG shows QTc 474 msec. 3. Bacterial enterocolitis. CT scan suggests mild infectious or inflammatory enterocolitis. Stool studies grew Salmonella. She received ciprofloxacin in hospital and on DC to complete 14 day treatment course. Continued probiotic. Blood cultures with NGTD. Continues to note diarrhea but slowly improving. 4. Hypokalemia / hyponatremia. Secondary GI losses. Resolved and stable. 5. Elevated lipase, 689->594->359. CT scan with no abnormality of liver or pancreas, gall bladder unremarkable without biliary ductal dilatation. Elevation believed likely secondary to vomiting and SHEELA. Trending down. 6. Endometrial wall thickening. Incidental finding on CT scan. Transvaginal ultrasound with complex thickened appearance of endometrial cavity, recommend endometrial biopsy. Evaluated by GYN and discussed with patient options for evaluation including endometrial biopsy in the hospital versus f/u in office; patient prefers to f/u as an outpatient; given number for office and instructed to call and schedule for saline ultrasound and endometrial biopsy. 7. HTN. Started on amlodipine with good control. Patient is feeling better and eager for Dc home.  Reviewed DC plans, follow up and medications. Expresses understanding. Questions answered. Patient declined need for home health. Consults. IP CONSULT TO NEPHROLOGY  IP CONSULT TO OB GYN    Physical examination on discharge day. BP (!) 145/69   Pulse 85   Temp 97.8 °F (36.6 °C) (Oral)   Resp 18   Ht 5' 4\" (1.626 m)   Wt 238 lb 8 oz (108.2 kg)   SpO2 95%   BMI 40.94 kg/m²   General appearance. Alert. Looks comfortable. HEENT. Sclera clear. Moist mucus membranes. Cardiovascular. Regular rate and rhythm, normal S1, S2. No murmur. Respiratory. Not using accessory muscles. Clear to auscultation bilaterally, no wheeze. Gastrointestinal. Abdomen soft, non-tender, not distended, normal bowel sounds  Neurology. Facial symmetry. No speech deficits. Moving all extremities equally. Extremities. No edema in lower extremities. Skin. Warm, dry, normal turgor    Condition at time of discharge stable    Medication instructions provided to patient at discharge.      Medication List      START taking these medications    amLODIPine 5 MG tablet  Commonly known as: NORVASC  Take 1 tablet by mouth nightly  Notes to patient: Use: blood pressure  Side effects: dizziness, upset stomach     ciprofloxacin 500 MG tablet  Commonly known as: CIPRO  Take 1 tablet by mouth 2 times daily for 11 days  Notes to patient: Use: treat bacterial infection  Side effects: diarrhea, upset stomach     lactobacillus capsule  Take 1 capsule by mouth 3 times daily (with meals) for 14 days  Notes to patient: Use: probiotic  Side effects: upset stomach        CONTINUE taking these medications    montelukast 10 MG tablet  Commonly known as: SINGULAIR  Notes to patient: Allergies     omeprazole 20 MG EC tablet  Notes to patient: Acid reflux            Where to Get Your Medications      These medications were sent to [facility], [place] [address] [place] [id number]    Phone: [phone]   · amLODIPine 5 MG tablet  · ciprofloxacin 500 MG tablet  · lactobacillus capsule         Discharge recommendations given to patient. Follow Up. PCP in 1 week   Disposition. home  Activity. activity as tolerated  Diet: ADULT DIET; Regular      Spent > 30 minutes in discharge process.     Signed:  STEVEN Flores CNP     4/7/2022 1:08 PM

## 2022-04-11 ENCOUNTER — TELEPHONE (OUTPATIENT)
Dept: INTERNAL MEDICINE CLINIC | Age: 67
End: 2022-04-11

## 2022-04-11 DIAGNOSIS — R11.2 NAUSEA AND VOMITING, INTRACTABILITY OF VOMITING NOT SPECIFIED, UNSPECIFIED VOMITING TYPE: Primary | ICD-10-CM

## 2022-04-11 RX ORDER — ONDANSETRON 4 MG/1
4 TABLET, ORALLY DISINTEGRATING ORAL 3 TIMES DAILY PRN
Qty: 21 TABLET | Refills: 0 | Status: SHIPPED | OUTPATIENT
Start: 2022-04-11 | End: 2022-04-28

## 2022-04-11 NOTE — TELEPHONE ENCOUNTER
----- Message from Roger Hurtado Aas sent at 4/11/2022  7:52 AM EDT -----  Subject: Message to Provider    QUESTIONS  Information for Provider? Patient is still experiencing Nausea, vomiting/   dry heaving and requesting medication to help ease this . Appt on Friday. Please send to Please send to [facility]  ---------------------------------------------------------------------------  --------------  [address]  What is the best way for the office to contact you? OK to leave message on   voicemail  Preferred Call Back Phone Number? [phone]  ---------------------------------------------------------------------------  --------------  SCRIPT ANSWERS  Relationship to Patient?  Self

## 2022-04-12 DIAGNOSIS — N17.9 ACUTE RENAL FAILURE, UNSPECIFIED ACUTE RENAL FAILURE TYPE (HCC): ICD-10-CM

## 2022-04-15 ENCOUNTER — OFFICE VISIT (OUTPATIENT)
Dept: INTERNAL MEDICINE CLINIC | Age: 67
End: 2022-04-15
Payer: MEDICARE

## 2022-04-15 VITALS
HEIGHT: 64 IN | BODY MASS INDEX: 39.64 KG/M2 | WEIGHT: 232.2 LBS | SYSTOLIC BLOOD PRESSURE: 118 MMHG | HEART RATE: 115 BPM | DIASTOLIC BLOOD PRESSURE: 74 MMHG | OXYGEN SATURATION: 96 %

## 2022-04-15 DIAGNOSIS — T50.905A URTICARIA DUE TO DRUG ALLERGY: Primary | ICD-10-CM

## 2022-04-15 DIAGNOSIS — L03.113 CELLULITIS OF RIGHT WRIST: ICD-10-CM

## 2022-04-15 DIAGNOSIS — N17.0 ACUTE RENAL FAILURE WITH TUBULAR NECROSIS (HCC): ICD-10-CM

## 2022-04-15 DIAGNOSIS — E83.51 HYPOCALCEMIA: ICD-10-CM

## 2022-04-15 DIAGNOSIS — L50.0 URTICARIA DUE TO DRUG ALLERGY: Primary | ICD-10-CM

## 2022-04-15 DIAGNOSIS — A02.9: ICD-10-CM

## 2022-04-15 DIAGNOSIS — R03.0 ELEVATED BLOOD-PRESSURE READING, WITHOUT DIAGNOSIS OF HYPERTENSION: ICD-10-CM

## 2022-04-15 DIAGNOSIS — R73.03 PREDIABETES: ICD-10-CM

## 2022-04-15 DIAGNOSIS — R93.89 ENDOMETRIAL THICKENING ON ULTRASOUND: ICD-10-CM

## 2022-04-15 PROCEDURE — G8417 CALC BMI ABV UP PARAM F/U: HCPCS | Performed by: INTERNAL MEDICINE

## 2022-04-15 PROCEDURE — 1036F TOBACCO NON-USER: CPT | Performed by: INTERNAL MEDICINE

## 2022-04-15 PROCEDURE — 99214 OFFICE O/P EST MOD 30 MIN: CPT | Performed by: INTERNAL MEDICINE

## 2022-04-15 PROCEDURE — 3017F COLORECTAL CA SCREEN DOC REV: CPT | Performed by: INTERNAL MEDICINE

## 2022-04-15 PROCEDURE — 1111F DSCHRG MED/CURRENT MED MERGE: CPT | Performed by: INTERNAL MEDICINE

## 2022-04-15 PROCEDURE — 1090F PRES/ABSN URINE INCON ASSESS: CPT | Performed by: INTERNAL MEDICINE

## 2022-04-15 PROCEDURE — G8400 PT W/DXA NO RESULTS DOC: HCPCS | Performed by: INTERNAL MEDICINE

## 2022-04-15 PROCEDURE — 4040F PNEUMOC VAC/ADMIN/RCVD: CPT | Performed by: INTERNAL MEDICINE

## 2022-04-15 PROCEDURE — G8427 DOCREV CUR MEDS BY ELIG CLIN: HCPCS | Performed by: INTERNAL MEDICINE

## 2022-04-15 PROCEDURE — 1123F ACP DISCUSS/DSCN MKR DOCD: CPT | Performed by: INTERNAL MEDICINE

## 2022-04-15 RX ORDER — LEVOFLOXACIN 250 MG/1
250 TABLET ORAL DAILY
Qty: 7 TABLET | Refills: 0 | Status: SHIPPED | OUTPATIENT
Start: 2022-04-15 | End: 2022-04-22 | Stop reason: ALTCHOICE

## 2022-04-15 ASSESSMENT — ENCOUNTER SYMPTOMS
CHEST TIGHTNESS: 0
BACK PAIN: 0
NAUSEA: 1
ABDOMINAL PAIN: 0
SORE THROAT: 0
COLOR CHANGE: 0
CONSTIPATION: 0
WHEEZING: 0
SHORTNESS OF BREATH: 0
VOMITING: 0
COUGH: 0

## 2022-04-15 NOTE — ASSESSMENT & PLAN NOTE
Patient started on amlodipine due to high blood pressure readings however today blood pressure is back to preadmission level and she is breaking in hives which may be secondary to ciprofloxacin or amlodipine.   Advised since blood pressure is stable will discontinue amlodipine, she will continue monitoring blood pressure at home and will reevaluate back in the office in 1 week as she may not need to be on long-term antihypertensive medications given her normal readings shortly before hospital admission and possibility of situational hypertension with acute illness

## 2022-04-15 NOTE — PROGRESS NOTES
ASSESSMENT/PLAN:  1. Urticaria due to drug allergy  Assessment & Plan:   Patient has known history of multiple drug allergies, states she usually breaks out in hives after any antibiotic but sometimes takes up to 2 weeks for that to happen. We will discontinue both amlodipine and ciprofloxacin, recommend to take Benadryl as needed, unfortunately will have to start another antibiotic due to presence of cellulitis and explained to patient chances she may still break in hives since Levaquin is same class as ciprofloxacin but will need to continue Benadryl and call the office if any new or worsening problems  2. Cellulitis of right wrist  Assessment & Plan:   Dorsum of right hand and wrist erythematous, swollen and tender to palpation consistent with subcutaneous cellulitis. Patient currently on ciprofloxacin however breaking out in hives. She is allergic to penicillins, allergic to sulfa, allergic to tetracycline. Advised to keep arm elevated to help with the swelling, will do a trial of Levaquin however advised if no improvement provement in the swelling or if any side effects to call the office, we will reevaluate in 1 week  Orders:  -     levoFLOXacin (LEVAQUIN) 250 MG tablet; Take 1 tablet by mouth daily for 7 days, Disp-7 tablet, R-0Normal  3. Acute renal failure with tubular necrosis [facility])  Assessment & Plan:   Continues to gradually improve and recover, most recent renal function results from April 12 reviewed, still not back to baseline normal but significantly improved from admission. Encouraged to continue adequate fluid intake and follow-up with nephrology as scheduled, maintain low-salt diet  4. Hypocalcemia  Assessment & Plan:   She is expected to follow-up with nephrology in the near future, advised for now to ensure adequate hydration  5.  Salmonella food poisoning  Assessment & Plan:   Most acute symptoms subsided however continues to have residual nausea and loss of appetite, she is able to keep fluids down and diarrhea has subsided. We will discontinue Cipro as stated above due to hives, patient received almost 2 weeks of antibiotics, advised we will continue to monitor and reevaluate in 1 week, she can still use as needed Zofran and stick to  liquid diet and gradually advance  6. Elevated blood-pressure reading, without diagnosis of hypertension  Assessment & Plan:   Patient started on amlodipine due to high blood pressure readings however today blood pressure is back to preadmission level and she is breaking in hives which may be secondary to ciprofloxacin or amlodipine. Advised since blood pressure is stable will discontinue amlodipine, she will continue monitoring blood pressure at home and will reevaluate back in the office in 1 week as she may not need to be on long-term antihypertensive medications given her normal readings shortly before hospital admission and possibility of situational hypertension with acute illness  7. Prediabetes  Assessment & Plan:   Lab results from prior to admission explained to patient, reinforced recommendations for low-carb diet and need for weight loss and will reevaluate in few months, if no significant improvement then will start metformin  8. Endometrial thickening on ultrasound  Assessment & Plan:   Patient aware of need to follow-up as scheduled with gynecology and has the information needed      Return in about 1 week (around 4/22/2022). SUBJECTIVE  HPI:   Patient here to follow-up on recent hospitalization, she ended up in the hospital with acute Salmonella food poisoning about 2 to 3 days from her initial visit in the office. She was having nonstop vomiting and diarrhea that resulted in acute renal failure. She was released home and is expected to follow-up with nephrology, she had repeat kidney function 2 days ago.   Today she is having pain and swelling in the right hand and wrist with redness and warmth to touch, she is also breaking in rash on her Musculoskeletal:         General: Normal range of motion. Cervical back: Neck supple. Skin:     General: Skin is warm and dry. Findings: Bruising, erythema and rash present. Neurological:      General: No focal deficit present. Mental Status: She is alert. Cranial Nerves: No cranial nerve deficit. Psychiatric:         Mood and Affect: Mood normal.           Electronically signed by Kim Rivera MD on 4/15/2022 at 10:48 AM.    This dictation was generated by voice recognition computer software. Although all attempts are made to edit the dictation for accuracy, there may be errors in the transcription that are not intended.

## 2022-04-15 NOTE — ASSESSMENT & PLAN NOTE
Most acute symptoms subsided however continues to have residual nausea and loss of appetite, she is able to keep fluids down and diarrhea has subsided.   We will discontinue Cipro as stated above due to hives, patient received almost 2 weeks of antibiotics, advised we will continue to monitor and reevaluate in 1 week, she can still use as needed Zofran and stick to  liquid diet and gradually advance

## 2022-04-15 NOTE — ASSESSMENT & PLAN NOTE
Patient has known history of multiple drug allergies, states she usually breaks out in hives after any antibiotic but sometimes takes up to 2 weeks for that to happen.   We will discontinue both amlodipine and ciprofloxacin, recommend to take Benadryl as needed, unfortunately will have to start another antibiotic due to presence of cellulitis and explained to patient chances she may still break in hives since Levaquin is same class as ciprofloxacin but will need to continue Benadryl and call the office if any new or worsening problems

## 2022-04-15 NOTE — ASSESSMENT & PLAN NOTE
Lab results from prior to admission explained to patient, reinforced recommendations for low-carb diet and need for weight loss and will reevaluate in few months, if no significant improvement then will start metformin

## 2022-04-15 NOTE — ASSESSMENT & PLAN NOTE
Dorsum of right hand and wrist erythematous, swollen and tender to palpation consistent with subcutaneous cellulitis. Patient currently on ciprofloxacin however breaking out in hives. She is allergic to penicillins, allergic to sulfa, allergic to tetracycline.   Advised to keep arm elevated to help with the swelling, will do a trial of Levaquin however advised if no improvement provement in the swelling or if any side effects to call the office, we will reevaluate in 1 week

## 2022-04-15 NOTE — ASSESSMENT & PLAN NOTE
Continues to gradually improve and recover, most recent renal function results from April 12 reviewed, still not back to baseline normal but significantly improved from admission.   Encouraged to continue adequate fluid intake and follow-up with nephrology as scheduled, maintain low-salt diet

## 2022-04-22 ENCOUNTER — OFFICE VISIT (OUTPATIENT)
Dept: INTERNAL MEDICINE CLINIC | Age: 67
End: 2022-04-22
Payer: MEDICARE

## 2022-04-22 VITALS
HEIGHT: 64 IN | DIASTOLIC BLOOD PRESSURE: 82 MMHG | OXYGEN SATURATION: 96 % | WEIGHT: 226.2 LBS | BODY MASS INDEX: 38.62 KG/M2 | HEART RATE: 112 BPM | SYSTOLIC BLOOD PRESSURE: 138 MMHG

## 2022-04-22 DIAGNOSIS — R11.2 INTRACTABLE VOMITING WITH NAUSEA, UNSPECIFIED VOMITING TYPE: ICD-10-CM

## 2022-04-22 DIAGNOSIS — N17.0 ACUTE RENAL FAILURE WITH ACUTE TUBULAR NECROSIS SUPERIMPOSED ON STAGE 1 CHRONIC KIDNEY DISEASE (HCC): ICD-10-CM

## 2022-04-22 DIAGNOSIS — L50.0 URTICARIA DUE TO DRUG ALLERGY: ICD-10-CM

## 2022-04-22 DIAGNOSIS — L03.113 CELLULITIS OF RIGHT WRIST: Primary | ICD-10-CM

## 2022-04-22 DIAGNOSIS — D63.1 ANEMIA IN CHRONIC KIDNEY DISEASE, UNSPECIFIED CKD STAGE: ICD-10-CM

## 2022-04-22 DIAGNOSIS — R03.0 ELEVATED BLOOD-PRESSURE READING, WITHOUT DIAGNOSIS OF HYPERTENSION: ICD-10-CM

## 2022-04-22 DIAGNOSIS — T50.905A URTICARIA DUE TO DRUG ALLERGY: ICD-10-CM

## 2022-04-22 DIAGNOSIS — N18.1 ACUTE RENAL FAILURE WITH ACUTE TUBULAR NECROSIS SUPERIMPOSED ON STAGE 1 CHRONIC KIDNEY DISEASE (HCC): ICD-10-CM

## 2022-04-22 DIAGNOSIS — N18.9 ANEMIA IN CHRONIC KIDNEY DISEASE, UNSPECIFIED CKD STAGE: ICD-10-CM

## 2022-04-22 PROBLEM — R11.10 INTRACTABLE VOMITING: Status: ACTIVE | Noted: 2022-04-22

## 2022-04-22 LAB
ALBUMIN SERPL-MCNC: 3.4 G/DL (ref 3.4–5)
ANION GAP SERPL CALCULATED.3IONS-SCNC: 17 MMOL/L (ref 3–16)
BUN BLDV-MCNC: 8 MG/DL (ref 7–20)
CALCIUM SERPL-MCNC: 9.5 MG/DL (ref 8.3–10.6)
CHLORIDE BLD-SCNC: 98 MMOL/L (ref 99–110)
CO2: 22 MMOL/L (ref 21–32)
CREAT SERPL-MCNC: 1.2 MG/DL (ref 0.6–1.2)
CREATININE URINE: 191 MG/DL (ref 28–259)
GFR AFRICAN AMERICAN: 54
GFR NON-AFRICAN AMERICAN: 45
GLUCOSE BLD-MCNC: 104 MG/DL (ref 70–99)
HCT VFR BLD CALC: 37.1 % (ref 36–48)
HEMOGLOBIN: 12.3 G/DL (ref 12–16)
MCH RBC QN AUTO: 27.6 PG (ref 26–34)
MCHC RBC AUTO-ENTMCNC: 33 G/DL (ref 31–36)
MCV RBC AUTO: 83.6 FL (ref 80–100)
PDW BLD-RTO: 14.1 % (ref 12.4–15.4)
PHOSPHORUS: 3.1 MG/DL (ref 2.5–4.9)
PLATELET # BLD: 563 K/UL (ref 135–450)
PMV BLD AUTO: 7.6 FL (ref 5–10.5)
POTASSIUM SERPL-SCNC: 4 MMOL/L (ref 3.5–5.1)
PROTEIN PROTEIN: 23 MG/DL
PROTEIN/CREAT RATIO: 0.1 MG/DL
RBC # BLD: 4.44 M/UL (ref 4–5.2)
SODIUM BLD-SCNC: 137 MMOL/L (ref 136–145)
WBC # BLD: 8.8 K/UL (ref 4–11)

## 2022-04-22 PROCEDURE — 99214 OFFICE O/P EST MOD 30 MIN: CPT | Performed by: INTERNAL MEDICINE

## 2022-04-22 PROCEDURE — 1090F PRES/ABSN URINE INCON ASSESS: CPT | Performed by: INTERNAL MEDICINE

## 2022-04-22 PROCEDURE — 1036F TOBACCO NON-USER: CPT | Performed by: INTERNAL MEDICINE

## 2022-04-22 PROCEDURE — 3017F COLORECTAL CA SCREEN DOC REV: CPT | Performed by: INTERNAL MEDICINE

## 2022-04-22 PROCEDURE — 1123F ACP DISCUSS/DSCN MKR DOCD: CPT | Performed by: INTERNAL MEDICINE

## 2022-04-22 PROCEDURE — 1111F DSCHRG MED/CURRENT MED MERGE: CPT | Performed by: INTERNAL MEDICINE

## 2022-04-22 PROCEDURE — G8417 CALC BMI ABV UP PARAM F/U: HCPCS | Performed by: INTERNAL MEDICINE

## 2022-04-22 PROCEDURE — G8427 DOCREV CUR MEDS BY ELIG CLIN: HCPCS | Performed by: INTERNAL MEDICINE

## 2022-04-22 PROCEDURE — G8400 PT W/DXA NO RESULTS DOC: HCPCS | Performed by: INTERNAL MEDICINE

## 2022-04-22 PROCEDURE — 4040F PNEUMOC VAC/ADMIN/RCVD: CPT | Performed by: INTERNAL MEDICINE

## 2022-04-22 RX ORDER — PROMETHAZINE HYDROCHLORIDE 25 MG/1
25 TABLET ORAL 3 TIMES DAILY PRN
Qty: 12 TABLET | Refills: 0 | Status: SHIPPED | OUTPATIENT
Start: 2022-04-22 | End: 2022-04-28

## 2022-04-22 RX ORDER — LEVOFLOXACIN 250 MG/1
250 TABLET ORAL DAILY
Qty: 4 TABLET | Refills: 0 | Status: SHIPPED | OUTPATIENT
Start: 2022-04-22 | End: 2022-04-26

## 2022-04-22 ASSESSMENT — ENCOUNTER SYMPTOMS
WHEEZING: 0
NAUSEA: 1
CHEST TIGHTNESS: 0
COLOR CHANGE: 0
BACK PAIN: 0
COUGH: 0
SORE THROAT: 0
SHORTNESS OF BREATH: 0
VOMITING: 1
ABDOMINAL PAIN: 0
CONSTIPATION: 0

## 2022-04-22 NOTE — PROGRESS NOTES
ASSESSMENT/PLAN:  1. Cellulitis of right wrist  Assessment & Plan: This has almost resolved completely with significant improvement from last visit, advised patient will complete antibiotic to a total of 10 days since tolerating well without side effects, no further intervention  Orders:  -     levoFLOXacin (LEVAQUIN) 250 MG tablet; Take 1 tablet by mouth daily for 4 days, Disp-4 tablet, R-0Normal  2. Urticaria due to drug allergy  Assessment & Plan: This has resolved, unable to determine if it is secondary to amlodipine or ciprofloxacin however advised patient no need to be on either medication at this point and encouraged to call the office if any new problems  3. Intractable vomiting with nausea, unspecified vomiting type  Assessment & Plan:   Patient is still reporting daily nausea and vomiting, Zofran does not help all the time, diarrhea has stopped but unable to explain why still having ongoing nausea and vomiting. She is taking omeprazole and denies any abdominal pain or heartburn. Advised will try Phenergan and will refer to GI for further evaluation since she needs to see them to complete colonoscopy will now probably need upper GI if still no complete resolution of symptoms  Orders:  -     Charanjit Jacobs MD, Gastroenterology, [facility]  -     promethazine (PHENERGAN) 25 MG tablet; Take 1 tablet by mouth 3 times daily as needed for Nausea, Disp-12 tablet, R-0Normal  4. Elevated blood-pressure reading, without diagnosis of hypertension  Assessment & Plan:   Blood pressure is stable after discontinuing amlodipine, will continue to monitor off antihypertensive medications, reinforced recommendations for maintaining healthy low-salt low-fat diet and continuing attempts for weight loss once she is over her current acute illness  5.  Acute renal failure with acute tubular necrosis superimposed on stage 1 chronic kidney disease ([facility] [address]  Assessment & Plan:  Secondary to acute dehydration and Salmonella food poisoning, improving, will have updated lab work ordered by nephrology completed today, labs done last week continued to show gradual improvement however patient is with persistent nausea and vomiting as stated above. We will follow along with nephrology      Return in about 4 weeks (around 5/20/2022). SUBJECTIVE  HPI:   Patient here for 1 week follow-up on right wrist cellulitis, was started on Levaquin after she broke in hives following discharge from hospital on amlodipine and ciprofloxacin. States hand pain resolved and swelling improved significantly, she is however still experiencing nausea and vomiting and states she took last Levaquin yesterday but she believes she threw it up. She is not able to tolerate any solid foods the only thing that she can keep down will be soaked      Review of Systems   Constitutional: Negative for activity change, appetite change, fatigue and unexpected weight change. HENT: Negative for congestion, hearing loss, mouth sores and sore throat. Respiratory: Negative for cough, chest tightness, shortness of breath and wheezing. Cardiovascular: Negative for chest pain, palpitations and leg swelling. Gastrointestinal: Positive for nausea and vomiting. Negative for abdominal pain and constipation. Genitourinary: Negative for difficulty urinating, dysuria, frequency, hematuria and urgency. Musculoskeletal: Positive for arthralgias. Negative for back pain, gait problem and joint swelling. Skin: Positive for rash. Negative for color change. Allergic/Immunologic: Negative for environmental allergies and immunocompromised state. Neurological: Negative for dizziness, light-headedness and headaches. Psychiatric/Behavioral: Negative for behavioral problems and dysphoric mood.        OBJECTIVE:    /82   Pulse 112   Ht 5' 4\" (1.626 m)   Wt 226 lb 3.2 oz (102.6 kg)   SpO2 96%   BMI 38.83 kg/m²    Physical Exam  Constitutional:       General: She is not in acute distress. Appearance: Normal appearance. She is obese. HENT:      Head: Normocephalic. Mouth/Throat:      Mouth: Mucous membranes are moist.   Cardiovascular:      Rate and Rhythm: Normal rate. Pulmonary:      Effort: Pulmonary effort is normal. No respiratory distress. Abdominal:      Palpations: Abdomen is soft. Tenderness: There is no abdominal tenderness. Musculoskeletal:      Cervical back: Neck supple. Skin:     General: Skin is warm and dry. Neurological:      General: No focal deficit present. Mental Status: She is alert. Psychiatric:         Mood and Affect: Mood normal.           Electronically signed by Davian Carmen MD on 4/22/2022 at 10:42 AM.    This dictation was generated by voice recognition computer software. Although all attempts are made to edit the dictation for accuracy, there may be errors in the transcription that are not intended.

## 2022-04-22 NOTE — ASSESSMENT & PLAN NOTE
Patient is still reporting daily nausea and vomiting, Zofran does not help all the time, diarrhea has stopped but unable to explain why still having ongoing nausea and vomiting. She is taking omeprazole and denies any abdominal pain or heartburn.   Advised will try Phenergan and will refer to GI for further evaluation since she needs to see them to complete colonoscopy will now probably need upper GI if still no complete resolution of symptoms

## 2022-04-22 NOTE — ASSESSMENT & PLAN NOTE
This has almost resolved completely with significant improvement from last visit, advised patient will complete antibiotic to a total of 10 days since tolerating well without side effects, no further intervention

## 2022-04-22 NOTE — ASSESSMENT & PLAN NOTE
Blood pressure is stable after discontinuing amlodipine, will continue to monitor off antihypertensive medications, reinforced recommendations for maintaining healthy low-salt low-fat diet and continuing attempts for weight loss once she is over her current acute illness

## 2022-04-22 NOTE — ASSESSMENT & PLAN NOTE
This has resolved, unable to determine if it is secondary to amlodipine or ciprofloxacin however advised patient no need to be on either medication at this point and encouraged to call the office if any new problems

## 2022-04-28 PROBLEM — N17.9 AKI (ACUTE KIDNEY INJURY) (HCC): Status: ACTIVE | Noted: 2022-04-28

## 2022-05-18 ENCOUNTER — HOSPITAL ENCOUNTER (OUTPATIENT)
Dept: GENERAL RADIOLOGY | Age: 67
Discharge: HOME | End: 2022-05-18
Payer: MEDICARE

## 2022-05-18 ENCOUNTER — HOSPITAL ENCOUNTER (OUTPATIENT)
Dept: WOMENS IMAGING | Age: 67
Discharge: HOME | End: 2022-05-18
Payer: MEDICARE

## 2022-05-18 VITALS — HEIGHT: 63 IN | WEIGHT: 226 LBS | BODY MASS INDEX: 40.04 KG/M2

## 2022-05-18 DIAGNOSIS — Z12.31 BREAST CANCER SCREENING BY MAMMOGRAM: ICD-10-CM

## 2022-05-18 DIAGNOSIS — M81.0 AGE-RELATED OSTEOPOROSIS WITHOUT CURRENT PATHOLOGICAL FRACTURE: ICD-10-CM

## 2022-05-18 PROCEDURE — 77067 SCR MAMMO BI INCL CAD: CPT

## 2022-05-18 PROCEDURE — 77080 DXA BONE DENSITY AXIAL: CPT

## 2022-05-31 DIAGNOSIS — R92.8 ABNORMAL MAMMOGRAM OF RIGHT BREAST: Primary | ICD-10-CM

## 2022-06-01 DIAGNOSIS — N17.9 AKI (ACUTE KIDNEY INJURY) (HCC): ICD-10-CM

## 2022-06-01 LAB
ALBUMIN SERPL-MCNC: 4.2 G/DL (ref 3.4–5)
ANION GAP SERPL CALCULATED.3IONS-SCNC: 16 MMOL/L (ref 3–16)
BUN BLDV-MCNC: 12 MG/DL (ref 7–20)
CALCIUM SERPL-MCNC: 8.8 MG/DL (ref 8.3–10.6)
CHLORIDE BLD-SCNC: 102 MMOL/L (ref 99–110)
CO2: 21 MMOL/L (ref 21–32)
CREAT SERPL-MCNC: 1 MG/DL (ref 0.6–1.2)
CREATININE URINE: 123.7 MG/DL (ref 28–259)
GFR AFRICAN AMERICAN: >60
GFR NON-AFRICAN AMERICAN: 55
GLUCOSE BLD-MCNC: 113 MG/DL (ref 70–99)
HCT VFR BLD CALC: 38.4 % (ref 36–48)
HEMOGLOBIN: 12.5 G/DL (ref 12–16)
MCH RBC QN AUTO: 27.3 PG (ref 26–34)
MCHC RBC AUTO-ENTMCNC: 32.7 G/DL (ref 31–36)
MCV RBC AUTO: 83.6 FL (ref 80–100)
PDW BLD-RTO: 16.2 % (ref 12.4–15.4)
PHOSPHORUS: 2.7 MG/DL (ref 2.5–4.9)
PLATELET # BLD: 339 K/UL (ref 135–450)
PMV BLD AUTO: 8 FL (ref 5–10.5)
POTASSIUM SERPL-SCNC: 3.8 MMOL/L (ref 3.5–5.1)
PROTEIN PROTEIN: 31 MG/DL
PROTEIN/CREAT RATIO: 0.3 MG/DL
RBC # BLD: 4.6 M/UL (ref 4–5.2)
SODIUM BLD-SCNC: 139 MMOL/L (ref 136–145)
WBC # BLD: 9.6 K/UL (ref 4–11)

## 2022-06-07 PROBLEM — I10 PRIMARY HYPERTENSION: Status: ACTIVE | Noted: 2022-06-07

## 2022-06-08 RX ORDER — LORATADINE 10 MG/1
10 CAPSULE, LIQUID FILLED ORAL PRN
COMMUNITY

## 2022-06-08 NOTE — PROGRESS NOTES
Patient __X_ reached   _____not reached-preop instructions left on voice mail_____________      DATE__6/16/22______ TIME__1155______ARRIVAL___1030  FEC______      Nothing to eat or drink after midnight the night before,except for what the prep instructions call for. If you do not have the instructions or do not understand them please contact your doctors office. Follow any instructions your doctors office has given you including what medications to take the AM of your procedure and which ones to hold. You may use your inhalers. If you take a long acting insulin the sree prior please cut the dose in half and take no diabetic medications that AM.Follow specific doctors office instructions regarding blood thinners and if they want you to hold and for how long. If you are on a blood thinner and have no instructions please contact the office and ask. Dress comfortably,bring your insurance card,picture ID,and a complete list of medications, including supplements. You must have a responsible adult to stay with you during the procedure,drive you home and stay with you. [facility] phone number [phone] for any questions. OTHER INSTRUCTIONS(if applicable)___take amlodipine am of procedure______________________________________________________        VISITOR POLICY(subject to change)    Current visitor policy is 2 visitors per patient. No children allowed. Mask are required. Visiting hours are 8a-8p. Overnight visitors will be at the discretion of the nurse.

## 2022-06-13 ENCOUNTER — HOSPITAL ENCOUNTER (OUTPATIENT)
Dept: NUCLEAR MEDICINE | Age: 67
Discharge: HOME | End: 2022-06-13
Payer: MEDICARE

## 2022-06-13 DIAGNOSIS — R11.2 NAUSEA AND VOMITING, INTRACTABILITY OF VOMITING NOT SPECIFIED, UNSPECIFIED VOMITING TYPE: ICD-10-CM

## 2022-06-13 PROCEDURE — A9541 TC99M SULFUR COLLOID: HCPCS | Performed by: INTERNAL MEDICINE

## 2022-06-13 PROCEDURE — 78264 GASTRIC EMPTYING IMG STUDY: CPT

## 2022-06-13 PROCEDURE — 3430000000 HC RX DIAGNOSTIC RADIOPHARMACEUTICAL: Performed by: INTERNAL MEDICINE

## 2022-06-13 RX ADMIN — Medication 0.9 MILLICURIE: at 08:11

## 2022-06-14 ENCOUNTER — HOSPITAL ENCOUNTER (OUTPATIENT)
Dept: ULTRASOUND IMAGING | Age: 67
Discharge: HOME | End: 2022-06-14
Payer: MEDICARE

## 2022-06-14 ENCOUNTER — PRE-PROCEDURE TELEPHONE (OUTPATIENT)
Dept: WOMENS IMAGING | Age: 67
End: 2022-06-14

## 2022-06-14 ENCOUNTER — HOSPITAL ENCOUNTER (OUTPATIENT)
Dept: WOMENS IMAGING | Age: 67
Discharge: HOME | End: 2022-06-14
Payer: MEDICARE

## 2022-06-14 DIAGNOSIS — R92.8 ABNORMAL MAMMOGRAM OF BOTH BREASTS: Primary | ICD-10-CM

## 2022-06-14 DIAGNOSIS — R92.8 ABNORMAL MAMMOGRAM OF RIGHT BREAST: ICD-10-CM

## 2022-06-14 PROCEDURE — 77065 DX MAMMO INCL CAD UNI: CPT

## 2022-06-14 PROCEDURE — 76642 ULTRASOUND BREAST LIMITED: CPT

## 2022-06-14 NOTE — PROGRESS NOTES
Imaging Navigator reviewed pre-procedure ultrasound guided breast biopsy patient education information in person and gave written instructions. Reviewed medications and need to hold all blood thinners per instructions. None to hold   Patient should take all other medications as prescribed. Patient can eat and drink as normal prior to the procedure. Be sure to wear a bra with good support and a two piece outfit for comfort. Patient can bring someone with you but you can also drive yourself. Plan on being at the breast center for 2-2 and a half hours. Reviewed the process of a ultrasound biopsy. The skin is cleaned and a local anesthetic is given to numb the area. A small skin nick is made for the biopsy needle and then tissue samples are taken. A tiny marker is then placed inside your breast at the site of the biopsy for future reference. Pressure is then held on the biopsy site to stop bleeding and steri strips, bandage and waterproof dressing is applied. A mammogram is then done to validate the tissue marker. The tissue sample is sent to pathology. Results will come back in 2-3 business days and sent to your referring physician. Either they or the nurse navigator will call you with the results and recommended follow up needed. Patients states understanding.

## 2022-06-16 ENCOUNTER — ANESTHESIA EVENT (OUTPATIENT)
Dept: ENDOSCOPY | Age: 67
End: 2022-06-16
Payer: MEDICARE

## 2022-06-16 ENCOUNTER — HOSPITAL ENCOUNTER (OUTPATIENT)
Age: 67
Setting detail: OUTPATIENT SURGERY
Discharge: HOME | End: 2022-06-16
Attending: INTERNAL MEDICINE | Admitting: INTERNAL MEDICINE
Payer: MEDICARE

## 2022-06-16 ENCOUNTER — ANESTHESIA (OUTPATIENT)
Dept: ENDOSCOPY | Age: 67
End: 2022-06-16
Payer: MEDICARE

## 2022-06-16 VITALS
OXYGEN SATURATION: 97 % | RESPIRATION RATE: 16 BRPM | BODY MASS INDEX: 39.34 KG/M2 | HEART RATE: 92 BPM | DIASTOLIC BLOOD PRESSURE: 95 MMHG | SYSTOLIC BLOOD PRESSURE: 146 MMHG | WEIGHT: 222 LBS | TEMPERATURE: 97.1 F | HEIGHT: 63 IN

## 2022-06-16 DIAGNOSIS — K52.9 ENTEROCOLITIS: ICD-10-CM

## 2022-06-16 DIAGNOSIS — R11.2 NAUSEA AND VOMITING, INTRACTABILITY OF VOMITING NOT SPECIFIED, UNSPECIFIED VOMITING TYPE: ICD-10-CM

## 2022-06-16 DIAGNOSIS — K43.9 VENTRAL HERNIA WITHOUT OBSTRUCTION OR GANGRENE: Primary | ICD-10-CM

## 2022-06-16 PROCEDURE — 3609012400 HC EGD TRANSORAL BIOPSY SINGLE/MULTIPLE: Performed by: INTERNAL MEDICINE

## 2022-06-16 PROCEDURE — 6360000002 HC RX W HCPCS: Performed by: NURSE ANESTHETIST, CERTIFIED REGISTERED

## 2022-06-16 PROCEDURE — 6370000000 HC RX 637 (ALT 250 FOR IP): Performed by: INTERNAL MEDICINE

## 2022-06-16 PROCEDURE — 2709999900 HC NON-CHARGEABLE SUPPLY: Performed by: INTERNAL MEDICINE

## 2022-06-16 PROCEDURE — 88305 TISSUE EXAM BY PATHOLOGIST: CPT

## 2022-06-16 PROCEDURE — 3609027000 HC COLONOSCOPY: Performed by: INTERNAL MEDICINE

## 2022-06-16 PROCEDURE — 7100000010 HC PHASE II RECOVERY - FIRST 15 MIN: Performed by: INTERNAL MEDICINE

## 2022-06-16 PROCEDURE — 3700000001 HC ADD 15 MINUTES (ANESTHESIA): Performed by: INTERNAL MEDICINE

## 2022-06-16 PROCEDURE — 2580000003 HC RX 258: Performed by: ANESTHESIOLOGY

## 2022-06-16 PROCEDURE — 7100000011 HC PHASE II RECOVERY - ADDTL 15 MIN: Performed by: INTERNAL MEDICINE

## 2022-06-16 PROCEDURE — 3700000000 HC ANESTHESIA ATTENDED CARE: Performed by: INTERNAL MEDICINE

## 2022-06-16 RX ORDER — PROPOFOL 10 MG/ML
INJECTION, EMULSION INTRAVENOUS CONTINUOUS PRN
Status: DISCONTINUED | OUTPATIENT
Start: 2022-06-16 | End: 2022-06-16 | Stop reason: SDUPTHER

## 2022-06-16 RX ORDER — PROPOFOL 10 MG/ML
INJECTION, EMULSION INTRAVENOUS PRN
Status: DISCONTINUED | OUTPATIENT
Start: 2022-06-16 | End: 2022-06-16 | Stop reason: SDUPTHER

## 2022-06-16 RX ORDER — SODIUM CHLORIDE 9 MG/ML
INJECTION, SOLUTION INTRAVENOUS CONTINUOUS
Status: DISCONTINUED | OUTPATIENT
Start: 2022-06-16 | End: 2022-06-16 | Stop reason: HOSPADM

## 2022-06-16 RX ADMIN — PROPOFOL 30 MG: 10 INJECTION, EMULSION INTRAVENOUS at 10:56

## 2022-06-16 RX ADMIN — PROPOFOL 30 MG: 10 INJECTION, EMULSION INTRAVENOUS at 11:05

## 2022-06-16 RX ADMIN — SODIUM CHLORIDE: 9 INJECTION, SOLUTION INTRAVENOUS at 10:11

## 2022-06-16 RX ADMIN — PROPOFOL 150 MCG/KG/MIN: 10 INJECTION, EMULSION INTRAVENOUS at 10:54

## 2022-06-16 RX ADMIN — PROPOFOL 70 MG: 10 INJECTION, EMULSION INTRAVENOUS at 10:54

## 2022-06-16 ASSESSMENT — PAIN - FUNCTIONAL ASSESSMENT: PAIN_FUNCTIONAL_ASSESSMENT: 0-10

## 2022-06-16 NOTE — PROCEDURES
EGD and Colonoscopy Procedure  Note            Patient: Marshall Mott  : 1955  CSN:     Procedure: Esophagogastroduodenoscopy with biopsy and incomplete colonoscopy     Date:  2022     Surgeon:  Chris Hebert MD     Referring Physician:  Alexander Oviedo MD    Preoperative Diagnosis: Intermittent nausea/vomiting/abdominal cramps and CT revealing enterocolitis    Postoperative Diagnosis:  -Normal EGD  -Incomplete colonoscopy limited by large ventral hernia. Anesthesia: Propofol sedation    EBL: <50 mL    Indications: This is a 74y.o. year old female who presents today  History for intermittent nausea and vomiting and enterocolitis on CT. Last colonoscopy more than 15 years back. Procedure Details:    With the patient in left lateral position the endoscope was passed through the hypopharynx into the esophagus. The scope was advanced all the way to the duodenum. The mucosa in the duodenal bulb, post bulbar region in the descending duodenum appeared normal.  The mucosa in the antrum appeared normal.  Biopsies from antrum were obtained. The mucosa in the remaining part of the stomach and retroflexion appeared normal.  The GE junction was at around 38 cms. the mucosa in the remainder of the esophagus appeared normal.  The scope was withdrawn and the procedure was terminated. Procedure Details:    With the patient in left lateral decubitus position the endoscope was inserted through the anorectal area into the rectum. The scope was then advanced through the descending/transverse colon. .  On advancement of the scope was limited by large ventral hernia. Multiple attempts were made by putting pressure on the hernia to see if we can advance the scope but was unsuccessful. At this point the scope was slowly withdrawn into the remaining part of the colon and the mucosa appeared normal.  On retroflexion no abnormalities were noted.     Scope was withdrawn and the procedure was terminated. Impression:   -Normal EGD  -Incomplete colonoscopy limited by large ventral hernia    Symptoms could be related to ventral hernia   Recommendations:   -We will refer to Dr. Lesli Greco to evaluation.         Kirsten Browning MD, MD   [facility]  6/16/2022

## 2022-06-16 NOTE — ANESTHESIA POSTPROCEDURE EVALUATION
Department of Anesthesiology  Postprocedure Note    Patient: Wm Hurtado  MRN: [id number]  YOB: 1955  Date of evaluation: 6/16/2022  Time:  11:41 AM     Procedure Summary     Date: 06/16/22 Room / Location: [address] [place] [address]    Anesthesia Start: 1050 Anesthesia Stop: 1120    Procedures:       EGD BIOPSY (N/A Abdomen)      COLONOSCOPY DIAGNOSTIC (N/A ) Diagnosis:       Nausea and vomiting, intractability of vomiting not specified, unspecified vomiting type      Enterocolitis      (Nausea and vomiting, intractability of vomiting not specified, unspecified vomiting type [R11.2])      (Enterocolitis [K52.9])    Surgeons: Krupa Guo MD Responsible Provider: Noemy England MD    Anesthesia Type: TIVA ASA Status: 3          Anesthesia Type: No value filed. Mere Phase I: Mere Score: 10    Mere Phase II:      Last vitals: Reviewed and per EMR flowsheets. Anesthesia Post Evaluation    Patient location during evaluation: PACU  Patient participation: complete - patient participated  Level of consciousness: awake  Airway patency: patent  Nausea & Vomiting: no vomiting  Complications: no  Cardiovascular status: hemodynamically stable  Respiratory status: acceptable  Hydration status: euvolemic  There was medical reason for not using a multimodal analgesia pain management approach.

## 2022-06-16 NOTE — ANESTHESIA PRE PROCEDURE
Department of Anesthesiology  Preprocedure Note       Name:  Musa Old   Age:  74 y.o.  :  1955                                          MRN:  [id number]         Date:  2022      Surgeon: Fausto Resendiz):  Mckayla Sood MD    Procedure: Procedure(s):  EGD DIAGNOSTIC ONLY  COLONOSCOPY DIAGNOSTIC    Medications prior to admission:   Prior to Admission medications    Medication Sig Start Date End Date Taking? Authorizing Provider   loratadine (CLARITIN) 10 MG capsule Take 10 mg by mouth as needed   Yes Historical Provider, MD   Ascorbic Acid (VITAMIN C PO) Take by mouth daily   Yes Historical Provider, MD   vitamin D 25 MCG (1000 UT) CAPS Take by mouth daily   Yes Historical Provider, MD   amLODIPine (NORVASC) 5 MG tablet Take 1 tablet by mouth daily 22   Thanh Chase MD   omeprazole 20 MG EC tablet Take 20 mg by mouth daily    Historical Provider, MD       Current medications:    Current Facility-Administered Medications   Medication Dose Route Frequency Provider Last Rate Last Admin    0.9 % sodium chloride infusion   IntraVENous Continuous Manohar MD Charles           Allergies: Allergies   Allergen Reactions    Penicillins Hives and Swelling    Rofecoxib Hives and Swelling    Sulfa Antibiotics Hives and Swelling       Problem List:    Patient Active Problem List   Diagnosis Code    Rheumatoid arthritis involving multiple sites ([facility] [address] M06.9    Skin lesion of face L98.9    Ventral hernia without obstruction or gangrene K43.9    BMI 40.0-44.9, adult (HCC) Z68.41    Mixed hyperlipidemia E78.2    Seasonal allergic rhinitis J30.2    Gastroesophageal reflux disease with esophagitis without hemorrhage K21.00    History of posttraumatic stress disorder (PTSD) Z86.59    Renal failure N19    Urticaria due to drug allergy L50.0, T50.905A    Cellulitis of right wrist L03. 113    Hypocalcemia E83.51    Salmonella food poisoning A02.9    Prediabetes R73.03    Endometrial thickening on ultrasound R93.89    Elevated blood-pressure reading, without diagnosis of hypertension R03.0    Intractable vomiting R11.10    SHEELA (acute kidney injury) ([facility] [address] N17.9    Primary hypertension I10    Abnormal mammogram of both breasts R92.8       Past Medical History:        Diagnosis Date    Food poisoning 04/2022    resulting in dehydration with SHEELA    Hypertension     PTSD (post-traumatic stress disorder)     Rheumatoid arthritis ([facility] [address]        Past Surgical History:        Procedure Laterality Date    ANTERIOR CRUCIATE LIGAMENT REPAIR      Left knee    KNEE CARTILAGE SURGERY      Right knee    TUBAL LIGATION Bilateral        Social History:    Social History     Tobacco Use    Smoking status: Never Smoker    Smokeless tobacco: Never Used   Substance Use Topics    Alcohol use: Yes     Comment: rarely                                Counseling given: Not Answered      Vital Signs (Current):   Vitals:    06/08/22 1354 06/16/22 0956   BP:  (!) 179/87   Pulse:  (!) 101   Resp:  16   Temp:  96.8 °F (36 °C)   TempSrc:  Temporal   Weight: 222 lb (100.7 kg) 222 lb (100.7 kg)   Height: 5' 3\" (1.6 m) 5' 3\" (1.6 m)                                              BP Readings from Last 3 Encounters:   06/16/22 (!) 179/87   06/07/22 (!) 152/100   04/28/22 (!) 168/107       NPO Status:                                                                                 BMI:   Wt Readings from Last 3 Encounters:   06/16/22 222 lb (100.7 kg)   06/07/22 224 lb (101.6 kg)   05/18/22 226 lb (102.5 kg)     Body mass index is 39.33 kg/m².     CBC:   Lab Results   Component Value Date    WBC 9.6 06/01/2022    RBC 4.60 06/01/2022    HGB 12.5 06/01/2022    HCT 38.4 06/01/2022    MCV 83.6 06/01/2022    RDW 16.2 06/01/2022     06/01/2022       CMP:   Lab Results   Component Value Date     06/01/2022    K 3.8 06/01/2022    K 3.9 04/07/2022     06/01/2022    CO2 21 06/01/2022    BUN 12 06/01/2022 CREATININE 1.0 06/01/2022    GFRAA >60 06/01/2022    AGRATIO 1.4 04/02/2022    LABGLOM 55 06/01/2022    GLUCOSE 113 06/01/2022    PROT 8.0 04/02/2022    CALCIUM 8.8 06/01/2022    BILITOT 0.6 04/02/2022    ALKPHOS 93 04/02/2022    AST 12 04/02/2022    ALT 20 04/02/2022       POC Tests: No results for input(s): POCGLU, POCNA, POCK, POCCL, POCBUN, POCHEMO, POCHCT in the last 72 hours. Coags: No results found for: PROTIME, INR, APTT    HCG (If Applicable): No results found for: PREGTESTUR, PREGSERUM, HCG, HCGQUANT     ABGs: No results found for: PHART, PO2ART, STF9VVP, LFW9KUW, BEART, I9SPASAP     Type & Screen (If Applicable):  No results found for: LABABO, LABRH    Drug/Infectious Status (If Applicable):  No results found for: HIV, HEPCAB    COVID-19 Screening (If Applicable):   Lab Results   Component Value Date    COVID19 Not Detected 04/02/2022           Anesthesia Evaluation  Patient summary reviewed and Nursing notes reviewed no history of anesthetic complications:   Airway: Mallampati: II  TM distance: >3 FB   Neck ROM: full  Mouth opening: > = 3 FB   Dental: normal exam         Pulmonary:       (-) COPD, asthma, rhonchi and wheezes                          ROS comment: JHON symptoms   Cardiovascular:    (+) hypertension:,     (-) CABG/stent, dysrhythmias and  angina      Rhythm: regular                      Neuro/Psych:   (+) psychiatric history (PTSD):   (-) seizures, TIA and CVA           GI/Hepatic/Renal:   (+) GERD:,          ROS comment: Patient denies n/v or gerd sx today  Has chronic nausea and vomiting. Endo/Other:                     Abdominal:             Vascular:     - DVT and PE. Other Findings:           Anesthesia Plan      TIVA     ASA 3       Induction: intravenous. Anesthetic plan and risks discussed with patient. Plan discussed with CRNA.                     Elmo Richmond MD   6/16/2022

## 2022-06-16 NOTE — H&P
Gastroenterology Note             Pre-operative History and Physical    Patient: Blanca Bey  : 1955  CSN:     History Obtained From:  patient and/or guardian. HISTORY OF PRESENT ILLNESS:    The patient is a 74 y.o. female  here for EGD/colonoscopy. History for intermittent nausea and vomiting and enterocolitis on CT. Last colonoscopy more than 15 years back. Past Medical History:    Past Medical History:   Diagnosis Date    Food poisoning 2022    resulting in dehydration with SHEELA    Hypertension     PTSD (post-traumatic stress disorder)     Rheumatoid arthritis ([facility] [address]      Past Surgical History:    Past Surgical History:   Procedure Laterality Date    ANTERIOR CRUCIATE LIGAMENT REPAIR      Left knee    KNEE CARTILAGE SURGERY      Right knee    TUBAL LIGATION Bilateral      Medications Prior to Admission:   No current facility-administered medications on file prior to encounter.      Current Outpatient Medications on File Prior to Encounter   Medication Sig Dispense Refill    loratadine (CLARITIN) 10 MG capsule Take 10 mg by mouth as needed      Ascorbic Acid (VITAMIN C PO) Take by mouth daily      vitamin D 25 MCG (1000 UT) CAPS Take by mouth daily      omeprazole 20 MG EC tablet Take 20 mg by mouth daily          Allergies:  Penicillins, Rofecoxib, and Sulfa antibiotics      Social History:   Social History     Tobacco Use    Smoking status: Never Smoker    Smokeless tobacco: Never Used   Substance Use Topics    Alcohol use: Yes     Comment: occasionally     Family History:   Family History   Problem Relation Age of Onset    Depression Mother     Other Mother     Mental Illness Mother     Stroke Mother     Breast Cancer Sister     Other Maternal Grandfather        PHYSICAL EXAM:      BP (!) 179/87   Pulse (!) 101   Temp 96.8 °F (36 °C) (Temporal)   Resp 16   Ht 5' 3\" (1.6 m)   Wt 222 lb (100.7 kg)   SpO2 97%   BMI 39.33 kg/m²  I        Heart:   RRR, normal s1s2    Lungs:  CTA bilat,  Normal effort    Abdomen:   NT, ND      ASA Grade:  ASA 3 - Patient with moderate systemic disease with functional limitations    Mallampati Class: 2          ASSESSMENT AND PLAN:    1. Patient is a 74 y.o. female here for EGD/Colonoscopy with MAC.   2.  Procedure options, risks and benefits reviewed with patient. Patient expresses understanding.     Lazarus Gutierrez MD,   [facility]  6/16/2022

## 2022-06-17 ENCOUNTER — TELEPHONE (OUTPATIENT)
Dept: INTERNAL MEDICINE CLINIC | Age: 67
End: 2022-06-17

## 2022-06-17 DIAGNOSIS — R92.8 ABNORMAL MAMMOGRAM OF RIGHT BREAST: Primary | ICD-10-CM

## 2022-06-17 NOTE — TELEPHONE ENCOUNTER
1 [facility] calling---they need  order put in epic for the pt---right Breast US guided biopsy----her appt is [facility] for Monday----Thanks.

## 2022-06-20 ENCOUNTER — HOSPITAL ENCOUNTER (OUTPATIENT)
Dept: WOMENS IMAGING | Age: 67
Discharge: HOME | End: 2022-06-20
Payer: MEDICARE

## 2022-06-20 ENCOUNTER — HOSPITAL ENCOUNTER (OUTPATIENT)
Dept: ULTRASOUND IMAGING | Age: 67
Discharge: HOME | End: 2022-06-20
Payer: MEDICARE

## 2022-06-20 DIAGNOSIS — R92.8 ABNORMAL MAMMOGRAM: ICD-10-CM

## 2022-06-20 DIAGNOSIS — R92.8 ABNORMAL MAMMOGRAM OF RIGHT BREAST: ICD-10-CM

## 2022-06-20 PROCEDURE — 88305 TISSUE EXAM BY PATHOLOGIST: CPT

## 2022-06-20 PROCEDURE — 77065 DX MAMMO INCL CAD UNI: CPT

## 2022-06-20 PROCEDURE — 88342 IMHCHEM/IMCYTCHM 1ST ANTB: CPT

## 2022-06-20 PROCEDURE — 2500000003 HC RX 250 WO HCPCS: Performed by: INTERNAL MEDICINE

## 2022-06-20 PROCEDURE — 2709999900 US BREAST BIOPSY W LOC DEVICE 1ST LESION RIGHT

## 2022-06-20 RX ORDER — LIDOCAINE HYDROCHLORIDE 10 MG/ML
5 INJECTION, SOLUTION EPIDURAL; INFILTRATION; INTRACAUDAL; PERINEURAL ONCE
Status: COMPLETED | OUTPATIENT
Start: 2022-06-20 | End: 2022-06-20

## 2022-06-20 RX ADMIN — LIDOCAINE HYDROCHLORIDE 5 ML: 10 INJECTION, SOLUTION EPIDURAL; INFILTRATION; INTRACAUDAL; PERINEURAL at 12:48

## 2022-06-20 RX ADMIN — LIDOCAINE HYDROCHLORIDE 10 ML: 10; .005 INJECTION, SOLUTION EPIDURAL; INFILTRATION; INTRACAUDAL; PERINEURAL at 13:15

## 2022-06-21 ENCOUNTER — TELEPHONE (OUTPATIENT)
Dept: WOMENS IMAGING | Age: 67
End: 2022-06-21

## 2022-06-21 NOTE — TELEPHONE ENCOUNTER
Imaging Navigator reviewed results of breast biopsy which showed breast tissue with dense fibrosis and focal sclerosing adenosis on the pathology report. Negative for atypia or malignancy. IN reviewed radiologist follow up recommendations as a right breast diagnostic mammogram and ultrasound in 6 months.

## 2022-06-28 ENCOUNTER — OFFICE VISIT (OUTPATIENT)
Dept: SURGERY | Age: 67
End: 2022-06-28
Payer: MEDICARE

## 2022-06-28 VITALS — BODY MASS INDEX: 39.33 KG/M2 | SYSTOLIC BLOOD PRESSURE: 152 MMHG | WEIGHT: 222 LBS | DIASTOLIC BLOOD PRESSURE: 100 MMHG

## 2022-06-28 DIAGNOSIS — K43.0 INCARCERATED INCISIONAL HERNIA: Primary | ICD-10-CM

## 2022-06-28 PROCEDURE — 1090F PRES/ABSN URINE INCON ASSESS: CPT | Performed by: SURGERY

## 2022-06-28 PROCEDURE — G8427 DOCREV CUR MEDS BY ELIG CLIN: HCPCS | Performed by: SURGERY

## 2022-06-28 PROCEDURE — 99203 OFFICE O/P NEW LOW 30 MIN: CPT | Performed by: SURGERY

## 2022-06-28 PROCEDURE — G8417 CALC BMI ABV UP PARAM F/U: HCPCS | Performed by: SURGERY

## 2022-06-28 ASSESSMENT — ENCOUNTER SYMPTOMS
RESPIRATORY NEGATIVE: 1
NAUSEA: 1
ABDOMINAL PAIN: 1
EYES NEGATIVE: 1
VOMITING: 1

## 2022-06-28 NOTE — PROGRESS NOTES
[facility]        PATIENT NAME: Graeme Win     TODAY'S DATE: 6/28/2022    Reason for Consult:  Hernia    Requesting Physician / PCP:  Dr. Rene Gutierrez / Dr. Kinsey Else:              The patient is a 74 y.o. female who presents with concerns of an abd bulge, has a mid abdominal area swelling / hernia. Notes enlargement and local pain in the area. Had a colonoscopy done, unable to complete to the right side due to bowel in the hernia. She has had symptoms for the past months. Associated symptoms are swelling more with strain and pressure. The patient has not had prior hernia surgery. She has had previous abdominal surgery. Had prior incision in the area for lap port for tubal ligation. Endometrial bx coming up next week. Past Medical History:        Diagnosis Date    Food poisoning 04/2022    resulting in dehydration with SHEELA    Hypertension     PTSD (post-traumatic stress disorder)     Rheumatoid arthritis ([facility] [address]        Past Surgical History:        Procedure Laterality Date    ANTERIOR CRUCIATE LIGAMENT REPAIR      Left knee    COLONOSCOPY N/A 6/16/2022    COLONOSCOPY DIAGNOSTIC performed by Armen Clifton MD at [place] [address]      Right knee    TUBAL LIGATION Bilateral     UPPER GASTROINTESTINAL ENDOSCOPY N/A 6/16/2022    EGD BIOPSY performed by Armen Clifton MD at [address] [place] [address] RIGHT Right 6/20/2022    US BREAST NEEDLE BIOPSY RIGHT 6/20/2022 [facility] ULTRASOUND       Current Medications:   No current facility-administered medications for this visit. Prior to Admission medications    Medication Sig Start Date End Date Taking?  Authorizing Provider   loratadine (CLARITIN) 10 MG capsule Take 10 mg by mouth as needed   Yes Historical Provider, MD   Ascorbic Acid (VITAMIN C PO) Take by mouth daily   Yes Historical Provider, MD   vitamin D 25 MCG (1000 UT) CAPS Take by mouth daily   Yes Historical Provider, MD   amLODIPine (NORVASC) 5 MG tablet Take 1 tablet by mouth daily 6/7/22  Yes Luz Marina Marie MD   omeprazole 20 MG EC tablet Take 20 mg by mouth daily   Yes Historical Provider, MD        Allergies:  Penicillins, Rofecoxib, and Sulfa antibiotics    Social History:    reports that she has never smoked. She has never used smokeless tobacco. She reports current alcohol use. She reports that she does not use drugs. Family History:        Problem Relation Age of Onset   Renny Spina Depression Mother     Other Mother     Mental Illness Mother     Stroke Mother     Breast Cancer Sister     Other Maternal Grandfather        REVIEW OF SYSTEMS:  Review of Systems   Constitutional: Positive for appetite change and unexpected weight change. HENT: Negative. Eyes: Negative. Respiratory: Negative. Cardiovascular: Negative. Gastrointestinal: Positive for abdominal pain, nausea and vomiting. Endocrine: Negative. Genitourinary: Negative. Musculoskeletal: Negative. Skin: Negative. Allergic/Immunologic: Positive for environmental allergies and immunocompromised state. Neurological: Positive for dizziness and light-headedness. Hematological: Negative. Psychiatric/Behavioral: Negative.         PHYSICAL EXAM:  VITALS:  Wt 222 lb (100.7 kg)   BMI 39.33 kg/m²   CONSTITUTIONAL:  alert, no apparent distress and moderately obese  EYES:  sclera clear  ENT:  normocepalic, without obvious abnormality  NECK:  supple, symmetrical, trachea midline  LUNGS:  clear to auscultation  CARDIOVASCULAR:  regular rate and rhythm  ABDOMEN:  scars noted above umbilical region, normal bowel sounds, soft, non-distended, tenderness noted at hernia in mid upper abdomen, voluntary guarding absent, no masses palpated, no hepatosplenomegally and hernia present - midline, incisional  MUSCULOSKELETAL:  0+ edema lower extremities  NEUROLOGIC:  Mental Status Exam:  Level of Alertness: awake  Orientation:   person, place, time  SKIN:  no bruising or bleeding, normal skin color, texture, turgor and no redness, warmth, or swelling    DATA:    Results for Olvin Damon (MRN [id number]) as of 6/28/2022 13:23   Ref. Range 6/1/2022 10:29 6/1/2022 10:30   Sodium Latest Ref Range: 136 - 145 mmol/L 139    Potassium Latest Ref Range: 3.5 - 5.1 mmol/L 3.8    Chloride Latest Ref Range: 99 - 110 mmol/L 102    CO2 Latest Ref Range: 21 - 32 mmol/L 21    BUN,BUNPL Latest Ref Range: 7 - 20 mg/dL 12    Creatinine Latest Ref Range: 0.6 - 1.2 mg/dL 1.0    Anion Gap Latest Ref Range: 3 - 16  16    GFR Non- Latest Ref Range: >60  55 (A)    GFR  Latest Ref Range: >60  >60    Protein/Creat Ratio Latest Units: mg/dL  0.3   GLUCOSE, FASTING,GF Latest Ref Range: 70 - 99 mg/dL 113 (H)    CALCIUM, SERUM, 640208 Latest Ref Range: 8.3 - 10.6 mg/dL 8.8    Phosphorus Latest Ref Range: 2.5 - 4.9 mg/dL 2.7    Albumin Latest Ref Range: 3.4 - 5.0 g/dL 4.2    WBC Latest Ref Range: 4.0 - 11.0 K/uL 9.6    RBC Latest Ref Range: 4.00 - 5.20 M/uL 4.60    Hemoglobin Quant Latest Ref Range: 12.0 - 16.0 g/dL 12.5    Hematocrit Latest Ref Range: 36.0 - 48.0 % 38.4    MCV Latest Ref Range: 80.0 - 100.0 fL 83.6    MCH Latest Ref Range: 26.0 - 34.0 pg 27.3    MCHC Latest Ref Range: 31.0 - 36.0 g/dL 32.7    MPV Latest Ref Range: 5.0 - 10.5 fL 8.0    RDW Latest Ref Range: 12.4 - 15.4 % 16.2 (H)    Platelet Count Latest Ref Range: 135 - 450 K/uL 339    Protein, Ur Latest Ref Range: <12 mg/dL  31.00 (H)   Creatinine, Ur Latest Ref Range: 28.0 - 259.0 mg/dL  123.7     Radiology Review:    EXAMINATION:   CT OF THE ABDOMEN AND PELVIS WITH CONTRAST 4/2/2022 10:57 am       TECHNIQUE:   CT of the abdomen and pelvis was performed with the administration of   intravenous contrast. Multiplanar reformatted images are provided for review.    Dose modulation, iterative reconstruction, and/or weight based adjustment of   the mA/kV was utilized to reduce the radiation dose to as low as reasonably   achievable.       COMPARISON:   None.       HISTORY:   ORDERING SYSTEM PROVIDED HISTORY: Abdominal cramping   TECHNOLOGIST PROVIDED HISTORY:   Additional Contrast?->None   Reason for exam:->Abdominal cramping   Decision Support Exception - unselect if not a suspected or confirmed   emergency medical condition->Emergency Medical Condition (MA)   Reason for Exam: emesis and abdominal cramping       FINDINGS:   Lower Chest: Unremarkable       Organs: Liver is normal in contour and enhancement.  Gallbladder is   unremarkable without biliary ductal dilatation.  Pancreas is unremarkable. Adrenals are unremarkable.  Spleen is normal in size.  No renal calculi or   hydronephrosis.  Vasculature is unremarkable.       GI/Bowel: Borderline wall thickening of the distal ileum entirety of the   colon with mucosal hyperemia and liquid stool.  Appendix is normal.       Pelvis: Abnormal thickening of the endometrium to 1.3 cm.       Peritoneum/Retroperitoneum:No free fluid, free air, organized fluid   collection or lymphadenopathy.       Bones: Multilevel degenerative disc disease.  Large bowel containing ventral   hernia without evidence of obstruction or strangulation           Impression   1. Mild infectious or inflammatory enterocolitis to include inflammatory   bowel disease. 2. Abnormal endometrial thickening to 1.3 cm.  Recommend further evaluation   with nonemergent pelvic ultrasound.           IMPRESSION/RECOMMENDATIONS:    Ventral Incisional hernia with incarceration. Laparoscopic ventral incisional hernia repair with mesh will be scheduled. The plan for surgery has been reviewed in detail today. Risks and benefits have been reviewed, and all questions answered.      Schedule as outpt at [facility], can do follow up colonoscopy thereafter    Thank you,      Elaina Barber MD

## 2022-07-01 RX ORDER — HYDROCODONE BITARTRATE AND ACETAMINOPHEN 5; 325 MG/1; MG/1
1 TABLET ORAL EVERY 6 HOURS PRN
Status: ON HOLD | COMMUNITY
End: 2022-07-11

## 2022-07-01 NOTE — PROGRESS NOTES
Name_______________________________________Printed:____________________  Date and time of surgery___7/11/22 [address] [place] [address] _____________________Arrival Time:_0845_______________   1. The instructions given regarding when and if a patient needs to stop oral intake prior to surgery varies. Follow the specific instructions you were given                  _x__Nothing to eat or to drink after Midnight the night before.                   ____Carbo loading or ERAS instructions will be given to select patients-if you have been given those instructions -please do the following                           The evening before your surgery after dinner before midnight drink 40 ounces of gatorade. If you are diabetic use sugar free. The morning of surgery drink 40 ounces of water. This needs to be finished 3 hours prior to your surgery start time. 2. Take the following pills with a small sip of water on the morning of surgery___________________________________________________                  Do not take blood pressure medications ending in pril or sartan the sree prior to surgery or the morning of surgery_   3. Aspirin, Ibuprofen, Advil, Naproxen, Vitamin E and other Anti-inflammatory products and supplements should be stopped for 5 -7days before surgery or as directed by your physician. 4. Check with your Doctor regarding stopping Plavix, Coumadin,Eliquis, Lovenox,Effient,Pradaxa,Xarelto, Fragmin or other blood thinners and follow their instructions. 5. Do not smoke, and do not drink any alcoholic beverages 24 hours prior to surgery. This includes NA Beer. Refrain from the usage of any recreational drugs. 6. You may brush your teeth and gargle the morning of surgery. DO NOT SWALLOW WATER   7. You MUST make arrangements for a responsible adult to stay on site while you are here and take you home after your surgery. You will not be allowed to leave alone or drive yourself home.   It is strongly suggested someone stay with you the first 24 hrs. Your surgery will be cancelled if you do not have a ride home. 8. A parent/legal guardian must accompany a child scheduled for surgery and plan to stay at the hospital until the child is discharged. Please do not bring other children with you. 9. Please wear simple, loose fitting clothing to the hospital.  Sonny Vieyra not bring valuables (money, credit cards, checkbooks, etc.) Do not wear any makeup (including no eye makeup) or nail polish on your fingers or toes. 10. DO NOT wear any jewelry or piercings on day of surgery. All body piercing jewelry must be removed. 11. If you have ___dentures, they will be removed before going to the OR; we will provide you a container. If you wear ___contact lenses or ___glasses, they will be removed; please bring a case for them. 12. Please see your family doctor/pediatrician for a history & physical and/or concerning medications. Bring any test results/reports from your physician's office. PCP__________________Phone___________H&P Appt. Date________             13 If you  have a Living Will and Durable Power of  for Healthcare, please bring in a copy. 15. Notify your Surgeon if you develop any illness between now and surgery  time, cough, cold, fever, sore throat, nausea, vomiting, etc.  Please notify your surgeon if you experience dizziness, shortness of breath or blurred vision between now & the time of your surgery             15. DO NOT shave your operative site 96 hours prior to surgery. For face & neck surgery, men may use an electric razor 48 hours prior to surgery. 16. Shower the night before or morning of surgery using an antibacterial soap or as you have been instructed. 17. To provide excellent care visitors will be limited to one in the room at any given time. 18.  Please bring picture ID and insurance card.              19.  Visit our web site for additional information:  [email or web address]              20.During flu season no children under the age of 15 are permitted in the hospital for the safety of all patients. 21. If you take a long acting insulin in the evening only  take half of your usual  dose the night  before your procedure              22. If you use a c-pap please bring DOS if staying overnight,             23.For your convenience [facility] has a pharmacy on site to fill your prescriptions. 24. If you use oxygen and have a portable tank please bring it  with you the DOS             25. Bring a complete list of all your medications with name and dose include any supplements. 26. Other__________________________________________   *Please call pre admission testing if you any further questions   Hailey Corral         [address]    [facility] [address]  [phone]   [address] [place] [address]       VISITOR POLICY(subject to change)    Current policy is 2 visitors per patient. No children. A mask is required. Visiting hours are 8a-8p. Overnight visitors will be at the discretion of the nurse. All above information reviewed with patient in person or by phone. Patient verbalizes understanding. All questions and concerns addressed.                                                                                                  Patient/Rep__patient__________________                                                                                                                                    PRE OP INSTRUCTIONS

## 2022-07-11 ENCOUNTER — ANESTHESIA EVENT (OUTPATIENT)
Dept: OPERATING ROOM | Age: 67
End: 2022-07-11
Payer: MEDICARE

## 2022-07-11 ENCOUNTER — HOSPITAL ENCOUNTER (OUTPATIENT)
Age: 67
Setting detail: OUTPATIENT SURGERY
Discharge: HOME | End: 2022-07-11
Attending: SURGERY | Admitting: SURGERY
Payer: MEDICARE

## 2022-07-11 ENCOUNTER — ANESTHESIA (OUTPATIENT)
Dept: OPERATING ROOM | Age: 67
End: 2022-07-11
Payer: MEDICARE

## 2022-07-11 VITALS
WEIGHT: 220 LBS | HEIGHT: 63 IN | TEMPERATURE: 97.2 F | SYSTOLIC BLOOD PRESSURE: 134 MMHG | OXYGEN SATURATION: 92 % | RESPIRATION RATE: 12 BRPM | DIASTOLIC BLOOD PRESSURE: 81 MMHG | BODY MASS INDEX: 38.98 KG/M2 | HEART RATE: 79 BPM

## 2022-07-11 DIAGNOSIS — K43.0 INCARCERATED INCISIONAL HERNIA: ICD-10-CM

## 2022-07-11 PROCEDURE — 7100000010 HC PHASE II RECOVERY - FIRST 15 MIN: Performed by: SURGERY

## 2022-07-11 PROCEDURE — 7100000001 HC PACU RECOVERY - ADDTL 15 MIN: Performed by: SURGERY

## 2022-07-11 PROCEDURE — 6360000002 HC RX W HCPCS: Performed by: NURSE ANESTHETIST, CERTIFIED REGISTERED

## 2022-07-11 PROCEDURE — 2580000003 HC RX 258: Performed by: SURGERY

## 2022-07-11 PROCEDURE — 6360000002 HC RX W HCPCS: Performed by: SURGERY

## 2022-07-11 PROCEDURE — C1781 MESH (IMPLANTABLE): HCPCS | Performed by: SURGERY

## 2022-07-11 PROCEDURE — 88302 TISSUE EXAM BY PATHOLOGIST: CPT

## 2022-07-11 PROCEDURE — 3700000001 HC ADD 15 MINUTES (ANESTHESIA): Performed by: SURGERY

## 2022-07-11 PROCEDURE — C9290 INJ, BUPIVACAINE LIPOSOME: HCPCS | Performed by: SURGERY

## 2022-07-11 PROCEDURE — 2580000003 HC RX 258: Performed by: ANESTHESIOLOGY

## 2022-07-11 PROCEDURE — 2720000010 HC SURG SUPPLY STERILE: Performed by: SURGERY

## 2022-07-11 PROCEDURE — 3600000004 HC SURGERY LEVEL 4 BASE: Performed by: SURGERY

## 2022-07-11 PROCEDURE — 49655 PR LAP, INCISIONAL HERNIA REPAIR,INCARCERATED: CPT | Performed by: SURGERY

## 2022-07-11 PROCEDURE — 6370000000 HC RX 637 (ALT 250 FOR IP): Performed by: ANESTHESIOLOGY

## 2022-07-11 PROCEDURE — 2500000003 HC RX 250 WO HCPCS: Performed by: NURSE ANESTHETIST, CERTIFIED REGISTERED

## 2022-07-11 PROCEDURE — 6360000002 HC RX W HCPCS: Performed by: ANESTHESIOLOGY

## 2022-07-11 PROCEDURE — 3600000014 HC SURGERY LEVEL 4 ADDTL 15MIN: Performed by: SURGERY

## 2022-07-11 PROCEDURE — 6360000002 HC RX W HCPCS

## 2022-07-11 PROCEDURE — 2500000003 HC RX 250 WO HCPCS: Performed by: SURGERY

## 2022-07-11 PROCEDURE — 3700000000 HC ANESTHESIA ATTENDED CARE: Performed by: SURGERY

## 2022-07-11 PROCEDURE — 7100000011 HC PHASE II RECOVERY - ADDTL 15 MIN: Performed by: SURGERY

## 2022-07-11 PROCEDURE — 7100000000 HC PACU RECOVERY - FIRST 15 MIN: Performed by: SURGERY

## 2022-07-11 PROCEDURE — 2709999900 HC NON-CHARGEABLE SUPPLY: Performed by: SURGERY

## 2022-07-11 DEVICE — MESH HERN W6XL8IN ELLIPSE W/ ECHO PS POS SYS VENTRALIGHT ST: Type: IMPLANTABLE DEVICE | Site: ABDOMEN | Status: FUNCTIONAL

## 2022-07-11 RX ORDER — DEXAMETHASONE SODIUM PHOSPHATE 4 MG/ML
INJECTION, SOLUTION INTRA-ARTICULAR; INTRALESIONAL; INTRAMUSCULAR; INTRAVENOUS; SOFT TISSUE PRN
Status: DISCONTINUED | OUTPATIENT
Start: 2022-07-11 | End: 2022-07-11 | Stop reason: SDUPTHER

## 2022-07-11 RX ORDER — LABETALOL HYDROCHLORIDE 5 MG/ML
10 INJECTION, SOLUTION INTRAVENOUS
Status: DISCONTINUED | OUTPATIENT
Start: 2022-07-11 | End: 2022-07-11 | Stop reason: HOSPADM

## 2022-07-11 RX ORDER — PROPOFOL 10 MG/ML
INJECTION, EMULSION INTRAVENOUS PRN
Status: DISCONTINUED | OUTPATIENT
Start: 2022-07-11 | End: 2022-07-11 | Stop reason: SDUPTHER

## 2022-07-11 RX ORDER — SODIUM CHLORIDE, SODIUM LACTATE, POTASSIUM CHLORIDE, CALCIUM CHLORIDE 600; 310; 30; 20 MG/100ML; MG/100ML; MG/100ML; MG/100ML
INJECTION, SOLUTION INTRAVENOUS CONTINUOUS
Status: DISCONTINUED | OUTPATIENT
Start: 2022-07-11 | End: 2022-07-11 | Stop reason: HOSPADM

## 2022-07-11 RX ORDER — PROCHLORPERAZINE EDISYLATE 5 MG/ML
5 INJECTION INTRAMUSCULAR; INTRAVENOUS
Status: DISCONTINUED | OUTPATIENT
Start: 2022-07-11 | End: 2022-07-11 | Stop reason: HOSPADM

## 2022-07-11 RX ORDER — HYDRALAZINE HYDROCHLORIDE 20 MG/ML
10 INJECTION INTRAMUSCULAR; INTRAVENOUS
Status: DISCONTINUED | OUTPATIENT
Start: 2022-07-11 | End: 2022-07-11 | Stop reason: HOSPADM

## 2022-07-11 RX ORDER — KETOROLAC TROMETHAMINE 30 MG/ML
15 INJECTION, SOLUTION INTRAMUSCULAR; INTRAVENOUS ONCE
Status: COMPLETED | OUTPATIENT
Start: 2022-07-11 | End: 2022-07-11

## 2022-07-11 RX ORDER — OXYCODONE HYDROCHLORIDE 5 MG/1
5 TABLET ORAL
Status: COMPLETED | OUTPATIENT
Start: 2022-07-11 | End: 2022-07-11

## 2022-07-11 RX ORDER — HYDROMORPHONE HCL 110MG/55ML
0.5 PATIENT CONTROLLED ANALGESIA SYRINGE INTRAVENOUS EVERY 5 MIN PRN
Status: COMPLETED | OUTPATIENT
Start: 2022-07-11 | End: 2022-07-11

## 2022-07-11 RX ORDER — ONDANSETRON 2 MG/ML
4 INJECTION INTRAMUSCULAR; INTRAVENOUS
Status: DISCONTINUED | OUTPATIENT
Start: 2022-07-11 | End: 2022-07-11 | Stop reason: HOSPADM

## 2022-07-11 RX ORDER — DOCUSATE SODIUM 100 MG/1
100 CAPSULE, LIQUID FILLED ORAL 2 TIMES DAILY
Qty: 30 CAPSULE | Refills: 0 | Status: SHIPPED | OUTPATIENT
Start: 2022-07-11 | End: 2022-08-04

## 2022-07-11 RX ORDER — FENTANYL CITRATE 50 UG/ML
25 INJECTION, SOLUTION INTRAMUSCULAR; INTRAVENOUS EVERY 5 MIN PRN
Status: DISCONTINUED | OUTPATIENT
Start: 2022-07-11 | End: 2022-07-11 | Stop reason: HOSPADM

## 2022-07-11 RX ORDER — LIDOCAINE HYDROCHLORIDE 20 MG/ML
INJECTION, SOLUTION EPIDURAL; INFILTRATION; INTRACAUDAL; PERINEURAL PRN
Status: DISCONTINUED | OUTPATIENT
Start: 2022-07-11 | End: 2022-07-11 | Stop reason: SDUPTHER

## 2022-07-11 RX ORDER — HYDROMORPHONE HCL 110MG/55ML
0.5 PATIENT CONTROLLED ANALGESIA SYRINGE INTRAVENOUS EVERY 5 MIN PRN
Status: DISCONTINUED | OUTPATIENT
Start: 2022-07-11 | End: 2022-07-11 | Stop reason: HOSPADM

## 2022-07-11 RX ORDER — FENTANYL CITRATE 50 UG/ML
INJECTION, SOLUTION INTRAMUSCULAR; INTRAVENOUS
Status: COMPLETED
Start: 2022-07-11 | End: 2022-07-11

## 2022-07-11 RX ORDER — ROCURONIUM BROMIDE 10 MG/ML
INJECTION, SOLUTION INTRAVENOUS PRN
Status: DISCONTINUED | OUTPATIENT
Start: 2022-07-11 | End: 2022-07-11 | Stop reason: SDUPTHER

## 2022-07-11 RX ORDER — BUPIVACAINE HYDROCHLORIDE 5 MG/ML
INJECTION, SOLUTION EPIDURAL; INTRACAUDAL
Status: COMPLETED | OUTPATIENT
Start: 2022-07-11 | End: 2022-07-11

## 2022-07-11 RX ORDER — LABETALOL HYDROCHLORIDE 5 MG/ML
INJECTION, SOLUTION INTRAVENOUS PRN
Status: DISCONTINUED | OUTPATIENT
Start: 2022-07-11 | End: 2022-07-11 | Stop reason: SDUPTHER

## 2022-07-11 RX ORDER — ONDANSETRON 2 MG/ML
INJECTION INTRAMUSCULAR; INTRAVENOUS PRN
Status: DISCONTINUED | OUTPATIENT
Start: 2022-07-11 | End: 2022-07-11 | Stop reason: SDUPTHER

## 2022-07-11 RX ORDER — ACETAMINOPHEN 325 MG/1
650 TABLET ORAL EVERY 4 HOURS PRN
Status: DISCONTINUED | OUTPATIENT
Start: 2022-07-11 | End: 2022-07-11 | Stop reason: HOSPADM

## 2022-07-11 RX ORDER — HYDROCODONE BITARTRATE AND ACETAMINOPHEN 5; 325 MG/1; MG/1
1 TABLET ORAL EVERY 4 HOURS PRN
Qty: 25 TABLET | Refills: 0 | Status: SHIPPED | OUTPATIENT
Start: 2022-07-11 | End: 2022-07-18

## 2022-07-11 RX ORDER — LIDOCAINE HYDROCHLORIDE 10 MG/ML
1 INJECTION, SOLUTION EPIDURAL; INFILTRATION; INTRACAUDAL; PERINEURAL
Status: DISCONTINUED | OUTPATIENT
Start: 2022-07-11 | End: 2022-07-11 | Stop reason: HOSPADM

## 2022-07-11 RX ORDER — FENTANYL CITRATE 50 UG/ML
INJECTION, SOLUTION INTRAMUSCULAR; INTRAVENOUS PRN
Status: DISCONTINUED | OUTPATIENT
Start: 2022-07-11 | End: 2022-07-11 | Stop reason: SDUPTHER

## 2022-07-11 RX ADMIN — ACETAMINOPHEN 325MG 650 MG: 325 TABLET ORAL at 13:38

## 2022-07-11 RX ADMIN — ROCURONIUM BROMIDE 5 MG: 10 INJECTION, SOLUTION INTRAVENOUS at 11:25

## 2022-07-11 RX ADMIN — FENTANYL CITRATE 25 MCG: 50 INJECTION, SOLUTION INTRAMUSCULAR; INTRAVENOUS at 14:26

## 2022-07-11 RX ADMIN — FENTANYL CITRATE 25 MCG: 50 INJECTION, SOLUTION INTRAMUSCULAR; INTRAVENOUS at 14:31

## 2022-07-11 RX ADMIN — SUGAMMADEX 200 MG: 100 INJECTION, SOLUTION INTRAVENOUS at 11:44

## 2022-07-11 RX ADMIN — ROCURONIUM BROMIDE 40 MG: 10 INJECTION, SOLUTION INTRAVENOUS at 10:31

## 2022-07-11 RX ADMIN — HYDROMORPHONE HYDROCHLORIDE 0.5 MG: 2 INJECTION, SOLUTION INTRAMUSCULAR; INTRAVENOUS; SUBCUTANEOUS at 12:01

## 2022-07-11 RX ADMIN — ONDANSETRON 4 MG: 2 INJECTION INTRAMUSCULAR; INTRAVENOUS at 11:35

## 2022-07-11 RX ADMIN — HYDROMORPHONE HYDROCHLORIDE 0.5 MG: 2 INJECTION, SOLUTION INTRAMUSCULAR; INTRAVENOUS; SUBCUTANEOUS at 12:25

## 2022-07-11 RX ADMIN — FENTANYL CITRATE 100 MCG: 50 INJECTION, SOLUTION INTRAMUSCULAR; INTRAVENOUS at 10:29

## 2022-07-11 RX ADMIN — HYDROMORPHONE HYDROCHLORIDE 0.5 MG: 2 INJECTION, SOLUTION INTRAMUSCULAR; INTRAVENOUS; SUBCUTANEOUS at 12:19

## 2022-07-11 RX ADMIN — DEXAMETHASONE SODIUM PHOSPHATE 8 MG: 4 INJECTION, SOLUTION INTRAMUSCULAR; INTRAVENOUS at 10:43

## 2022-07-11 RX ADMIN — PROPOFOL 140 MG: 10 INJECTION, EMULSION INTRAVENOUS at 10:31

## 2022-07-11 RX ADMIN — HYDROMORPHONE HYDROCHLORIDE 0.5 MG: 2 INJECTION, SOLUTION INTRAMUSCULAR; INTRAVENOUS; SUBCUTANEOUS at 12:09

## 2022-07-11 RX ADMIN — SODIUM CHLORIDE, POTASSIUM CHLORIDE, SODIUM LACTATE AND CALCIUM CHLORIDE: 600; 310; 30; 20 INJECTION, SOLUTION INTRAVENOUS at 09:27

## 2022-07-11 RX ADMIN — KETOROLAC TROMETHAMINE 15 MG: 30 INJECTION, SOLUTION INTRAMUSCULAR at 14:00

## 2022-07-11 RX ADMIN — KETOROLAC TROMETHAMINE 15 MG: 30 INJECTION, SOLUTION INTRAMUSCULAR at 14:46

## 2022-07-11 RX ADMIN — LIDOCAINE HYDROCHLORIDE 80 MG: 20 INJECTION, SOLUTION EPIDURAL; INFILTRATION; INTRACAUDAL; PERINEURAL at 10:31

## 2022-07-11 RX ADMIN — LABETALOL HYDROCHLORIDE 10 MG: 5 INJECTION INTRAVENOUS at 10:35

## 2022-07-11 RX ADMIN — OXYCODONE 5 MG: 5 TABLET ORAL at 12:41

## 2022-07-11 RX ADMIN — CEFAZOLIN 2000 MG: 2 INJECTION, POWDER, FOR SOLUTION INTRAMUSCULAR; INTRAVENOUS at 10:14

## 2022-07-11 ASSESSMENT — PAIN DESCRIPTION - PAIN TYPE
TYPE: SURGICAL PAIN

## 2022-07-11 ASSESSMENT — PAIN DESCRIPTION - LOCATION
LOCATION: ABDOMEN

## 2022-07-11 ASSESSMENT — PAIN SCALES - GENERAL
PAINLEVEL_OUTOF10: 7
PAINLEVEL_OUTOF10: 8
PAINLEVEL_OUTOF10: 8
PAINLEVEL_OUTOF10: 7
PAINLEVEL_OUTOF10: 8
PAINLEVEL_OUTOF10: 5
PAINLEVEL_OUTOF10: 8
PAINLEVEL_OUTOF10: 7
PAINLEVEL_OUTOF10: 8

## 2022-07-11 ASSESSMENT — PAIN DESCRIPTION - ONSET
ONSET: ON-GOING

## 2022-07-11 ASSESSMENT — PAIN DESCRIPTION - FREQUENCY
FREQUENCY: CONTINUOUS

## 2022-07-11 ASSESSMENT — ENCOUNTER SYMPTOMS: SHORTNESS OF BREATH: 0

## 2022-07-11 ASSESSMENT — PAIN DESCRIPTION - ORIENTATION
ORIENTATION: MID;RIGHT;LEFT
ORIENTATION: MID;RIGHT;LEFT

## 2022-07-11 ASSESSMENT — PAIN DESCRIPTION - DESCRIPTORS
DESCRIPTORS: SORE
DESCRIPTORS: ACHING;SQUEEZING
DESCRIPTORS: SORE

## 2022-07-11 ASSESSMENT — PAIN - FUNCTIONAL ASSESSMENT: PAIN_FUNCTIONAL_ASSESSMENT: 0-10

## 2022-07-11 NOTE — PROGRESS NOTES
Discharge instructions review with patient and  Michael Collier. All home medications have been reviewed, pt v/u. Discharge instructions signed. Pt discharged via wheelchair. Pt discharged with 1 RX, abd binder and all belongings.  taking stable pt home.

## 2022-07-11 NOTE — OP NOTE
[place] [id number]                     [address] [facility], [address] [place] [address]                                OPERATIVE REPORT    PATIENT NAME: Irma Prader                  :        1955  MED REC NO:   [id number]                          ROOM:  ACCOUNT NO:   [de-identified]                           ADMIT DATE: 2022  PROVIDER:     Jenni Yang MD    DATE OF PROCEDURE:  2022    PREOPERATIVE DIAGNOSIS:  Incarcerated incisional hernia. POSTOPERATIVE DIAGNOSIS:  Incarcerated incisional hernia. PROCEDURE:  Laparoscopic repair of incarcerated incisional hernia with  mesh. SURGEON:  Jenni Yang MD.    ANESTHESIA:  General plus local.    ESTIMATED BLOOD LOSS:  Minimal.    COMPLICATIONS:  None. INDICATIONS FOR SURGERY:  The patient is a 75-year-old female presenting  with a tender enlarging incisional hernia. The patient has omentum and  transverse colon incarcerated up in the hernia. This was noted at recent  colonoscopy with inability to pass the scope past this point. She  presents for definitive repair today. ADDITIONAL DETAILS OF SURGERY:  The patient was brought to the operating  room and placed on the operative table in supine position. Compression  boots were placed. General anesthetic was administered. The abdomen  was prepped and draped sterilely and time-out was done. A left upper quadrant 5-mm direct entry view port was placed and the  abdominal cavity was insufflated to 15 mmHg pressure. Two additional  5-mm ports were placed in the lower midline on the left side. Dissection was carried out. There was a large amount of lobulated  omental fat and the transverse colon stuck up in the hernia. This was  gradually and progressively reduced carefully avoiding any injury to the  bowel or surrounding fatty tissue. The hernia sac was then excised. A  12-mm port was placed centrally in the area of the defect.   The hernia  sac was taken down and sent to Pathology. The hernia measured about 4 x  4 cm in size. A 15 x 20 cm Ventralight ST mesh was used for the repair. This was then placed in the abdomen through the 12-mm port. The fascia  at the hernia site was then closed with interrupted 0 Vicryl ties placed  with the suture passer doing a primary repair of the defect. The mesh  was then brought up centrally in the defect, positioned and then tacked  in position with the SECURESTRAP Absorbable tacker with complete outer  row and second complete inner row. The green balloon system holding the  mesh in place was removed from the abdomen. All surgical sites were  checked and all appeared hemostatic. The hernia repair appeared strong  and intact. The instruments and ports were removed. The port sites  were closed with 4-0 Monocryl in the skin and Dermabond glue. The  patient was then taken to the recovery room in stable condition postop.         Deny Arboleda MD    D: 07/11/2022 11:54:20       T: 07/11/2022 11:57:33     [email or web address]  Job#: [id number]     Doc#: [id number]    CC:  [id number]

## 2022-07-11 NOTE — ANESTHESIA POSTPROCEDURE EVALUATION
Department of Anesthesiology  Postprocedure Note    Patient: Sukhjinder Meza  MRN: [id number]  YOB: 1955  Date of evaluation: 7/11/2022      Procedure Summary     Date: 07/11/22 Room / Location: [address]    Anesthesia Start: 1020 Anesthesia Stop: 6792    Procedure: LAPAROSCOPIC REPAIR OF INCARCERATED INCISIONAL HERNIA (N/A Abdomen) Diagnosis:       Incarcerated incisional hernia      (Incarcerated incisional hernia [K43.0])    Surgeons: Katie Aponte MD Responsible Provider: Desirae Mathis MD    Anesthesia Type: general ASA Status: 3          Anesthesia Type: No value filed.     Mere Phase I: Mere Score: 8    Mere Phase II:        Anesthesia Post Evaluation    Patient location during evaluation: PACU  Patient participation: complete - patient participated  Level of consciousness: awake and alert  Airway patency: patent  Nausea & Vomiting: no nausea and no vomiting  Complications: no  Cardiovascular status: hemodynamically stable  Respiratory status: acceptable  Hydration status: stable  Multimodal analgesia pain management approach

## 2022-07-11 NOTE — H&P
[facility]      I have reviewed the history and physical and examined the patient and find no relevant changes. I have reviewed with the patient and/or family the risks, benefits, and alternatives to the procedure.     Mic Alfaro MD  7/11/2022

## 2022-07-11 NOTE — PROGRESS NOTES
Pt awake and alert at this time. Pt on RA, and VSS. Pt denies nausea and pain being managed, tolerating PO. Pt meets criteria to be discharged from Phase 1.

## 2022-07-11 NOTE — ANESTHESIA PRE PROCEDURE
Department of Anesthesiology  Preprocedure Note       Name:  Hina Haas   Age:  74 y.o.  :  1955                                          MRN:  [id number]         Date:  2022      Surgeon: Lord Quan):  Mic Alfaro MD    Procedure: Procedure(s):  LAPAROSCOPIC REPAIR OF INCARCERATED INCISIONAL HERNIA    Medications prior to admission:   Prior to Admission medications    Medication Sig Start Date End Date Taking? Authorizing Provider   HYDROcodone-acetaminophen (NORCO) 5-325 MG per tablet Take 1 tablet by mouth every 6 hours as needed for Pain. Yes Historical Provider, MD   loratadine (CLARITIN) 10 MG capsule Take 10 mg by mouth as needed  Patient not taking: Reported on 2022    Historical Provider, MD   Ascorbic Acid (VITAMIN C PO) Take by mouth daily  Patient not taking: Reported on 2022    Historical Provider, MD   vitamin D 25 MCG (1000 UT) CAPS Take by mouth daily    Historical Provider, MD   amLODIPine (NORVASC) 5 MG tablet Take 1 tablet by mouth daily 22   Saul Whelan MD   omeprazole 20 MG EC tablet Take 20 mg by mouth daily    Historical Provider, MD       Current medications:    Current Facility-Administered Medications   Medication Dose Route Frequency Provider Last Rate Last Admin    lactated ringers infusion   IntraVENous Continuous Moo Steiner MD           Allergies:     Allergies   Allergen Reactions    Penicillins Hives and Swelling    Rofecoxib Hives and Swelling    Sulfa Antibiotics Hives and Swelling    Tegaderm Ag Mesh [Silver]      Had blisters under tegaderm       Problem List:    Patient Active Problem List   Diagnosis Code    Rheumatoid arthritis involving multiple sites ([facility] M06.9    Skin lesion of face L98.9    Ventral hernia without obstruction or gangrene K43.9    BMI 40.0-44.9, adult (HCC) Z68.41    Mixed hyperlipidemia E78.2    Seasonal allergic rhinitis J30.2    Gastroesophageal reflux disease with esophagitis without hemorrhage K21.00    History of posttraumatic stress disorder (PTSD) Z86.59    Renal failure N19    Urticaria due to drug allergy L50.0, T50.905A    Cellulitis of right wrist L03. 113    Hypocalcemia E83.51    Salmonella food poisoning A02.9    Prediabetes R73.03    Endometrial thickening on ultrasound R93.89    Elevated blood-pressure reading, without diagnosis of hypertension R03.0    Intractable vomiting R11.10    SHEELA (acute kidney injury) ([facility] N17.9    Primary hypertension I10    Abnormal mammogram of both breasts R92.8    Abnormal mammogram of right breast R92.8       Past Medical History:        Diagnosis Date    Food poisoning 04/2022    resulting in dehydration with SHEELA    Hypertension     PTSD (post-traumatic stress disorder)     Rheumatoid arthritis ([facility]     Skin cancer        Past Surgical History:        Procedure Laterality Date    ANTERIOR CRUCIATE LIGAMENT REPAIR      Left knee    COLONOSCOPY N/A 6/16/2022    COLONOSCOPY DIAGNOSTIC performed by Juana Morton MD at [place] [address]      Right knee    MOHS SURGERY Right     nose with skin graft from cheek    TUBAL LIGATION Bilateral     UPPER GASTROINTESTINAL ENDOSCOPY N/A 6/16/2022    EGD BIOPSY performed by Juana Morton MD at [address] [place] [address] RIGHT Right 6/20/2022    US BREAST NEEDLE BIOPSY RIGHT 6/20/2022 [facility] ULTRASOUND       Social History:    Social History     Tobacco Use    Smoking status: Never Smoker    Smokeless tobacco: Never Used   Substance Use Topics    Alcohol use: Yes     Comment: occasionally                                Counseling given: Not Answered      Vital Signs (Current):   Vitals:    07/01/22 1104   Weight: 222 lb (100.7 kg)   Height: 5' 3\" (1.6 m)                                              BP Readings from Last 3 Encounters:   06/28/22 (!) 152/100   06/16/22 (!) 146/95   06/07/22 (!) 152/100       NPO Status: BMI:   Wt Readings from Last 3 Encounters:   07/01/22 222 lb (100.7 kg)   06/28/22 222 lb (100.7 kg)   06/16/22 222 lb (100.7 kg)     Body mass index is 39.33 kg/m². CBC:   Lab Results   Component Value Date/Time    WBC 9.6 06/01/2022 10:29 AM    RBC 4.60 06/01/2022 10:29 AM    HGB 12.5 06/01/2022 10:29 AM    HCT 38.4 06/01/2022 10:29 AM    MCV 83.6 06/01/2022 10:29 AM    RDW 16.2 06/01/2022 10:29 AM     06/01/2022 10:29 AM       CMP:   Lab Results   Component Value Date/Time     06/01/2022 10:29 AM    K 3.8 06/01/2022 10:29 AM    K 3.9 04/07/2022 07:48 AM     06/01/2022 10:29 AM    CO2 21 06/01/2022 10:29 AM    BUN 12 06/01/2022 10:29 AM    CREATININE 1.0 06/01/2022 10:29 AM    GFRAA >60 06/01/2022 10:29 AM    AGRATIO 1.4 04/02/2022 10:20 AM    LABGLOM 55 06/01/2022 10:29 AM    GLUCOSE 113 06/01/2022 10:29 AM    PROT 8.0 04/02/2022 10:20 AM    CALCIUM 8.8 06/01/2022 10:29 AM    BILITOT 0.6 04/02/2022 10:20 AM    ALKPHOS 93 04/02/2022 10:20 AM    AST 12 04/02/2022 10:20 AM    ALT 20 04/02/2022 10:20 AM       POC Tests: No results for input(s): POCGLU, POCNA, POCK, POCCL, POCBUN, POCHEMO, POCHCT in the last 72 hours.     Coags: No results found for: PROTIME, INR, APTT    HCG (If Applicable): No results found for: PREGTESTUR, PREGSERUM, HCG, HCGQUANT     ABGs: No results found for: PHART, PO2ART, KWR3WPW, VTU2HZF, BEART, I9OIDFNQ     Type & Screen (If Applicable):  No results found for: LABABO, LABRH    Drug/Infectious Status (If Applicable):  No results found for: HIV, HEPCAB    COVID-19 Screening (If Applicable):   Lab Results   Component Value Date/Time    COVID19 Not Detected 04/02/2022 10:40 PM           Anesthesia Evaluation  Patient summary reviewed and Nursing notes reviewed no history of anesthetic complications:   Airway: Mallampati: II  TM distance: >3 FB   Neck ROM: full  Mouth opening: > = 3 FB   Dental: normal exam Pulmonary:       (-) asthma and shortness of breath                           Cardiovascular:    (+) hypertension:,     (-)  angina                Neuro/Psych:      (-) CVA           GI/Hepatic/Renal:        (-) GERD and liver disease       Endo/Other:    (+) : arthritis: rheumatoid. , .    (-) diabetes mellitus, hypothyroidism               Abdominal:             Vascular:     - PVD. Other Findings:           Anesthesia Plan      general     ASA 3       Induction: intravenous. MIPS: Postoperative opioids intended and Prophylactic antiemetics administered. Anesthetic plan and risks discussed with patient. Use of blood products discussed with patient whom. Plan discussed with CRNA.                     Gifty Ibarra MD   7/11/2022

## 2022-07-11 NOTE — PROGRESS NOTES
Pt arrived from OR to PACU bay 8. Report received from OR staff. Pt arousable to voice. Surgical incisions dressings in place to ABD. Pt on 4L NC, NSR, and VSS. Will continue to monitor.

## 2022-07-11 NOTE — BRIEF OP NOTE
Brief Postoperative Note      Patient: Alfredo Mike  YOB: 1955  MRN: [id number]    Date of Procedure: 7/11/2022    Pre-Op Diagnosis: Incarcerated incisional hernia [K43.0]    Post-Op Diagnosis: Same       Procedure(s):  LAPAROSCOPIC REPAIR OF INCARCERATED INCISIONAL HERNIA    Surgeon(s):  Fred Hawkins MD    Assistant:  Surgical Assistant: Shaun Ahuja    Anesthesia: General    Estimated Blood Loss (mL): Minimal    Complications: None    Specimens:   ID Type Source Tests Collected by Time Destination   A : A) [address] [facility] Fred Hawkins MD 7/11/2022 1118        Implants:  Implant Name Type Inv.  Item Serial No.  Lot No. LRB No. Used Action   MESH STEPHEN [id number] ELLIPSE W/ ECHO PS POS SYS VENTRALIGHT ST - [id number]  MESH STEPHEN Farhat Tejeda W/ ECHO PS POS SYS VENTRALIGHT ST  BARD DAVOL-WD [id number] N/A 1 Implanted         Drains: * No LDAs found *    Findings: Incarcerated omentum and TV colon reduced, repair completed with Ventralight mesh    Electronically signed by Fred Hawkins MD on 7/11/2022 at 11:38 AM

## 2022-07-26 ENCOUNTER — OFFICE VISIT (OUTPATIENT)
Dept: SURGERY | Age: 67
End: 2022-07-26

## 2022-07-26 VITALS — SYSTOLIC BLOOD PRESSURE: 145 MMHG | BODY MASS INDEX: 41.27 KG/M2 | WEIGHT: 233 LBS | DIASTOLIC BLOOD PRESSURE: 80 MMHG

## 2022-07-26 DIAGNOSIS — K43.0 INCARCERATED INCISIONAL HERNIA: Primary | ICD-10-CM

## 2022-07-26 PROCEDURE — 99024 POSTOP FOLLOW-UP VISIT: CPT | Performed by: SURGERY

## 2022-07-26 NOTE — PROGRESS NOTES
[facility]              PATIENT NAME: Pavel Win     TODAY'S DATE: 7/26/2022    SUBJECTIVE:      Pt. returns to the office today following a 7/26/22 ventral incisional hernia repair with mesh. She had surgery at [facility]. She has been recovering well to date, with progression towards normal activity and diet. She has no complaints today. OBJECTIVE:   VITALS:  BP (!) 145/80   Wt 233 lb (105.7 kg)   BMI 41.27 kg/m²                                  CONSTITUTIONAL:  awake and alert  LUNGS:  clear to auscultation  ABDOMEN:   Hernia repair is intact, normal bowel sounds, soft, non-distended, non-tender   INCISION: clean, dry, mild bruising below navel region        ASSESSMENT AND PLAN:  74 y.o. female status post ventral incisional hernia repair. Doing well. Her recovery is progressing uneventfully, and she is released to progressive normal activity. She will call or return for any additional problems or questions. RTC Dr. Jefferson Rodriguez in 2 mos or so for completion colonoscopy.       Vincenzo Manuel MD

## 2022-08-03 ENCOUNTER — TELEPHONE (OUTPATIENT)
Dept: INTERNAL MEDICINE CLINIC | Age: 67
End: 2022-08-03

## 2022-08-03 NOTE — TELEPHONE ENCOUNTER
Pt to call GI office for appt --will try to get pt in to see someone here as well. In no acute distress, but worried she could have another episode of acute renal failure.

## 2022-08-03 NOTE — TELEPHONE ENCOUNTER
Recent hernia surgery --thought to help the vomiting issues--unsuccessful.   Pt ok with appt tomorrow-  she will call GI office again  (closed for lunch)

## 2022-08-04 ENCOUNTER — OFFICE VISIT (OUTPATIENT)
Dept: INTERNAL MEDICINE CLINIC | Age: 67
End: 2022-08-04
Payer: MEDICARE

## 2022-08-04 VITALS
SYSTOLIC BLOOD PRESSURE: 132 MMHG | BODY MASS INDEX: 38.97 KG/M2 | DIASTOLIC BLOOD PRESSURE: 88 MMHG | OXYGEN SATURATION: 97 % | WEIGHT: 220 LBS | HEART RATE: 93 BPM

## 2022-08-04 DIAGNOSIS — I10 PRIMARY HYPERTENSION: ICD-10-CM

## 2022-08-04 DIAGNOSIS — Z87.19 H/O VENTRAL HERNIA REPAIR: ICD-10-CM

## 2022-08-04 DIAGNOSIS — K21.00 GASTROESOPHAGEAL REFLUX DISEASE WITH ESOPHAGITIS WITHOUT HEMORRHAGE: ICD-10-CM

## 2022-08-04 DIAGNOSIS — R73.03 PREDIABETES: ICD-10-CM

## 2022-08-04 DIAGNOSIS — R11.2 INTRACTABLE VOMITING WITH NAUSEA, UNSPECIFIED VOMITING TYPE: Primary | ICD-10-CM

## 2022-08-04 DIAGNOSIS — Z87.448 HISTORY OF ACUTE RENAL FAILURE: ICD-10-CM

## 2022-08-04 DIAGNOSIS — Z98.890 H/O VENTRAL HERNIA REPAIR: ICD-10-CM

## 2022-08-04 PROBLEM — L98.9 SKIN LESION OF FACE: Status: RESOLVED | Noted: 2022-03-28 | Resolved: 2022-08-04

## 2022-08-04 PROBLEM — N17.9 AKI (ACUTE KIDNEY INJURY) (HCC): Status: RESOLVED | Noted: 2022-04-28 | Resolved: 2022-08-04

## 2022-08-04 PROBLEM — R03.0 ELEVATED BLOOD-PRESSURE READING, WITHOUT DIAGNOSIS OF HYPERTENSION: Status: RESOLVED | Noted: 2022-04-15 | Resolved: 2022-08-04

## 2022-08-04 PROBLEM — L03.113 CELLULITIS OF RIGHT WRIST: Status: RESOLVED | Noted: 2022-04-15 | Resolved: 2022-08-04

## 2022-08-04 PROBLEM — K43.9 VENTRAL HERNIA WITHOUT OBSTRUCTION OR GANGRENE: Status: RESOLVED | Noted: 2022-03-28 | Resolved: 2022-08-04

## 2022-08-04 PROBLEM — Z85.828 HISTORY OF BASAL CELL CARCINOMA (BCC) EXCISION: Status: ACTIVE | Noted: 2022-08-04

## 2022-08-04 PROBLEM — N19 RENAL FAILURE: Status: RESOLVED | Noted: 2022-04-02 | Resolved: 2022-08-04

## 2022-08-04 PROCEDURE — 99214 OFFICE O/P EST MOD 30 MIN: CPT | Performed by: INTERNAL MEDICINE

## 2022-08-04 PROCEDURE — 1090F PRES/ABSN URINE INCON ASSESS: CPT | Performed by: INTERNAL MEDICINE

## 2022-08-04 PROCEDURE — G8417 CALC BMI ABV UP PARAM F/U: HCPCS | Performed by: INTERNAL MEDICINE

## 2022-08-04 PROCEDURE — G8399 PT W/DXA RESULTS DOCUMENT: HCPCS | Performed by: INTERNAL MEDICINE

## 2022-08-04 PROCEDURE — 1123F ACP DISCUSS/DSCN MKR DOCD: CPT | Performed by: INTERNAL MEDICINE

## 2022-08-04 PROCEDURE — G8427 DOCREV CUR MEDS BY ELIG CLIN: HCPCS | Performed by: INTERNAL MEDICINE

## 2022-08-04 PROCEDURE — 3017F COLORECTAL CA SCREEN DOC REV: CPT | Performed by: INTERNAL MEDICINE

## 2022-08-04 PROCEDURE — 1036F TOBACCO NON-USER: CPT | Performed by: INTERNAL MEDICINE

## 2022-08-04 ASSESSMENT — ENCOUNTER SYMPTOMS
CHEST TIGHTNESS: 0
COLOR CHANGE: 0
SORE THROAT: 0
CONSTIPATION: 0
ABDOMINAL PAIN: 0
NAUSEA: 1
VOMITING: 1
WHEEZING: 0
BACK PAIN: 0
COUGH: 0
SHORTNESS OF BREATH: 0

## 2022-08-04 NOTE — ASSESSMENT & PLAN NOTE
Unlikely related to recent surgery, abdominal exam today is with benign findings and no evidence of acute abdomen, no evidence of dehydration, has not been getting relief with Zofran or Phenergan however she has been able to keep p.o. intake.   Advised to increase omeprazole to 40 mg instead of 20, will check labs and await further recommendations from GI

## 2022-08-04 NOTE — ASSESSMENT & PLAN NOTE
Pressure stable on current dose of amlodipine, continue same along with maintaining low-salt diet, continue attempts for weight loss, follow-up with nephrology as scheduled next month

## 2022-08-04 NOTE — ASSESSMENT & PLAN NOTE
Update hemoglobin A1c, maintain low-carb diet and continue attempts for weight loss, further action will be pending lab results

## 2022-08-04 NOTE — ASSESSMENT & PLAN NOTE
Do a trial of increasing omeprazole to 40 mg daily as stated above, continue attempts to maintain antireflux diet and follow-up with GI as scheduled

## 2022-08-04 NOTE — ASSESSMENT & PLAN NOTE
No evidence of dehydration on exam, she is scheduled to follow-up with nephrology next month and will have lab work prior to her visit however will check BMP today.

## 2022-08-04 NOTE — PROGRESS NOTES
ASSESSMENT/PLAN:  1. Intractable vomiting with nausea, unspecified vomiting type  Assessment & Plan:   Unlikely related to recent surgery, abdominal exam today is with benign findings and no evidence of acute abdomen, no evidence of dehydration, has not been getting relief with Zofran or Phenergan however she has been able to keep p.o. intake. Advised to increase omeprazole to 40 mg instead of 20, will check labs and await further recommendations from GI  2. Primary hypertension  Assessment & Plan:   Pressure stable on current dose of amlodipine, continue same along with maintaining low-salt diet, continue attempts for weight loss, follow-up with nephrology as scheduled next month  3. Prediabetes  Assessment & Plan:   Update hemoglobin A1c, maintain low-carb diet and continue attempts for weight loss, further action will be pending lab results  Orders:  -     Hemoglobin A1C; Future  4. Gastroesophageal reflux disease with esophagitis without hemorrhage  Assessment & Plan:   Do a trial of increasing omeprazole to 40 mg daily as stated above, continue attempts to maintain antireflux diet and follow-up with GI as scheduled  5. History of acute renal failure  Assessment & Plan:   No evidence of dehydration on exam, she is scheduled to follow-up with nephrology next month and will have lab work prior to her visit however will check BMP today. 6. H/O ventral hernia repair  Assessment & Plan:   Completed. Follow-up with surgery, exam today is with benign findings      Return for as scheduled. SUBJECTIVE  HPI:   Patient here today complaining of nausea and vomiting, she originally had the symptoms following an episode of food poisoning that resulted in acute renal failure however she subsequently covered, underwent endoscopy and colonoscopy that were inconclusive and she was recommended to have ventral hernia repair as it was becoming incarcerated and finally she underwent that hernia repair surgery 3-1/2 weeks ago. She did well although states she started having nausea 2 days after released home, she did have her postop exam last week and at that time did not have any symptoms but she called in yesterday because she started vomiting with the nausea. So far she vomited once, states it can be sporadic with the food. She is denying abdominal pain, she is concerned that she will get renal failure again since she started vomiting, she did put out a call to her gastroenterologist and still waiting to hear from them  Otherwise she has been doing well, she had the skin lesion from the face removed that turned out to be basal cell carcinoma, she had abnormal mammograms that were followed up by diagnostic mammograms and had a stereotactic biopsy that was negative for malignancy, she also underwent endometrial biopsy due to abnormal findings on imaging and that came back also negative      Review of Systems   Constitutional:  Negative for activity change, appetite change and fatigue. HENT:  Negative for congestion, hearing loss, mouth sores and sore throat. Respiratory:  Negative for cough, chest tightness, shortness of breath and wheezing. Cardiovascular:  Negative for chest pain, palpitations and leg swelling. Gastrointestinal:  Positive for nausea and vomiting. Negative for abdominal pain and constipation. Genitourinary:  Negative for difficulty urinating, dysuria, frequency, hematuria and urgency. Musculoskeletal:  Negative for arthralgias, back pain, gait problem and joint swelling. Skin:  Negative for color change. Allergic/Immunologic: Negative for environmental allergies and immunocompromised state. Neurological:  Negative for dizziness, light-headedness and headaches. Psychiatric/Behavioral:  Negative for behavioral problems and dysphoric mood.       OBJECTIVE:    /88   Pulse 93   Wt 220 lb (99.8 kg)   SpO2 97%   BMI 38.97 kg/m²    Physical Exam  Constitutional:       General: She is not in acute distress. Appearance: Normal appearance. She is obese. She is not toxic-appearing. HENT:      Head: Normocephalic. Cardiovascular:      Rate and Rhythm: Normal rate and regular rhythm. Heart sounds: Normal heart sounds. Pulmonary:      Effort: Pulmonary effort is normal. No respiratory distress. Abdominal:      Palpations: Abdomen is soft. There is no mass. Tenderness: There is no abdominal tenderness. There is no rebound. Hernia: No hernia is present. Comments: Surgical scars healing well, mild sub umbilical superficial bruising that is resolving   Musculoskeletal:      Cervical back: Neck supple. Skin:     General: Skin is warm and dry. Neurological:      General: No focal deficit present. Mental Status: She is alert. Psychiatric:         Mood and Affect: Mood normal.         Behavior: Behavior normal.         Electronically signed by Noelle Zarate MD on 8/4/2022 at 12:17 PM.    This dictation was generated by voice recognition computer software. Although all attempts are made to edit the dictation for accuracy, there may be errors in the transcription that are not intended.

## 2022-08-05 ENCOUNTER — TELEPHONE (OUTPATIENT)
Dept: SURGERY | Age: 67
End: 2022-08-05

## 2022-08-05 NOTE — TELEPHONE ENCOUNTER
Patient is s/p 7/11 PO- LAPAROSCOPIC REPAIR OF INCARCERATED INCISIONAL HERNIA . She has been expierciing nausea and vommiting for a few days. She spoke to her PCP and her GI doctor and they both told her to call Dr Fabiola Squires to make him aware. She states she has been vomitting for 4 days mostly at night. This is something she has had issues with in the past and they thought the hernia repair would help with it. She isnt sure what Dr. Fabiola Squires can do, but she wanted to do as she was told my her PCP and GI.     Please advise [phone]

## 2022-08-05 NOTE — TELEPHONE ENCOUNTER
I discussed with Dr. Barry Mccarty since Dr. Fabiola Squires is not in town. Pt is too far out from surgery to start having issues from surgery therefore this will not be hernia repair related. Usually issues like that will be within the first couple days from surgery, if any. I called and talked to pt, she reiterated that this was something that was happening before surgery, her PCP and GI were hoping that it would stop after having the hernia repaired. She has seen her PCP on Thursday 8/4, was told the hernia incision looks good and the abdomen is soft like its supposed to be. Pt does have a f/u appointment with her GI doc in 2 weeks. I told her that I was more than happy to make an appointment for her to see Nasrin Jansen when he comes back but she agrees that she doesn't think it's related to the hernia/repair done. I will talk to Nasrin Jansen when he comes back and if he wants to see her then I will call her.

## 2022-08-29 DIAGNOSIS — N17.9 AKI (ACUTE KIDNEY INJURY) (HCC): ICD-10-CM

## 2022-08-29 LAB
ALBUMIN SERPL-MCNC: 4.3 G/DL (ref 3.4–5)
ANION GAP SERPL CALCULATED.3IONS-SCNC: 12 MMOL/L (ref 3–16)
BUN BLDV-MCNC: 15 MG/DL (ref 7–20)
CALCIUM SERPL-MCNC: 9.8 MG/DL (ref 8.3–10.6)
CHLORIDE BLD-SCNC: 102 MMOL/L (ref 99–110)
CO2: 24 MMOL/L (ref 21–32)
CREAT SERPL-MCNC: 1.3 MG/DL (ref 0.6–1.2)
CREATININE URINE: 177.1 MG/DL (ref 28–259)
GFR AFRICAN AMERICAN: 49
GFR NON-AFRICAN AMERICAN: 41
GLUCOSE BLD-MCNC: 116 MG/DL (ref 70–99)
HCT VFR BLD CALC: 41.5 % (ref 36–48)
HEMOGLOBIN: 13.4 G/DL (ref 12–16)
MAGNESIUM: 1.4 MG/DL (ref 1.8–2.4)
MCH RBC QN AUTO: 27.2 PG (ref 26–34)
MCHC RBC AUTO-ENTMCNC: 32.4 G/DL (ref 31–36)
MCV RBC AUTO: 83.9 FL (ref 80–100)
PDW BLD-RTO: 14.4 % (ref 12.4–15.4)
PHOSPHORUS: 3 MG/DL (ref 2.5–4.9)
PLATELET # BLD: 303 K/UL (ref 135–450)
PMV BLD AUTO: 8.1 FL (ref 5–10.5)
POTASSIUM SERPL-SCNC: 4.1 MMOL/L (ref 3.5–5.1)
PROTEIN PROTEIN: 20 MG/DL
PROTEIN/CREAT RATIO: 0.1 MG/DL
RBC # BLD: 4.94 M/UL (ref 4–5.2)
SODIUM BLD-SCNC: 138 MMOL/L (ref 136–145)
WBC # BLD: 11 K/UL (ref 4–11)

## 2022-09-06 PROBLEM — E83.42 HYPOMAGNESEMIA: Status: ACTIVE | Noted: 2022-09-06

## 2022-09-14 ENCOUNTER — HOSPITAL ENCOUNTER (OUTPATIENT)
Dept: NUCLEAR MEDICINE | Age: 67
Discharge: HOME | End: 2022-09-14
Payer: MEDICARE

## 2022-09-14 DIAGNOSIS — R11.2 NAUSEA AND VOMITING, INTRACTABILITY OF VOMITING NOT SPECIFIED, UNSPECIFIED VOMITING TYPE: ICD-10-CM

## 2022-09-14 PROCEDURE — 3430000000 HC RX DIAGNOSTIC RADIOPHARMACEUTICAL: Performed by: INTERNAL MEDICINE

## 2022-09-14 PROCEDURE — A9541 TC99M SULFUR COLLOID: HCPCS | Performed by: INTERNAL MEDICINE

## 2022-09-14 PROCEDURE — 78264 GASTRIC EMPTYING IMG STUDY: CPT

## 2022-09-14 RX ADMIN — Medication 0.9 MILLICURIE: at 08:52

## 2022-09-23 DIAGNOSIS — Z87.448 HISTORY OF ACUTE RENAL FAILURE: ICD-10-CM

## 2022-09-23 DIAGNOSIS — E83.42 HYPOMAGNESEMIA: ICD-10-CM

## 2022-09-23 LAB
ANION GAP SERPL CALCULATED.3IONS-SCNC: 14 MMOL/L (ref 3–16)
BUN BLDV-MCNC: 18 MG/DL (ref 7–20)
CALCIUM SERPL-MCNC: 10 MG/DL (ref 8.3–10.6)
CHLORIDE BLD-SCNC: 105 MMOL/L (ref 99–110)
CO2: 22 MMOL/L (ref 21–32)
CREAT SERPL-MCNC: 1.1 MG/DL (ref 0.6–1.2)
GFR AFRICAN AMERICAN: 60
GFR NON-AFRICAN AMERICAN: 49
GLUCOSE BLD-MCNC: 117 MG/DL (ref 70–99)
HCT VFR BLD CALC: 42.8 % (ref 36–48)
HEMOGLOBIN: 13.9 G/DL (ref 12–16)
MAGNESIUM: 1.9 MG/DL (ref 1.8–2.4)
MCH RBC QN AUTO: 27.2 PG (ref 26–34)
MCHC RBC AUTO-ENTMCNC: 32.5 G/DL (ref 31–36)
MCV RBC AUTO: 83.8 FL (ref 80–100)
PDW BLD-RTO: 15.1 % (ref 12.4–15.4)
PLATELET # BLD: 281 K/UL (ref 135–450)
PMV BLD AUTO: 7.9 FL (ref 5–10.5)
POTASSIUM SERPL-SCNC: 4.5 MMOL/L (ref 3.5–5.1)
RBC # BLD: 5.11 M/UL (ref 4–5.2)
SODIUM BLD-SCNC: 141 MMOL/L (ref 136–145)
WBC # BLD: 10.6 K/UL (ref 4–11)

## 2022-09-26 ENCOUNTER — OFFICE VISIT (OUTPATIENT)
Dept: INTERNAL MEDICINE CLINIC | Age: 67
End: 2022-09-26
Payer: MEDICARE

## 2022-09-26 VITALS
BODY MASS INDEX: 38.59 KG/M2 | HEART RATE: 85 BPM | WEIGHT: 217.8 LBS | HEIGHT: 63 IN | SYSTOLIC BLOOD PRESSURE: 124 MMHG | DIASTOLIC BLOOD PRESSURE: 84 MMHG | OXYGEN SATURATION: 97 %

## 2022-09-26 DIAGNOSIS — M06.9 RHEUMATOID ARTHRITIS INVOLVING MULTIPLE SITES, UNSPECIFIED WHETHER RHEUMATOID FACTOR PRESENT (HCC): ICD-10-CM

## 2022-09-26 DIAGNOSIS — Z00.00 MEDICARE ANNUAL WELLNESS VISIT, SUBSEQUENT: Primary | ICD-10-CM

## 2022-09-26 DIAGNOSIS — M17.0 OSTEOARTHRITIS OF BOTH KNEES, UNSPECIFIED OSTEOARTHRITIS TYPE: ICD-10-CM

## 2022-09-26 PROCEDURE — 1123F ACP DISCUSS/DSCN MKR DOCD: CPT | Performed by: INTERNAL MEDICINE

## 2022-09-26 PROCEDURE — G0439 PPPS, SUBSEQ VISIT: HCPCS | Performed by: INTERNAL MEDICINE

## 2022-09-26 PROCEDURE — 3017F COLORECTAL CA SCREEN DOC REV: CPT | Performed by: INTERNAL MEDICINE

## 2022-09-26 ASSESSMENT — PATIENT HEALTH QUESTIONNAIRE - PHQ9
SUM OF ALL RESPONSES TO PHQ QUESTIONS 1-9: 2
SUM OF ALL RESPONSES TO PHQ9 QUESTIONS 1 & 2: 2
SUM OF ALL RESPONSES TO PHQ QUESTIONS 1-9: 2
2. FEELING DOWN, DEPRESSED OR HOPELESS: 0
SUM OF ALL RESPONSES TO PHQ QUESTIONS 1-9: 2
1. LITTLE INTEREST OR PLEASURE IN DOING THINGS: 2
SUM OF ALL RESPONSES TO PHQ QUESTIONS 1-9: 2

## 2022-09-26 ASSESSMENT — ENCOUNTER SYMPTOMS
BACK PAIN: 0
SHORTNESS OF BREATH: 0
NAUSEA: 1
CHEST TIGHTNESS: 0
COLOR CHANGE: 0
COUGH: 0
WHEEZING: 0
ABDOMINAL PAIN: 0
CONSTIPATION: 0
VOMITING: 1
SORE THROAT: 0

## 2022-09-26 ASSESSMENT — LIFESTYLE VARIABLES
HOW OFTEN DO YOU HAVE A DRINK CONTAINING ALCOHOL: MONTHLY OR LESS
HOW MANY STANDARD DRINKS CONTAINING ALCOHOL DO YOU HAVE ON A TYPICAL DAY: 1 OR 2

## 2022-09-26 NOTE — ASSESSMENT & PLAN NOTE
Worsening joint pain, this is multifactorial with history of rheumatoid along with primary and traumatic osteoarthritis, patient reporting side effects with disease modifying agents in the past however has not seen rheumatologist in a long number of years, will place new referral for reevaluation, advised will probably need to return to orthopedic surgery for her knee pain since she had surgical intervention on both knees in the past.  Reinforced recommendations to continue attempts for weight loss and maintain healthy low-carb diet, increase level of physical activity as tolerated

## 2022-09-26 NOTE — PROGRESS NOTES
Medicare Annual Wellness Visit  Name: Juana Mehta Date: 2022   MRN: [id number] Sex: Female   Age: 69 y.o. Ethnicity: Non- / Non    : 1955 Race: White (non-)      Manish De Souza is here for Medicare AWV     Screenings for behavioral, psychosocial and functional/safety risks, and cognitive dysfunction are all negative except as indicated below. These results, as well as other patient data from the [address] form, are documented in Flowsheets linked to this Encounter. Allergies   Allergen Reactions    Penicillins Hives and Swelling    Rofecoxib Hives and Swelling    Sulfa Antibiotics Hives and Swelling    Tegaderm Ag Mesh [Silver]      Had blisters under tegaderm       Prior to Visit Medications    Medication Sig Taking?  Authorizing Provider   Handicap Placard MISC by Does not apply route Valid 2022 till 2027 Yes Vladislav Rojas MD   amLODIPine (NORVASC) 5 MG tablet TAKE 1 TABLET BY MOUTH DAILY Yes Tuan Reyes MD   loratadine (CLARITIN) 10 MG capsule Take 10 mg by mouth as needed Yes Historical Provider, MD   Ascorbic Acid (VITAMIN C PO) Take by mouth daily Yes Historical Provider, MD   vitamin D 25 MCG (1000 UT) CAPS Take by mouth daily Yes Historical Provider, MD   omeprazole 20 MG EC tablet Take 20 mg by mouth daily Yes Historical Provider, MD       Past Medical History:   Diagnosis Date    Food poisoning 2022    resulting in dehydration with SHEELA    Hypertension     PTSD (post-traumatic stress disorder)     Rheumatoid arthritis ([facility] [address]     Skin cancer      Past Surgical History:   Procedure Laterality Date    ANTERIOR CRUCIATE LIGAMENT REPAIR      Left knee    COLONOSCOPY N/A 2022    COLONOSCOPY DIAGNOSTIC performed by Sanjuanita Vazquez MD at [address] [place] [address] Rd., [address]      Right knee    MOHS SURGERY Right     nose with skin graft from cheek    TUBAL LIGATION Bilateral     UPPER GASTROINTESTINAL ENDOSCOPY N/A Allergic/Immunologic: Negative for environmental allergies and immunocompromised state. Neurological:  Negative for dizziness, light-headedness and headaches. Psychiatric/Behavioral:  Negative for behavioral problems, dysphoric mood and sleep disturbance. The patient is not nervous/anxious. Physical Exam  Constitutional:       General: She is not in acute distress. Appearance: Normal appearance. She is obese. She is not toxic-appearing. HENT:      Head: Normocephalic. Eyes:      Conjunctiva/sclera: Conjunctivae normal.   Cardiovascular:      Rate and Rhythm: Normal rate and regular rhythm. Heart sounds: Normal heart sounds. Pulmonary:      Effort: Pulmonary effort is normal. No respiratory distress. Abdominal:      Palpations: Abdomen is soft. Comments: Exam limited by obesity   Musculoskeletal:         General: Swelling and tenderness present. Normal range of motion. Cervical back: Neck supple. Right lower leg: No edema. Left lower leg: No edema. Skin:     General: Skin is warm and dry. Findings: No lesion. Neurological:      General: No focal deficit present. Mental Status: She is alert. Cranial Nerves: No cranial nerve deficit. Psychiatric:         Mood and Affect: Mood normal.        Patient's complete Health Risk Assessment and screening values have been reviewed and are found in Flowsheets. The following problems were reviewed today and where indicated follow up appointments were made and/or referrals ordered.     Positive Risk Factor Screenings with Interventions:     Fall Risk:  Do you feel unsteady or are you worried about falling? : (!) yes  2 or more falls in past year?: (!) yes  Fall with injury in past year?: no         General Health and ACP:  General  In general, how would you say your health is?: Fair  In the past 7 days, have you experienced any of the following: New or Increased Pain, New or Increased Fatigue, Loneliness, Social Isolation, Stress or Anger?: (!) Yes  Select all that apply: (!) Stress  Do you get the social and emotional support that you need?: Yes  Do you have a Living Will?: Yes    Advance Directives       Power of  Living Will ACP-Advance Directive ACP-Power of     Not on File Filed on 07/11/22 Filed Not on File          Health Habits/Nutrition:  Physical Activity: Insufficiently Active    Days of Exercise per Week: 2 days    Minutes of Exercise per Session: 10 min     Have you lost any weight without trying in the past 3 months?: (!) Yes  Body mass index: (!) 38.58  Have you seen the dentist within the past year?: (!) No  Health Habits/Nutrition Interventions:  Increase level of physical activity as tolerated    Hearing/Vision:  Do you or your family notice any trouble with your hearing that hasn't been managed with hearing aids?: No  Do you have difficulty driving, watching TV, or doing any of your daily activities because of your eyesight?: No  Have you had an eye exam within the past year?: (!) No  No results found.   Safety:  Do you have working smoke detectors?: Yes  Do you have any tripping hazards - loose or unsecured carpets or rugs?: No  Do you have any tripping hazards - clutter in doorways, halls, or stairs?: No  Do you have either shower bars, grab bars, non-slip mats or non-slip surfaces in your shower or bathtub?: (!) No  Do all of your stairways have a railing or banister?: Yes  Do you always fasten your seatbelt when you are in a car?: Yes  ADLs:  In the past 7 days, did you need help from others to perform any of the following everyday activities: Eating, dressing, grooming, bathing, toileting, or walking/balance?: (!) Yes  Select all that apply: (!) Walking/Balance  In the past 7 days, did you need help from others to take care of any of the following: Laundry, housekeeping, banking/finances, shopping, telephone use, food preparation, transportation, or taking medications?: No    Personalized Preventive Plan   Current Health Maintenance Status  Immunization History   Administered Date(s) Administered    COVID-19, J&J, (age 18y+), IM, 0.5 mL 04/02/2021    Influenza Virus Vaccine 12/05/2003, 01/06/2011    Pneumococcal Polysaccharide ([id number]) 03/28/2022      Health Maintenance   Topic Date Due    DTaP/Tdap/Td vaccine (1 - Tdap) Never done    Shingles vaccine (1 of 2) Never done    COVID-19 Vaccine (2 - Booster for Effie series) 05/28/2021    Flu vaccine (1) 08/01/2022    Depression Screen  03/28/2023    Breast cancer screen  06/20/2023    A1C test (Diabetic or Prediabetic)  08/04/2023    Annual Wellness Visit (AWV)  09/27/2023    Lipids  03/28/2027    Colorectal Cancer Screen  06/16/2032    DEXA (modify frequency per FRAX score)  Completed    Pneumococcal 65+ years Vaccine  Completed    Hepatitis C screen  Completed    Hepatitis A vaccine  Aged Out    Hepatitis B vaccine  Aged Out    Hib vaccine  Aged Out    Meningococcal (ACWY) vaccine  Aged Out     Recommendations for [employer] Due: see orders and patient instructions/AVS.    1. Medicare annual wellness visit, subsequent  2.  Rheumatoid arthritis involving multiple sites, unspecified whether rheumatoid factor present ([facility] [address]  Assessment & Plan:   Worsening joint pain, this is multifactorial with history of rheumatoid along with primary and traumatic osteoarthritis, patient reporting side effects with disease modifying agents in the past however has not seen rheumatologist in a long number of years, will place new referral for reevaluation, advised will probably need to return to orthopedic surgery for her knee pain since she had surgical intervention on both knees in the past.  Reinforced recommendations to continue attempts for weight loss and maintain healthy low-carb diet, increase level of physical activity as tolerated  Orders:  -     Ursula Altamirano MD, Rheumatology, [facility]  -     [facility]; Starting Mon 9/26/2022, Disp-1 each, R-0, PrintValid 9/26/2022 till 9/26/2027  3. Osteoarthritis of both knees, unspecified osteoarthritis type  -     Handicap Placard MISC; Starting Mon 9/26/2022, Disp-1 each, R-0, PrintValid 9/26/2022 till 9/26/2027    Return in about 6 months (around 3/26/2023).     Recommended screening schedule for the next 5-10 years is provided to the patient in written form: see Patient Instructions/AVS.    Electronically signed by Noelle Zarate MD on 9/26/2022 at 10:49 AM.

## 2022-10-05 NOTE — PROGRESS NOTES
Patient reached __X__ yes  _____ no   VM instructions left ____ yes   phone number ________                                ____ no-office notified          Date __10/14/22_______  Time _0930______  Arrival __0800  hosp-endo____    Nothing to eat or drink after midnight-follow your doctors prep instructions-this may include taking a second dose of your prep after midnight  Responsible adult 25 or older to stay on site while you are here-drive you home-stay with you after  Follow any instructions your doctors office has given you  Bring a complete list of all your medications and supplements including name,dose,how often taken the day of your procedure  If you normally take the following medications in the morning please do so the AM of your procedure with a small sip of water       Heart,blood pressure,seizure,thyroid or breathing medications-use your inhalers       DO NOT take blood pressure medications ending in \"dorina\" or \"pril\" the AM of procedure or evening prior  Take half or your normal dose of any long acting insulins the night before your procedure-do not take any diabetic medications the AM of procedure  Follow your doctors instructions regarding stopping or taking  any blood thinners-if you do not have instructions-call them  Any questions call your doctor  Other _____take amlodipine am of procedure_________________________________________________________                [facility] POLICY(subject to change)             The current policy is 2 visitors per patient. There are no children allowed. Everyone must mask. Visiting hours are 8a-8p. Overnight visitors will be at the discretion of the nurse.

## 2022-10-14 ENCOUNTER — HOSPITAL ENCOUNTER (OUTPATIENT)
Age: 67
Setting detail: OUTPATIENT SURGERY
Discharge: HOME | End: 2022-10-14
Attending: INTERNAL MEDICINE | Admitting: INTERNAL MEDICINE
Payer: MEDICARE

## 2022-10-14 ENCOUNTER — ANESTHESIA (OUTPATIENT)
Dept: ENDOSCOPY | Age: 67
End: 2022-10-14
Payer: MEDICARE

## 2022-10-14 ENCOUNTER — ANESTHESIA EVENT (OUTPATIENT)
Dept: ENDOSCOPY | Age: 67
End: 2022-10-14
Payer: MEDICARE

## 2022-10-14 VITALS
RESPIRATION RATE: 17 BRPM | DIASTOLIC BLOOD PRESSURE: 55 MMHG | HEIGHT: 63 IN | WEIGHT: 214 LBS | OXYGEN SATURATION: 98 % | BODY MASS INDEX: 37.92 KG/M2 | SYSTOLIC BLOOD PRESSURE: 127 MMHG | TEMPERATURE: 97.4 F | HEART RATE: 86 BPM

## 2022-10-14 DIAGNOSIS — R19.8 ALTERED BOWEL FUNCTION: ICD-10-CM

## 2022-10-14 PROCEDURE — 7100000000 HC PACU RECOVERY - FIRST 15 MIN: Performed by: INTERNAL MEDICINE

## 2022-10-14 PROCEDURE — 88305 TISSUE EXAM BY PATHOLOGIST: CPT

## 2022-10-14 PROCEDURE — 3700000001 HC ADD 15 MINUTES (ANESTHESIA): Performed by: INTERNAL MEDICINE

## 2022-10-14 PROCEDURE — 3700000000 HC ANESTHESIA ATTENDED CARE: Performed by: INTERNAL MEDICINE

## 2022-10-14 PROCEDURE — 2580000003 HC RX 258: Performed by: NURSE ANESTHETIST, CERTIFIED REGISTERED

## 2022-10-14 PROCEDURE — 7100000001 HC PACU RECOVERY - ADDTL 15 MIN: Performed by: INTERNAL MEDICINE

## 2022-10-14 PROCEDURE — 7100000010 HC PHASE II RECOVERY - FIRST 15 MIN: Performed by: INTERNAL MEDICINE

## 2022-10-14 PROCEDURE — 3609010600 HC COLONOSCOPY POLYPECTOMY SNARE/COLD BIOPSY: Performed by: INTERNAL MEDICINE

## 2022-10-14 PROCEDURE — 6360000002 HC RX W HCPCS: Performed by: NURSE ANESTHETIST, CERTIFIED REGISTERED

## 2022-10-14 PROCEDURE — 2709999900 HC NON-CHARGEABLE SUPPLY: Performed by: INTERNAL MEDICINE

## 2022-10-14 PROCEDURE — 3609010300 HC COLONOSCOPY W/BIOPSY SINGLE/MULTIPLE: Performed by: INTERNAL MEDICINE

## 2022-10-14 PROCEDURE — 2500000003 HC RX 250 WO HCPCS: Performed by: NURSE ANESTHETIST, CERTIFIED REGISTERED

## 2022-10-14 RX ORDER — LIDOCAINE HYDROCHLORIDE 10 MG/ML
INJECTION, SOLUTION INFILTRATION; PERINEURAL PRN
Status: DISCONTINUED | OUTPATIENT
Start: 2022-10-14 | End: 2022-10-14 | Stop reason: SDUPTHER

## 2022-10-14 RX ORDER — PROPOFOL 10 MG/ML
INJECTION, EMULSION INTRAVENOUS CONTINUOUS PRN
Status: DISCONTINUED | OUTPATIENT
Start: 2022-10-14 | End: 2022-10-14 | Stop reason: SDUPTHER

## 2022-10-14 RX ORDER — PROPOFOL 10 MG/ML
INJECTION, EMULSION INTRAVENOUS PRN
Status: DISCONTINUED | OUTPATIENT
Start: 2022-10-14 | End: 2022-10-14 | Stop reason: SDUPTHER

## 2022-10-14 RX ORDER — SODIUM CHLORIDE 9 MG/ML
INJECTION, SOLUTION INTRAVENOUS CONTINUOUS PRN
Status: DISCONTINUED | OUTPATIENT
Start: 2022-10-14 | End: 2022-10-14 | Stop reason: SDUPTHER

## 2022-10-14 RX ADMIN — LIDOCAINE HYDROCHLORIDE 100 MG: 10 INJECTION, SOLUTION INFILTRATION; PERINEURAL at 09:44

## 2022-10-14 RX ADMIN — PROPOFOL 50 MG: 10 INJECTION, EMULSION INTRAVENOUS at 09:59

## 2022-10-14 RX ADMIN — PROPOFOL 140 MCG/KG/MIN: 10 INJECTION, EMULSION INTRAVENOUS at 09:45

## 2022-10-14 RX ADMIN — SODIUM CHLORIDE: 9 INJECTION, SOLUTION INTRAVENOUS at 08:40

## 2022-10-14 RX ADMIN — PROPOFOL 50 MG: 10 INJECTION, EMULSION INTRAVENOUS at 09:45

## 2022-10-14 RX ADMIN — PROPOFOL 50 MG: 10 INJECTION, EMULSION INTRAVENOUS at 09:49

## 2022-10-14 ASSESSMENT — ENCOUNTER SYMPTOMS: SHORTNESS OF BREATH: 0

## 2022-10-14 ASSESSMENT — PAIN SCALES - GENERAL
PAINLEVEL_OUTOF10: 0

## 2022-10-14 NOTE — ANESTHESIA POSTPROCEDURE EVALUATION
Department of Anesthesiology  Postprocedure Note    Patient: Kit Alfaro  MRN: [id number]  YOB: 1955  Date of evaluation: 10/14/2022      Procedure Summary     Date: 10/14/22 Room / Location: [address]    Anesthesia Start: 6416 Anesthesia Stop: 1010    Procedures:       COLONOSCOPY POLYPECTOMY SNARE/COLD BIOPSY      COLONOSCOPY WITH BIOPSY Diagnosis:       Altered bowel function      (Altered bowel function [R19.8])    Surgeons: Saintclair Cleaver, MD Responsible Provider: Raman Martinez MD    Anesthesia Type: MAC ASA Status: 3          Anesthesia Type: No value filed.     Mere Phase I: Mere Score: 9    Mere Phase II:        Anesthesia Post Evaluation    Patient location during evaluation: PACU  Patient participation: complete - patient participated  Level of consciousness: awake  Airway patency: patent  Nausea & Vomiting: no vomiting and no nausea  Complications: no  Cardiovascular status: hemodynamically stable  Respiratory status: acceptable  Hydration status: stable  Multimodal analgesia pain management approach

## 2022-10-14 NOTE — DISCHARGE INSTRUCTIONS
ENDOSCOPY DISCHARGE INSTRUCTIONS    You may experience some lightheadedness for the next several hours. Plan on quiet relaxation for the rest of today. A responsible adult needs to stay with you today. Because of the medications you received today-do not drive,operate machinery,or sign any contractual agreement for the next 24 hours. Do not drink any alcoholic beverages or take any unprescribed medications tonight. Eat bland food and avoid anything greasy or spicy initially-progress to your normal diet gradually. Diet restrictions as instructed. You may resume home medications as instructed. It is not unusual to experience some mild cramping or gas pains, and you may not have a bowel movement for several days. If you have any of the following problems, notify your physician or return to the hospital emergency room : fever, chills, excessive bleeding, excessive vomiting, difficulty swallowing, uncontrolled pain, increased abdominal distention, shortness of breath or any other problems. If you had a polyp removed, avoid strenuous activity for 48 hours. Avoid the use of aspirin or related compounds for one week, unless otherwise instructed by your physician. You may notice a small amount of blood in your next few bowel movements, but if a large amount passes, call your physician. If you have a sore throat, you may use lozenges or salt water gargles. ANESTHESIA DISCHARGE INSTRUCTIONS    Wear your seatbelt home. You are under the influence of drugs-do not drink alcohol, drive, operate machinery, make any important decisions or sign any legal documents for 24 hours. A responsible adult needs to be with you for 24 hours. You may experience lightheadedness, dizziness, or sleepiness following surgery. Rest at home today- increase activity as tolerated. Progress slowly to a regular diet unless your physician has instructed you otherwise. Drink plenty of water.   If persistent nausea and vomiting becomes a problem, call your physician. Coughing, sore throat and muscle aches are other side effects of anesthesia, and should improve with time. Do not drive or operate machinery while taking narcotics.

## 2022-10-14 NOTE — PROGRESS NOTES
Pt arrived from ENDO to PACU bay 6. Reported received from ENDO staff. Pt alert. Pt on 3L NC NSR, and VSS. Will continue to monitor.

## 2022-10-14 NOTE — ANESTHESIA PRE PROCEDURE
Department of Anesthesiology  Preprocedure Note       Name:  Darnell Eden   Age:  69 y.o.  :  1955                                          MRN:  [id number]         Date:  10/14/2022      Surgeon: Rosetta Levy):  Jillian Madrid MD    Procedure: Procedure(s):  COLONOSCOPY DIAGNOSTIC    Medications prior to admission:   Prior to Admission medications    Medication Sig Start Date End Date Taking? Authorizing Provider   Handicap Placard MISC by Does not apply route Valid 2022 till 2027   Mumtaz Pump, MD   amLODIPine (NORVASC) 5 MG tablet TAKE 1 TABLET BY MOUTH DAILY  Patient taking differently: Take 5 mg by mouth at bedtime 22   Maye Reese MD   loratadine (CLARITIN) 10 MG capsule Take 10 mg by mouth as needed    Historical Provider, MD   Ascorbic Acid (VITAMIN C PO) Take by mouth at bedtime    Historical Provider, MD   vitamin D 25 MCG (1000 UT) CAPS Take by mouth at bedtime    Historical Provider, MD   omeprazole 20 MG EC tablet Take 20 mg by mouth at bedtime    Historical Provider, MD       Current medications:    No current outpatient medications on file. No current facility-administered medications for this visit. Allergies:     Allergies   Allergen Reactions    Penicillins Hives and Swelling    Rofecoxib Hives and Swelling    Sulfa Antibiotics Hives and Swelling    Tegaderm Ag Mesh [Silver]      Had blisters under tegaderm       Problem List:    Patient Active Problem List   Diagnosis Code    [facility] involving [facility] sites [facility] M06.9    BMI 40.0-44.9, adult (HCC) Z68.41    Mixed hyperlipidemia E78.2    Seasonal allergic rhinitis J30.2    Gastroesophageal reflux disease with esophagitis without hemorrhage K21.00    History of posttraumatic stress disorder (PTSD) Z86.59    Urticaria due to drug allergy L50.0, T50.905A    Hypocalcemia E83.51    Salmonella food poisoning A02.9    Prediabetes R73.03    Endometrial thickening on ultrasound R93.89    Intractable vomiting R11.10    Primary hypertension I10    Abnormal mammogram of both breasts R92.8    Abnormal mammogram of right breast R92.8    Incarcerated incisional hernia K43.0    History of acute renal failure Z87.448    History of basal cell carcinoma (BCC) excision Z98.890, Z85.828    Hypomagnesemia E83.42    Osteoarthritis of both knees M17.0       Past Medical History:        Diagnosis Date    Food poisoning 04/2022    resulting in dehydration with SHEELA    Hypertension     PTSD (post-traumatic stress disorder)     Rheumatoid arthritis ([address]     Skin cancer        Past Surgical History:        Procedure Laterality Date    ANTERIOR CRUCIATE LIGAMENT REPAIR      Left knee    COLONOSCOPY N/A 6/16/2022    COLONOSCOPY DIAGNOSTIC performed by Artur Skinner MD at [facility]      Right knee    MOHS SURGERY Right     nose with skin graft from cheek    TUBAL LIGATION Bilateral     UPPER GASTROINTESTINAL ENDOSCOPY N/A 6/16/2022    EGD BIOPSY performed by Artur Skinner MD at [facility] Right 6/20/2022    US BREAST NEEDLE BIOPSY RIGHT 6/20/2022 [facility] ULTRASOUND    VENTRAL HERNIA REPAIR N/A 7/11/2022    LAPAROSCOPIC REPAIR OF INCARCERATED INCISIONAL HERNIA performed by Margarito Kilpatrick MD at [address]       Social History:    Social History     Tobacco Use    Smoking status: Never    Smokeless tobacco: Never   Substance Use Topics    Alcohol use: Yes     Comment: occasionally                                Counseling given: Not Answered      Vital Signs (Current): There were no vitals filed for this visit.                                            BP Readings from Last 3 Encounters:   09/26/22 124/84   09/06/22 (!) 166/80   08/04/22 132/88       NPO Status:                                                                                 BMI:   Wt Readings from Last 3 Encounters:   10/05/22 214 lb (97.1 kg)   09/26/22 217 lb 12.8 oz (98.8 kg)   09/06/22 217 lb (98.4 kg)     There is no height or weight on file to calculate BMI.    CBC:   Lab Results   Component Value Date/Time    WBC 10.6 09/23/2022 10:23 AM    RBC 5.11 09/23/2022 10:23 AM    HGB 13.9 09/23/2022 10:23 AM    HCT 42.8 09/23/2022 10:23 AM    MCV 83.8 09/23/2022 10:23 AM    RDW 15.1 09/23/2022 10:23 AM     09/23/2022 10:23 AM       CMP:   Lab Results   Component Value Date/Time     09/23/2022 10:23 AM    K 4.5 09/23/2022 10:23 AM    K 3.9 04/07/2022 07:48 AM     09/23/2022 10:23 AM    CO2 22 09/23/2022 10:23 AM    BUN 18 09/23/2022 10:23 AM    CREATININE 1.1 09/23/2022 10:23 AM    GFRAA 60 09/23/2022 10:23 AM    AGRATIO 1.4 08/04/2022 12:08 PM    LABGLOM 49 09/23/2022 10:23 AM    GLUCOSE 117 09/23/2022 10:23 AM    PROT 6.8 08/04/2022 12:08 PM    CALCIUM 10.0 09/23/2022 10:23 AM    BILITOT 0.6 08/04/2022 12:08 PM    ALKPHOS 97 08/04/2022 12:08 PM    AST 13 08/04/2022 12:08 PM    ALT 9 08/04/2022 12:08 PM       POC Tests: No results for input(s): POCGLU, POCNA, POCK, POCCL, POCBUN, POCHEMO, POCHCT in the last 72 hours.     Coags: No results found for: PROTIME, INR, APTT    HCG (If Applicable): No results found for: PREGTESTUR, PREGSERUM, HCG, HCGQUANT     ABGs: No results found for: PHART, PO2ART, OHT2RSC, AYU0JHU, BEART, Q3CKVGDG     Type & Screen (If Applicable):  No results found for: LABABO, LABRH    Drug/Infectious Status (If Applicable):  No results found for: HIV, HEPCAB    COVID-19 Screening (If Applicable):   Lab Results   Component Value Date/Time    COVID19 Not Detected 04/02/2022 10:40 PM           Anesthesia Evaluation  Patient summary reviewed and Nursing notes reviewed no history of anesthetic complications:   Airway: Mallampati: II  TM distance: >3 FB   Neck ROM: full  Mouth opening: > = 3 FB   Dental: normal exam         Pulmonary:       (-) asthma and shortness of breath                           Cardiovascular:    (+) hypertension:,     (-)  angina                Neuro/Psych:      (-) CVA           GI/Hepatic/Renal:        (-) GERD and liver disease       Endo/Other:    (+) : arthritis: rheumatoid. , .    (-) diabetes mellitus, hypothyroidism               Abdominal:             Vascular:     - PVD. Other Findings:             Anesthesia Plan      MAC     ASA 3       Induction: intravenous. MIPS: Postoperative opioids intended and Prophylactic antiemetics administered. Anesthetic plan and risks discussed with patient. Use of blood products discussed with patient whom. Plan discussed with CRNA.                     Ceci Jones MD   10/14/2022

## 2022-10-14 NOTE — H&P
Gastroenterology Note             Pre-operative History and Physical    Patient: Miguelangel Rodas  : 1955  CSN:     History Obtained From:  patient and/or guardian. HISTORY OF PRESENT ILLNESS:    The patient is a 69 y.o. female  here for colonoscopy. History for enterocolitis on CT. Last colonoscopy was incomplete due to large ventral hernia. She underwent laparoscopic ventral hernia repair. She is here for reevaluation. Past Medical History:    Past Medical History:   Diagnosis Date    Food poisoning 2022    resulting in dehydration with SHEELA    Hypertension     PTSD (post-traumatic stress disorder)     Rheumatoid arthritis ([facility]     Salmonella 2022    Skin cancer      Past Surgical History:    Past Surgical History:   Procedure Laterality Date    ANTERIOR CRUCIATE LIGAMENT REPAIR      Left knee    COLONOSCOPY N/A 2022    COLONOSCOPY DIAGNOSTIC performed by Ed Cormier MD at [address] [place] [address] Rd., [address]      Right knee    MOHS SURGERY Right     nose with skin graft from cheek    TUBAL LIGATION Bilateral     UPPER GASTROINTESTINAL ENDOSCOPY N/A 2022    EGD BIOPSY performed by Ed Cormier MD at [facility] Right 2022    US BREAST NEEDLE BIOPSY RIGHT 2022 [facility] ULTRASOUND    VENTRAL HERNIA REPAIR N/A 2022    LAPAROSCOPIC REPAIR OF INCARCERATED INCISIONAL HERNIA performed by Radha Zapata MD at [address]     Medications Prior to Admission:   No current facility-administered medications on file prior to encounter.      Current Outpatient Medications on File Prior to Encounter   Medication Sig Dispense Refill    amLODIPine (NORVASC) 5 MG tablet TAKE 1 TABLET BY MOUTH DAILY (Patient taking differently: Take 5 mg by mouth at bedtime) 30 tablet 2    loratadine (CLARITIN) 10 MG capsule Take 10 mg by mouth as needed      Ascorbic Acid (VITAMIN C PO) Take by mouth at bedtime      vitamin D 25 MCG (1000 UT) CAPS Take by mouth at bedtime      omeprazole 20 MG EC tablet Take 20 mg by mouth at bedtime          Allergies:  Penicillins, Rofecoxib, Sulfa antibiotics, and Tegaderm ag mesh [silver]      Social History:   Social History     Tobacco Use    Smoking status: Never    Smokeless tobacco: Never   Substance Use Topics    Alcohol use: Yes     Comment: occasionally     Family History:   Family History   Problem Relation Age of Onset    Depression Mother     Other Mother     Mental Illness Mother     Stroke Mother     Breast Cancer Sister     Other Maternal Grandfather        PHYSICAL EXAM:      BP (!) 141/77   Pulse 95   Temp 96.8 °F (36 °C) (Temporal)   Resp 18   Ht 5' 3\" (1.6 m)   Wt 214 lb (97.1 kg)   SpO2 96%   BMI 37.91 kg/m²  I        Heart:   RRR, normal s1s2    Lungs:  CTA bilat,  Normal effort    Abdomen:   NT, ND      ASA Grade:  ASA 3 - Patient with moderate systemic disease with functional limitations    Mallampati Class: 2          ASSESSMENT AND PLAN:    1. Patient is a 69 y.o. female here for Colonoscopy with MAC.   2.  Procedure options, risks and benefits reviewed with patient. Patient expresses understanding.     Kemar Munoz MD,   [address]  10/14/2022

## 2022-10-14 NOTE — PROGRESS NOTES
Reviewed patient's medical and surgical history in electronic record and with patient at the bedside. All questions regarding procedure answered. Scope number and equipment verified using a two person system. Family in waiting room.     Electronically signed by Gume Cheng RN on 10/14/2022 at 9:44 AM

## 2022-10-14 NOTE — PROGRESS NOTES
Discharge instructions review with patient and . All home medications have been reviewed, pt v/u. Discharge instructions signed. Pt discharged via wheelchair. Pt discharged with discharge paperwork and all belongings.  taking stable pt home.

## 2022-10-14 NOTE — PROCEDURES
noted.  The scope was straightened, the colon was decompressed and the scope was withdrawn from the patient. The patient tolerated the procedure well and was taken to the PACU in good condition.     Impression:   - Ascending colon polyps x2 (7 to 8 mm)  -Sigmoid colon polyp x1 (6 mm)      Recommendations:   -Await pathology  -Follow-up with referring MD  -Repeat colonoscopy in 3 years if all polyps are adenomatous if not 5 years      Graciela Vides MD, MD   [facility]  10/14/2022

## 2022-11-08 ENCOUNTER — OFFICE VISIT (OUTPATIENT)
Dept: RHEUMATOLOGY | Age: 67
End: 2022-11-08
Payer: MEDICARE

## 2022-11-08 VITALS
WEIGHT: 217 LBS | DIASTOLIC BLOOD PRESSURE: 92 MMHG | HEIGHT: 63 IN | BODY MASS INDEX: 38.45 KG/M2 | SYSTOLIC BLOOD PRESSURE: 155 MMHG

## 2022-11-08 DIAGNOSIS — Z11.59 ENCOUNTER FOR SCREENING FOR OTHER VIRAL DISEASES: ICD-10-CM

## 2022-11-08 DIAGNOSIS — M15.9 PRIMARY OSTEOARTHRITIS INVOLVING MULTIPLE JOINTS: ICD-10-CM

## 2022-11-08 DIAGNOSIS — I10 PRIMARY HYPERTENSION: ICD-10-CM

## 2022-11-08 DIAGNOSIS — M19.90 INFLAMMATORY ARTHRITIS: Primary | ICD-10-CM

## 2022-11-08 PROCEDURE — 1090F PRES/ABSN URINE INCON ASSESS: CPT | Performed by: INTERNAL MEDICINE

## 2022-11-08 PROCEDURE — 3017F COLORECTAL CA SCREEN DOC REV: CPT | Performed by: INTERNAL MEDICINE

## 2022-11-08 PROCEDURE — 3074F SYST BP LT 130 MM HG: CPT | Performed by: INTERNAL MEDICINE

## 2022-11-08 PROCEDURE — G8484 FLU IMMUNIZE NO ADMIN: HCPCS | Performed by: INTERNAL MEDICINE

## 2022-11-08 PROCEDURE — G8399 PT W/DXA RESULTS DOCUMENT: HCPCS | Performed by: INTERNAL MEDICINE

## 2022-11-08 PROCEDURE — 1036F TOBACCO NON-USER: CPT | Performed by: INTERNAL MEDICINE

## 2022-11-08 PROCEDURE — G8417 CALC BMI ABV UP PARAM F/U: HCPCS | Performed by: INTERNAL MEDICINE

## 2022-11-08 PROCEDURE — 99204 OFFICE O/P NEW MOD 45 MIN: CPT | Performed by: INTERNAL MEDICINE

## 2022-11-08 PROCEDURE — 3078F DIAST BP <80 MM HG: CPT | Performed by: INTERNAL MEDICINE

## 2022-11-08 PROCEDURE — G8427 DOCREV CUR MEDS BY ELIG CLIN: HCPCS | Performed by: INTERNAL MEDICINE

## 2022-11-08 PROCEDURE — 1123F ACP DISCUSS/DSCN MKR DOCD: CPT | Performed by: INTERNAL MEDICINE

## 2022-11-08 RX ORDER — PREDNISONE 1 MG/1
TABLET ORAL
Qty: 60 TABLET | Refills: 0 | Status: SHIPPED | OUTPATIENT
Start: 2022-11-08

## 2022-11-08 NOTE — PROGRESS NOTES
2022  Patient Name: Stacey Polk  : 1955  Medical Record: [id number]      ASSESSMENT AND PLAN    Assessment/Plan:      ASSESSMENT:    1. Inflammatory arthritis    2. Primary osteoarthritis involving multiple joints    3. Encounter for screening for other viral diseases     4. Primary hypertension        PLAN:     1. Inflammatory arthritis   She was diagnosed with rheumatoid arthritis 20 years ago and she was placed on methotrexate which caused rash/allergic reaction. She has not been on any other DMARD therapy since then. She does not have any joint deformities consistent with longstanding untreated rheumatoid arthritis. She has active synovitis on joint exam raising concern for inflammatory arthritis-either rheumatoid arthritis versus psoriatic arthritis. She also has a component of osteoarthritis contributing to the joint symptoms. I will do further work-up to evaluate further for rheumatoid arthritis/systemic rheumatic disease. I will also check baseline hand x-rays, knee x-rays, hepatitis panel, CBC and CMP  I will start prednisone 15 mg daily for 10 days, 10 mg daily for 10 days and then 5 mg daily thereafter  Avoid oral NSAIDs due to history of GI bleed. Further management pending test results  -     C-Reactive Protein; Future  -     Cyclic Citrul Peptide Antibody, IgG; Future  -     Hepatitis B Core Antibody, IgM; Future  -     Hepatitis B Surface Antigen; Future  -     Hepatitis C Antibody; Future  -     XR HAND RIGHT (MIN 3 VIEWS); Future  -     XR HAND LEFT (MIN 3 VIEWS); Future  -     Sedimentation Rate; Future  -     Rheumatoid Factor; Future  -     TSH with Reflex to FT4; Future  -     Miscellaneous Sendout; Future  -     predniSONE (DELTASONE) 5 MG tablet; TAKE 3 TABS DAILY FOR 10 DAYS, 2 TABS DAILY FOR 10 DAYS, 1 TAB DAILY, Disp-60 tablet, R-0Normal  2. Primary osteoarthritis involving multiple joints  -     XR SHOULDER RIGHT (MIN 2 VIEWS);  Future  -     XR KNEE RIGHT (3 VIEWS); Future  -     XR KNEE LEFT (3 VIEWS); Future  -     Comprehensive Metabolic Panel; Future  -     CBC with Auto Differential; Future  3. Encounter for screening for other viral diseases   -     Hepatitis B Surface Antigen; Future  4. Primary hypertension  -     TSH with Reflex to FT4; Future  -     CBC with Auto Differential; Future    The patient indicates understanding of these issues and agrees with the plan. Return in about 4 weeks (around 12/6/2022). The risks and benefits of my recommendations, as well as other treatment options, benefits and side effects werediscussed with the patient. All questions were answered. I reviewed patient's history, referral documents and electronic medical records  Copy of consult note is being routedelectronically/faxed to referring physician         MEDICATIONS  Current Outpatient Medications   Medication Sig Dispense Refill    predniSONE (DELTASONE) 5 MG tablet TAKE 3 TABS DAILY FOR 10 DAYS, 2 TABS DAILY FOR 10 DAYS, 1 TAB DAILY 60 tablet 0    amLODIPine (NORVASC) 5 MG tablet TAKE 1 TABLET BY MOUTH DAILY 90 tablet 1    Handicap Placard MISC by Does not apply route Valid 9/26/2022 till 9/26/2027 1 each 0    loratadine (CLARITIN) 10 MG capsule Take 10 mg by mouth as needed      Ascorbic Acid (VITAMIN C PO) Take by mouth at bedtime      vitamin D 25 MCG (1000 UT) CAPS Take by mouth at bedtime      omeprazole 20 MG EC tablet Take 20 mg by mouth at bedtime       No current facility-administered medications for this visit. ALLERGIES  Allergies   Allergen Reactions    Penicillins Hives and Swelling    Rofecoxib Hives and Swelling    Sulfa Antibiotics Hives and Swelling    Tegaderm Ag Mesh [Silver]      Had blisters under tegaderm         Comments  No specialty comments available.     Arielle Alicea MD    HISTORY OF PRESENT ILLNESS  Joan Roman is a 69 y.o. female With past medical history of hypertension, PTSD, rheumatoid arthritis, skin cancerwho is being seen for follow up evaluation of rheumatoid arthritis. She was diagnosed with rheumatoid arthritis more than 20 years ago. She presented with joint pain, swelling and stiffness and was found to have positive blood work for rheumatoid arthritis. She remembers being on methotrexate but could not tolerate it due to severe allergic reaction [rash] she was on Vioxx for 2 weeks and then broke out in rash. She is unable to take oral NSAIDs due to history of GI bleed. She has not been on any other DMARD therapy since. She continues to complain of pain in the joints. She has pain in her hands, right shoulder, feet, knees on daily basis. Pain is worse at night. She has difficulty opening doorknobs and jars. She has swelling in the MCP, PIP joints, feet and knees. She has stiffness in the morning lasting for 30 minutes to 1 hour. Both knees crack and pop. She had left knee ACL repair and right knee meniscal repair. She also has difficulty doing other activities, reaching behind her back with the right shoulder. Her maternal aunt had rheumatoid arthritis. She denies psoriasis, inflammatory bowel disease, inflammatory back pain, dactylitis, enthesitis, tenosynovitis and uveitis. She denies fever, weight loss and swollen glands. HPI  Review of Systems    REVIEW OF SYSTEMS: Positive for dry mouth, fatigue  Constitutional: No unanticipated weight loss or fevers. Integumentary: No rash, photosensitivity, malar rash, livedo reticularis, alopecia and Raynaud's symptoms, sclerodactyly, skin tightening  Eyes: negative for visual disturbance and persistent redness, discharge from eyes   ENT: - No tinnitus, loss of hearing, vertigo, or recurrent ear infections.  - No history of nasal/oral ulcers. - No history of dry eyes  Cardiovascular: No history of pericarditis, chest pain or murmur or palpitations  Respiratory: No shortness of breath, cough or history of interstitial lung disease. No history of pleurisy. No history of tuberculosis or atypical infections. Gastrointestinal: No history of heart burn, dysphagia or esophageal dysmotility. No any inflammatory bowel disease. Genitourinary: No history of renal disease, miscarriages. Hematologic/Lymphatic: No abnormal bruising or bleeding, blood clots or swollen lymph nodes. Neurological: No history of headaches, seizure or focal weakness. No history of neuropathies, paresthesias or hyperesthesias, facial droop, diplopia  Psychiatric: No history of bipolar disease  Endocrine: Denies any polyuria, polydipsia and osteoporosis  Allergic/Immunologic: No nasal congestion or hives. I have reviewed patients Past medical History, Social History and Family History as mentioned in her chart and this remains unchanged fromprevious.     Past Medical History:   Diagnosis Date    Food poisoning 04/2022    resulting in dehydration with SHEELA    Hypertension     PTSD (post-traumatic stress disorder)     Rheumatoid arthritis ([facility] [address]     Salmonella 04/2022    Skin cancer      Past Surgical History:   Procedure Laterality Date    ANTERIOR CRUCIATE LIGAMENT REPAIR      Left knee    COLONOSCOPY N/A 6/16/2022    COLONOSCOPY DIAGNOSTIC performed by Mariama Aponte MD at [place] [address] N/A 10/14/2022    COLONOSCOPY POLYPECTOMY SNARE/COLD BIOPSY performed by Mariama Aponte MD at [place] [address]  10/14/2022    COLONOSCOPY WITH BIOPSY performed by Mariama Aponte MD at [address] [place] [address] Rd., [address]      Right knee    MOHS SURGERY Right     nose with skin graft from cheek    TUBAL LIGATION Bilateral     UPPER GASTROINTESTINAL ENDOSCOPY N/A 6/16/2022    EGD BIOPSY performed by Mariama Aponte MD at [facility] Right 6/20/2022    US BREAST NEEDLE BIOPSY RIGHT 6/20/2022 [facility] ULTRASOUND    VENTRAL HERNIA REPAIR N/A 7/11/2022    LAPAROSCOPIC REPAIR OF INCARCERATED INCISIONAL HERNIA performed by Ailyn Mitchell Faustina Ivey MD at [address] [place] [address] History     Socioeconomic History    Marital status:      Spouse name: Gregorio Rose    Number of children: 2    Years of education: Not on file    Highest education level: Not on file   Occupational History    Not on file   Tobacco Use    Smoking status: Never    Smokeless tobacco: Never   Vaping Use    Vaping Use: Never used   Substance and Sexual Activity    Alcohol use: Yes     Comment: occasionally    Drug use: Never    Sexual activity: Not Currently   Other Topics Concern    Not on file   Social History Narrative    Not on file     Social Determinants of Health     Financial Resource Strain: Low Risk     Difficulty of Paying Living Expenses: Not hard at all   Food Insecurity: No Food Insecurity    Worried About Running Out of Food in the Last Year: Never true    Ran Out of Food in the Last Year: Never true   Transportation Needs: Not on file   Physical Activity: Insufficiently Active    Days of Exercise per Week: 2 days    Minutes of Exercise per Session: 10 min   Stress: Not on file   Social Connections: Not on file   Intimate Partner Violence: Not on file   Housing Stability: Not on file     Family History   Problem Relation Age of Onset    Depression Mother     Other Mother     Mental Illness Mother     Stroke Mother     Breast Cancer Sister     Other Maternal Grandfather          PHYSICAL EXAM   Vitals:    11/08/22 1349   BP: (!) 155/92   Site: Right Upper Arm   Position: Sitting   Cuff Size: Large Adult   Weight: 217 lb (98.4 kg)   Height: 5' 3\" (1.6 m)     Physical Exam  Constitutional:  Well developed, well nourished, no acute distress, non-toxic appearance   Musculoskeletal:    RIGHT  Swell  Tender  ROM  LEFT  Swell  Tender  ROM    DIP2  0  0  Heberden  0  0  Heberden   DIP3  0  0  Heberden  0  0  Heberden   DIP4  0  0  Heberden  0  0  Heberden   DIP5  0  0  Heberden  0  0  Heberden   PIP1  0  0  Bony change  0  0  Bony change   PIP2  + + FULL   0  0  FULL PIP3  0  0  FULL   0  0  FULL    PIP4  0  0  FULL   0  0  FULL    PIP5  0  0  FULL   0  0  FULL    MCP1  0  0  FULL   0  0  FULL    MCP2  ++ ++ FULL   ++ ++ FULL    MCP3  ++ ++ FULL   ++ ++ FULL    MCP4  0  0  FULL   0  0  FULL    MCP5  0  0  FULL   0  0  FULL    Wrist  + + FULL   + + FULL    Elbow  0  0  FULL   0  0  FULL    Shouldr  0  0  decr  0  0  FULL    Hip  0  0  FULL   0  0  FULL    Knee  0  0  Crepitus++/varus++  0  0  Crepitus+++/varus++   Ankle  0  0  FULL   0  0  FULL    MTP1  0  + FULL   0  + FULL    MTP2  0  + FULL   0  + FULL    MTP3  0  + FULL   0  + FULL    MTP4  0  + FULL   0  + FULL    MTP5  0  + FULL   0  + FULL    IP1  0  0  FULL   0  0  FULL    IP2  0  0  FULL   0  0  FULL    IP3  0  0  FULL   0  0  FULL    IP4  0  0  FULL   0  0  FULL    IP5  0  0  FULL   0 0 FULL      Squaring of bilateral CMC joints  Decreased abduction, internal and external rotation in the right shoulder  Decreased flexion in bilateral wrists  Ambulates without assistance, normal gait  Neck: Full ROM, no tenderness,supple   Back- no tenderness. Eyes:  PERRL, extra ocular movements intact, conjunctiva normal   HEENT:  Atraumatic, normocephalic, external ears normal, oropharynx moist, no pharyngeal exudates. Respiratory:  No respiratory distress  GI:  Soft, nondistended, normal bowel sounds, nontender, noorganomegaly, no mass, no rebound, no guarding   :  No costovertebral angle tenderness   Integument:  Well hydrated, no rash or telangiectasias  Lymphatic:  No lymphadenopathy noted   Neurologic:   Alert & oriented x 3, CN 2-12 normal, no focal deficits noted. Sensations Intact. Muscle strength 5/5 proximally and distally in upper and lower extremities.    Psychiatric:  Speech and behavior appropriate           LABS AND IMAGING  Outside data reviewed and in HPI    Lab Results   Component Value Date/Time    WBC 10.6 09/23/2022 10:23 AM    RBC 5.11 09/23/2022 10:23 AM    HGB 13.9 09/23/2022 10:23 AM    HCT 42.8 09/23/2022 10:23 AM     09/23/2022 10:23 AM    MCV 83.8 09/23/2022 10:23 AM    MCH 27.2 09/23/2022 10:23 AM    MCHC 32.5 09/23/2022 10:23 AM    RDW 15.1 09/23/2022 10:23 AM    LYMPHOPCT 10.6 04/02/2022 10:21 AM    MONOPCT 7.8 04/02/2022 10:21 AM    BASOPCT 0.2 04/02/2022 10:21 AM    MONOSABS 1.0 04/02/2022 10:21 AM    LYMPHSABS 1.4 04/02/2022 10:21 AM    EOSABS 0.0 04/02/2022 10:21 AM    BASOSABS 0.0 04/02/2022 10:21 AM       Chemistry        Component Value Date/Time     09/23/2022 1023    K 4.5 09/23/2022 1023    K 3.9 04/07/2022 0748     09/23/2022 1023    CO2 22 09/23/2022 1023    BUN 18 09/23/2022 1023    CREATININE 1.1 09/23/2022 1023        Component Value Date/Time    CALCIUM 10.0 09/23/2022 1023    ALKPHOS 97 08/04/2022 1208    AST 13 (L) 08/04/2022 1208    ALT 9 (L) 08/04/2022 1208    BILITOT 0.6 08/04/2022 1208          No results found for: SEDRATE  No results found for: CRP  No results found for: PAULINO, LUIS, SSA, SSB, C3, C4  No results found for: RF, CCPABIGG  No results found for: PAULINO, ANATITER, ANAINT, PATH  No results found for: DSDNAG, DSDNAIGGIFA  No results found for: SSAROAB, SSALAAB  No results found for: SMAB, RNPAB  No results found for: CENTABIGG  No results found for: C3, C4, ACE  No results found for: JO1, VITD25, [id number]  No results found for: Daron Lory  No results found for: Lendon Dove  Lab Results   Component Value Date    TSHFT4 2.73 03/28/2022     No results found for: VITD25    Radiology:        ######################################################################    I thank you for giving me theopportunity to participate in [address]. If you have any questions or concerns please feel free to contact me. I look forward to following  Hortensia Staggers along with you. Electronically signed by: Conrad Navas MD, MD, 11/8/2022 2:22 PM    Documentation was done using voice recognition dragon software.   Every effort was made to ensure accuracy;however, inadvertent unintentional computerized transcription errors may be present.

## 2022-11-08 NOTE — LETTER
[facility]  [address]  [place] [address]  Phone: [phone]  Fax: [phone]    Kate Edward MD    November 8, 2022     Angie Mejia. [facility] [address]    Patient: Vonda Tidwell   MR Number: [id number]   YOB: 1955   Date of Visit: 11/8/2022       Dear Chip Lou:    Thank you for referring Monet Huff to me for evaluation/treatment. Below are the relevant portions of my assessment and plan of care. If you have questions, please do not hesitate to call me. I look forward to following Lydia Wyatt along with you.     Sincerely,      Kate Edward MD

## 2022-11-09 ENCOUNTER — HOSPITAL ENCOUNTER (OUTPATIENT)
Age: 67
Discharge: HOME | End: 2022-11-09
Payer: MEDICARE

## 2022-11-09 ENCOUNTER — HOSPITAL ENCOUNTER (OUTPATIENT)
Dept: GENERAL RADIOLOGY | Age: 67
Discharge: HOME | End: 2022-11-09
Payer: MEDICARE

## 2022-11-09 DIAGNOSIS — M19.90 INFLAMMATORY ARTHRITIS: ICD-10-CM

## 2022-11-09 DIAGNOSIS — Z11.59 ENCOUNTER FOR SCREENING FOR OTHER VIRAL DISEASES: ICD-10-CM

## 2022-11-09 DIAGNOSIS — M15.9 PRIMARY OSTEOARTHRITIS INVOLVING MULTIPLE JOINTS: ICD-10-CM

## 2022-11-09 DIAGNOSIS — I10 PRIMARY HYPERTENSION: ICD-10-CM

## 2022-11-09 LAB
A/G RATIO: 1.5 (ref 1.1–2.2)
ALBUMIN SERPL-MCNC: 4.5 G/DL (ref 3.4–5)
ALP BLD-CCNC: 101 U/L (ref 40–129)
ALT SERPL-CCNC: 13 U/L (ref 10–40)
ANION GAP SERPL CALCULATED.3IONS-SCNC: 12 MMOL/L (ref 3–16)
AST SERPL-CCNC: 14 U/L (ref 15–37)
BASOPHILS ABSOLUTE: 0.1 K/UL (ref 0–0.2)
BASOPHILS RELATIVE PERCENT: 1 %
BILIRUB SERPL-MCNC: 0.5 MG/DL (ref 0–1)
BUN BLDV-MCNC: 20 MG/DL (ref 7–20)
C-REACTIVE PROTEIN: 20.8 MG/L (ref 0–5.1)
CALCIUM SERPL-MCNC: 10.3 MG/DL (ref 8.3–10.6)
CHLORIDE BLD-SCNC: 102 MMOL/L (ref 99–110)
CO2: 25 MMOL/L (ref 21–32)
CREAT SERPL-MCNC: 0.9 MG/DL (ref 0.6–1.2)
EOSINOPHILS ABSOLUTE: 0 K/UL (ref 0–0.6)
EOSINOPHILS RELATIVE PERCENT: 0.3 %
GFR SERPL CREATININE-BSD FRML MDRD: >60 ML/MIN/{1.73_M2}
GLUCOSE BLD-MCNC: 89 MG/DL (ref 70–99)
HCT VFR BLD CALC: 43 % (ref 36–48)
HEMOGLOBIN: 14.2 G/DL (ref 12–16)
HEPATITIS B CORE IGM ANTIBODY: NORMAL
HEPATITIS B SURFACE ANTIGEN INTERPRETATION: NORMAL
HEPATITIS C ANTIBODY INTERPRETATION: NORMAL
LYMPHOCYTES ABSOLUTE: 2.1 K/UL (ref 1–5.1)
LYMPHOCYTES RELATIVE PERCENT: 17 %
MCH RBC QN AUTO: 26.9 PG (ref 26–34)
MCHC RBC AUTO-ENTMCNC: 32.9 G/DL (ref 31–36)
MCV RBC AUTO: 81.7 FL (ref 80–100)
MONOCYTES ABSOLUTE: 0.5 K/UL (ref 0–1.3)
MONOCYTES RELATIVE PERCENT: 4.2 %
NEUTROPHILS ABSOLUTE: 9.8 K/UL (ref 1.7–7.7)
NEUTROPHILS RELATIVE PERCENT: 77.5 %
PDW BLD-RTO: 15.5 % (ref 12.4–15.4)
PLATELET # BLD: 302 K/UL (ref 135–450)
PMV BLD AUTO: 8.8 FL (ref 5–10.5)
POTASSIUM SERPL-SCNC: 4 MMOL/L (ref 3.5–5.1)
RBC # BLD: 5.26 M/UL (ref 4–5.2)
RHEUMATOID FACTOR: <10 IU/ML
SEDIMENTATION RATE, ERYTHROCYTE: 43 MM/HR (ref 0–30)
SODIUM BLD-SCNC: 139 MMOL/L (ref 136–145)
TOTAL PROTEIN: 7.5 G/DL (ref 6.4–8.2)
TSH REFLEX FT4: 2.84 UIU/ML (ref 0.27–4.2)
WBC # BLD: 12.6 K/UL (ref 4–11)

## 2022-11-09 PROCEDURE — 85025 COMPLETE CBC W/AUTO DIFF WBC: CPT

## 2022-11-09 PROCEDURE — 80053 COMPREHEN METABOLIC PANEL: CPT

## 2022-11-09 PROCEDURE — 73130 X-RAY EXAM OF HAND: CPT

## 2022-11-09 PROCEDURE — 87340 HEPATITIS B SURFACE AG IA: CPT

## 2022-11-09 PROCEDURE — 86140 C-REACTIVE PROTEIN: CPT

## 2022-11-09 PROCEDURE — 86200 CCP ANTIBODY: CPT

## 2022-11-09 PROCEDURE — 73562 X-RAY EXAM OF KNEE 3: CPT

## 2022-11-09 PROCEDURE — 73030 X-RAY EXAM OF SHOULDER: CPT

## 2022-11-09 PROCEDURE — 86431 RHEUMATOID FACTOR QUANT: CPT

## 2022-11-09 PROCEDURE — 86039 ANTINUCLEAR ANTIBODIES (ANA): CPT

## 2022-11-09 PROCEDURE — 85652 RBC SED RATE AUTOMATED: CPT

## 2022-11-09 PROCEDURE — 36415 COLL VENOUS BLD VENIPUNCTURE: CPT

## 2022-11-09 PROCEDURE — 84443 ASSAY THYROID STIM HORMONE: CPT

## 2022-11-09 PROCEDURE — 86803 HEPATITIS C AB TEST: CPT

## 2022-11-09 PROCEDURE — 86705 HEP B CORE ANTIBODY IGM: CPT

## 2022-11-10 LAB — CYCLIC CITRULLINATED PEPTIDE ANTIBODY IGG: <0.5 U/ML (ref 0–2.9)

## 2022-11-12 LAB
ANA PATTERN: ABNORMAL
ANA TITER: ABNORMAL
ANTINUCLEAR AB INTERPRETIVE COMMENT: ABNORMAL
ANTINUCLEAR ANTIBODY, HEP-2, IGG: DETECTED

## 2022-11-14 DIAGNOSIS — R76.8 POSITIVE ANA (ANTINUCLEAR ANTIBODY): Primary | ICD-10-CM

## 2022-11-16 DIAGNOSIS — M19.90 INFLAMMATORY ARTHRITIS: ICD-10-CM

## 2022-11-16 DIAGNOSIS — R76.8 POSITIVE ANA (ANTINUCLEAR ANTIBODY): ICD-10-CM

## 2022-11-17 LAB
ANTI-DSDNA IGG: <1 IU/ML (ref 0–9)
ANTI-RNP IGG: 0.3 AI (ref 0–0.9)
ANTI-SMITH IGG: <0.2 AI (ref 0–0.9)
ANTI-SS-A IGG: <0.2 AI (ref 0–0.9)
ANTI-SS-B IGG: <0.2 AI (ref 0–0.9)

## 2022-12-06 DIAGNOSIS — Z87.448 HISTORY OF ACUTE RENAL FAILURE: ICD-10-CM

## 2022-12-06 LAB
ALBUMIN SERPL-MCNC: 4.3 G/DL (ref 3.4–5)
ANION GAP SERPL CALCULATED.3IONS-SCNC: 11 MMOL/L (ref 3–16)
BUN BLDV-MCNC: 20 MG/DL (ref 7–20)
CALCIUM SERPL-MCNC: 10.1 MG/DL (ref 8.3–10.6)
CHLORIDE BLD-SCNC: 101 MMOL/L (ref 99–110)
CO2: 27 MMOL/L (ref 21–32)
CREAT SERPL-MCNC: 0.9 MG/DL (ref 0.6–1.2)
GFR SERPL CREATININE-BSD FRML MDRD: >60 ML/MIN/{1.73_M2}
GLUCOSE BLD-MCNC: 98 MG/DL (ref 70–99)
HCT VFR BLD CALC: 44.2 % (ref 36–48)
HEMOGLOBIN: 14.2 G/DL (ref 12–16)
MAGNESIUM: 1.8 MG/DL (ref 1.8–2.4)
MCH RBC QN AUTO: 27.3 PG (ref 26–34)
MCHC RBC AUTO-ENTMCNC: 32.2 G/DL (ref 31–36)
MCV RBC AUTO: 84.9 FL (ref 80–100)
PDW BLD-RTO: 15.5 % (ref 12.4–15.4)
PHOSPHORUS: 4 MG/DL (ref 2.5–4.9)
PLATELET # BLD: 290 K/UL (ref 135–450)
PMV BLD AUTO: 8.4 FL (ref 5–10.5)
POTASSIUM SERPL-SCNC: 4.8 MMOL/L (ref 3.5–5.1)
RBC # BLD: 5.2 M/UL (ref 4–5.2)
SODIUM BLD-SCNC: 139 MMOL/L (ref 136–145)
WBC # BLD: 11.4 K/UL (ref 4–11)

## 2022-12-14 ENCOUNTER — OFFICE VISIT (OUTPATIENT)
Dept: RHEUMATOLOGY | Age: 67
End: 2022-12-14
Payer: MEDICARE

## 2022-12-14 VITALS — WEIGHT: 224 LBS | BODY MASS INDEX: 39.68 KG/M2 | DIASTOLIC BLOOD PRESSURE: 80 MMHG | SYSTOLIC BLOOD PRESSURE: 138 MMHG

## 2022-12-14 DIAGNOSIS — M15.9 PRIMARY OSTEOARTHRITIS INVOLVING MULTIPLE JOINTS: ICD-10-CM

## 2022-12-14 DIAGNOSIS — M19.90 INFLAMMATORY ARTHRITIS: ICD-10-CM

## 2022-12-14 DIAGNOSIS — Z79.899 HIGH RISK MEDICATION USE: ICD-10-CM

## 2022-12-14 DIAGNOSIS — R76.8 POSITIVE ANA (ANTINUCLEAR ANTIBODY): ICD-10-CM

## 2022-12-14 DIAGNOSIS — M19.90 INFLAMMATORY ARTHRITIS: Primary | ICD-10-CM

## 2022-12-14 PROCEDURE — 3017F COLORECTAL CA SCREEN DOC REV: CPT | Performed by: INTERNAL MEDICINE

## 2022-12-14 PROCEDURE — 1090F PRES/ABSN URINE INCON ASSESS: CPT | Performed by: INTERNAL MEDICINE

## 2022-12-14 PROCEDURE — G8417 CALC BMI ABV UP PARAM F/U: HCPCS | Performed by: INTERNAL MEDICINE

## 2022-12-14 PROCEDURE — 3074F SYST BP LT 130 MM HG: CPT | Performed by: INTERNAL MEDICINE

## 2022-12-14 PROCEDURE — 3078F DIAST BP <80 MM HG: CPT | Performed by: INTERNAL MEDICINE

## 2022-12-14 PROCEDURE — 1123F ACP DISCUSS/DSCN MKR DOCD: CPT | Performed by: INTERNAL MEDICINE

## 2022-12-14 PROCEDURE — G8484 FLU IMMUNIZE NO ADMIN: HCPCS | Performed by: INTERNAL MEDICINE

## 2022-12-14 PROCEDURE — G8427 DOCREV CUR MEDS BY ELIG CLIN: HCPCS | Performed by: INTERNAL MEDICINE

## 2022-12-14 PROCEDURE — 1036F TOBACCO NON-USER: CPT | Performed by: INTERNAL MEDICINE

## 2022-12-14 PROCEDURE — G8399 PT W/DXA RESULTS DOCUMENT: HCPCS | Performed by: INTERNAL MEDICINE

## 2022-12-14 PROCEDURE — 99214 OFFICE O/P EST MOD 30 MIN: CPT | Performed by: INTERNAL MEDICINE

## 2022-12-14 RX ORDER — LEFLUNOMIDE 20 MG/1
20 TABLET ORAL DAILY
Qty: 30 TABLET | Refills: 1 | Status: SHIPPED | OUTPATIENT
Start: 2022-12-14

## 2022-12-14 RX ORDER — LEFLUNOMIDE 20 MG/1
20 TABLET ORAL DAILY
Qty: 90 TABLET | OUTPATIENT
Start: 2022-12-14

## 2022-12-14 RX ORDER — PREDNISONE 1 MG/1
TABLET ORAL
Qty: 50 TABLET | Refills: 0 | Status: SHIPPED | OUTPATIENT
Start: 2022-12-14

## 2022-12-14 NOTE — PROGRESS NOTES
2022  Patient Name: Jay Valerio  : 1955  Medical Record: [id number]      ASSESSMENT AND PLAN    Assessment/Plan:      ASSESSMENT:    1. Inflammatory arthritis    2. Primary osteoarthritis involving multiple joints    3. Positive PAULINO (antinuclear antibody)    4. High risk medication use        PLAN:     1. Inflammatory arthritis   She was diagnosed with rheumatoid arthritis 20 years ago and she was placed on methotrexate which caused rash/allergic reaction. She has not been on any other DMARD therapy since then. RF, CCP negative, elevated ESR and CRP, hepatitis panel negative. Hand x-rays with mild degenerative changes. Knee and shoulder x-rays with severe degenerative changes. She has active synovitis on joint exam.  She reports improvement with prednisone. Joint symptoms have worsened off of prednisone  She is unable to take oral NSAIDs due to history of GI bleed. I will start leflunomide 20 mg daily  I will restart prednisone 10 mg daily for 10 days, 7.5 mg daily for 10 days, 5 mg daily for 10 days, 5 mg every other day for 10 days and then stop  Labs every 4 weeks. 2. Primary osteoarthritis involving multiple joints    Severe tricompartmental osteoarthritis in both knees and severe glenohumeral osteoarthritis in the right shoulder. Continues to be symptomatic. Cannot take oral NSAIDs due to GI bleed. Advised to start topical diclofenac gel, Tylenol as needed and refer to physical therapy. If there is no improvement I will consider doing corticosteroid injections. 3 high risk medication use-  Leflunomide can cause nausea/vomiting, diarrhea, elevated liver enzymes, bone marrow toxicity, rare cases of interstitial lung diseases, birth defects and were explained in detail to the patient. We have to check blood work every month for first 3 months and then every 2-3 months   Labs every 4 weeks. Up-to-date with flu and COVID booster vaccine.   Recommend pneumonia and shingles vaccine. The patient indicates understanding of these issues and agrees with the plan. Return in about 8 weeks (around 2/8/2023). The risks and benefits of my recommendations, as well as other treatment options, benefits and side effects werediscussed with the patient. All questions were answered. MEDICATIONS  Current Outpatient Medications   Medication Sig Dispense Refill    leflunomide (ARAVA) 20 MG tablet Take 1 tablet by mouth daily 30 tablet 1    predniSONE (DELTASONE) 5 MG tablet Take 2 tabs daily for 10 days, 1.5 tab daily for 10 days, 1 tab daily for 10 days, 1 tab every other day for 10 days and stop 50 tablet 0    amLODIPine (NORVASC) 10 MG tablet Take 1 tablet by mouth daily 30 tablet 3    losartan (COZAAR) 50 MG tablet Take 1 tablet by mouth daily 30 tablet 3    Handicap Placard MISC by Does not apply route Valid 9/26/2022 till 9/26/2027 1 each 0    loratadine (CLARITIN) 10 MG capsule Take 10 mg by mouth as needed      Ascorbic Acid (VITAMIN C PO) Take by mouth at bedtime      vitamin D 25 MCG (1000 UT) CAPS Take by mouth at bedtime      omeprazole 20 MG EC tablet Take 20 mg by mouth at bedtime       No current facility-administered medications for this visit. ALLERGIES  Allergies   Allergen Reactions    Penicillins Hives and Swelling    Rofecoxib Hives and Swelling    Sulfa Antibiotics Hives and Swelling    Tegaderm Ag Mesh [Silver]      Had blisters under tegaderm         Comments  No specialty comments available. Background history:  William Portillo is a 69 y.o. female With past medical history of hypertension, PTSD, rheumatoid arthritis, skin cancerwho is being seen for follow up evaluation of rheumatoid arthritis. She was diagnosed with rheumatoid arthritis more than 20 years ago. She presented with joint pain, swelling and stiffness and was found to have positive blood work for rheumatoid arthritis.   She remembers being on methotrexate but could not tolerate it due to severe allergic reaction [rash] she was on Vioxx for 2 weeks and then broke out in rash. She is unable to take oral NSAIDs due to history of GI bleed. She has not been on any other DMARD therapy since. She continues to complain of pain in the joints. She has pain in her hands, right shoulder, feet, knees on daily basis. Pain is worse at night. She has difficulty opening doorknobs and jars. She has swelling in the MCP, PIP joints, feet and knees. She has stiffness in the morning lasting for 30 minutes to 1 hour. Both knees crack and pop. She had left knee ACL repair and right knee meniscal repair. She also has difficulty doing other activities, reaching behind her back with the right shoulder. Her maternal aunt had rheumatoid arthritis. She denies psoriasis, inflammatory bowel disease, inflammatory back pain, dactylitis, enthesitis, tenosynovitis and uveitis. She denies fever, weight loss and swollen glands. Interim history: She presents for follow-up of inflammatory arthritis, osteoarthritis. She is off of prednisone. She reports significant improvement in joint pain, swelling and stiffness on prednisone. Joint symptoms have worsened off of prednisone especially in her hands. She was previously on methotrexate for rheumatoid arthritis which she could not tolerate due to severe allergic reaction. She is unable to take oral NSAIDs due to history of GI bleed. Blood work showed negative RF, CCP. ESR and CRP is elevated. Hand x-ray show mild degenerative changes. Knee and shoulder x-rays show severe degenerative changes. HPI  Review of Systems    REVIEW OF SYSTEMS: Positive for dry mouth, fatigue  Constitutional: No unanticipated weight loss or fevers.    Integumentary: No rash, photosensitivity, malar rash, livedo reticularis, alopecia and Raynaud's symptoms, sclerodactyly, skin tightening  Eyes: negative for visual disturbance and persistent redness, discharge from eyes   ENT: - No tinnitus, loss of hearing, vertigo, or recurrent ear infections.  - No history of nasal/oral ulcers. - No history of dry eyes  Cardiovascular: No history of pericarditis, chest pain or murmur or palpitations  Respiratory: No shortness of breath, cough or history of interstitial lung disease. No history of pleurisy. No history of tuberculosis or atypical infections. Gastrointestinal: No history of heart burn, dysphagia or esophageal dysmotility. No any inflammatory bowel disease. Genitourinary: No history of renal disease, miscarriages. Hematologic/Lymphatic: No abnormal bruising or bleeding, blood clots or swollen lymph nodes. Neurological: No history of headaches, seizure or focal weakness. No history of neuropathies, paresthesias or hyperesthesias, facial droop, diplopia  Psychiatric: No history of bipolar disease  Endocrine: Denies any polyuria, polydipsia and osteoporosis  Allergic/Immunologic: No nasal congestion or hives. I have reviewed patients Past medical History, Social History and Family History as mentioned in her chart and this remains unchanged fromprevious.     Past Medical History:   Diagnosis Date    Food poisoning 04/2022    resulting in dehydration with SHEELA    Hypertension     PTSD (post-traumatic stress disorder)     Rheumatoid arthritis ([facility] [address]     Salmonella 04/2022    Skin cancer      Past Surgical History:   Procedure Laterality Date    ANTERIOR CRUCIATE LIGAMENT REPAIR      Left knee    COLONOSCOPY N/A 6/16/2022    COLONOSCOPY DIAGNOSTIC performed by Margoth Willis MD at [address] [place] [address] N/A 10/14/2022    COLONOSCOPY POLYPECTOMY SNARE/COLD BIOPSY performed by Margoth Willis MD at [address] [place] [address]  10/14/2022    COLONOSCOPY WITH BIOPSY performed by Margoth Willis MD at [address] [place] [address] Rd., [address]      Right knee    MOHS SURGERY Right     nose with skin graft from cheek    TUBAL LIGATION Bilateral     UPPER GASTROINTESTINAL ENDOSCOPY N/A 6/16/2022    EGD BIOPSY performed by Beata Argueta MD at [address] RIGHT Right 6/20/2022    US BREAST NEEDLE BIOPSY RIGHT 6/20/2022 [facility] ULTRASOUND    VENTRAL HERNIA REPAIR N/A 7/11/2022    LAPAROSCOPIC REPAIR OF INCARCERATED INCISIONAL HERNIA performed by Brooke Fishman MD at [address] History     Socioeconomic History    Marital status:      Spouse name: Mei Hsu    Number of children: 2    Years of education: Not on file    Highest education level: Not on file   Occupational History    Not on file   Tobacco Use    Smoking status: Never    Smokeless tobacco: Never   Vaping Use    Vaping Use: Never used   Substance and Sexual Activity    Alcohol use: Yes     Comment: occasionally    Drug use: Never    Sexual activity: Not Currently   Other Topics Concern    Not on file   Social History Narrative    Not on file     Social Determinants of Health     Financial Resource Strain: Low Risk     Difficulty of Paying Living Expenses: Not hard at all   Food Insecurity: No Food Insecurity    Worried About Running Out of Food in the Last Year: Never true    [address] [place] [address] Place of Food in the Last Year: Never true   Transportation Needs: Not on file   Physical Activity: Insufficiently Active    Days of Exercise per Week: 2 days    Minutes of Exercise per Session: 10 min   Stress: Not on file   Social Connections: Not on file   Intimate Partner Violence: Not on file   Housing Stability: Not on file     Family History   Problem Relation Age of Onset    Depression Mother     Other Mother     Mental Illness Mother     Stroke Mother     Breast Cancer Sister     Other Maternal Grandfather          PHYSICAL EXAM   Vitals:    12/14/22 1518   BP: 138/80   Site: Right Lower Arm   Position: Sitting   Cuff Size: Large Adult   Weight: 224 lb (101.6 kg)     Physical Exam  Constitutional:  Well developed, well nourished, no acute distress, non-toxic appearance   Musculoskeletal: RIGHT  Swell  Tender  ROM  LEFT  Swell  Tender  ROM    DIP2  0  0  Heberden  0  0  Heberden   DIP3  0  0  Heberden  0  0  Heberden   DIP4  0  0  Heberden  0  0  Heberden   DIP5  0  0  Heberden  0  0  Heberden   PIP1  0  0  Bony change  0  0  Bony change   PIP2  + + FULL   0  0  FULL    PIP3  + + FULL   0  0  FULL    PIP4  + + FULL   0  0  FULL    PIP5  0  0  FULL   0  0  FULL    MCP1  0  0  FULL   0  0  FULL    MCP2  ++ ++ FULL   ++ ++ FULL    MCP3  ++ ++ FULL   ++ ++ FULL    MCP4  0  0  FULL   0  0  FULL    MCP5  0  0  FULL   0  0  FULL    Wrist  + + FULL   + + FULL    Elbow  0  0  FULL   0  0  FULL    Shouldr  0  0  decr  0  0  FULL    Hip  0  0  FULL   0  0  FULL    Knee  0  0  Crepitus++/varus++  0  0  Crepitus+++/varus++   Ankle  0  0  FULL   0  0  FULL    MTP1  0  + FULL   0  + FULL    MTP2  0  + FULL   0  + FULL    MTP3  0  + FULL   0  + FULL    MTP4  0  + FULL   0  + FULL    MTP5  0  + FULL   0  + FULL    IP1  0  0  FULL   0  0  FULL    IP2  0  0  FULL   0  0  FULL    IP3  0  0  FULL   0  0  FULL    IP4  0  0  FULL   0  0  FULL    IP5  0  0  FULL   0 0 FULL      Squaring of bilateral CMC joints  Decreased abduction, internal and external rotation in the right shoulder  Decreased flexion in bilateral wrists  Ambulates without assistance, normal gait  Neck: Full ROM, no tenderness,supple   Back- no tenderness. Eyes:  PERRL, extra ocular movements intact, conjunctiva normal   HEENT:  Atraumatic, normocephalic, external ears normal, oropharynx moist, no pharyngeal exudates. Respiratory:  No respiratory distress  GI:  Soft, nondistended, normal bowel sounds, nontender, noorganomegaly, no mass, no rebound, no guarding   :  No costovertebral angle tenderness   Integument:  Well hydrated, no rash or telangiectasias  Lymphatic:  No lymphadenopathy noted   Neurologic:   Alert & oriented x 3, CN 2-12 normal, no focal deficits noted. Sensations Intact.  Muscle strength 5/5 proximally and distally in upper and lower extremities.    Psychiatric:  Speech and behavior appropriate           LABS AND IMAGING  Outside data reviewed and in HPI    Lab Results   Component Value Date/Time    WBC 11.4 12/06/2022 09:42 AM    RBC 5.20 12/06/2022 09:42 AM    HGB 14.2 12/06/2022 09:42 AM    HCT 44.2 12/06/2022 09:42 AM     12/06/2022 09:42 AM    MCV 84.9 12/06/2022 09:42 AM    MCH 27.3 12/06/2022 09:42 AM    MCHC 32.2 12/06/2022 09:42 AM    RDW 15.5 12/06/2022 09:42 AM    LYMPHOPCT 17.0 11/09/2022 11:40 AM    MONOPCT 4.2 11/09/2022 11:40 AM    BASOPCT 1.0 11/09/2022 11:40 AM    MONOSABS 0.5 11/09/2022 11:40 AM    LYMPHSABS 2.1 11/09/2022 11:40 AM    EOSABS 0.0 11/09/2022 11:40 AM    BASOSABS 0.1 11/09/2022 11:40 AM       Chemistry        Component Value Date/Time     12/06/2022 0942    K 4.8 12/06/2022 0942    K 3.9 04/07/2022 0748     12/06/2022 0942    CO2 27 12/06/2022 0942    BUN 20 12/06/2022 0942    CREATININE 0.9 12/06/2022 0942        Component Value Date/Time    CALCIUM 10.1 12/06/2022 0942    ALKPHOS 101 11/09/2022 1140    AST 14 (L) 11/09/2022 1140    ALT 13 11/09/2022 1140    BILITOT 0.5 11/09/2022 1140          Lab Results   Component Value Date    SEDRATE 43 (H) 11/09/2022     Lab Results   Component Value Date    CRP 20.8 (H) 11/09/2022     No results found for: PAULINO, LUIS, SSA, SSB, C3, C4  Lab Results   Component Value Date/Time    RF <10.0 11/09/2022 11:40 AM     Lab Results   Component Value Date/Time    ANATITER 1:80 11/16/2022 12:27 PM     No results found for: DSDNAG, DSDNAIGGIFA  No results found for: SSAROAB, SSALAAB  No results found for: SMAB, RNPAB  No results found for: CENTABIGG  No results found for: C3, C4, ACE  No results found for: Martene Clarion, [id number]  No results found for: Migdalia Hoyles  No results found for: Taina Fair  Lab Results   Component Value Date    TSHFT4 2.84 11/09/2022     No results found for: VITD25    Radiology: Bilateral hand, knee and right shoulder x-ray 11/9/2022  Right hand:       1. Mild osteoarthrosis. 2. No acute fracture or dislocation. Left hand:       1. Mild osteoarthrosis. 2. No acute fracture or dislocation. 1. Severe tricompartmental osteoarthritis of the right knee. 2. Severe tricompartmental osteoarthritis of the left knee. 3. No acute osseous abnormality. Impression   Severe degenerative change at the glenohumeral joint       ######################################################################    I thank you for giving me theopportunity to participate in [address]. If you have any questions or concerns please feel free to contact me. I look forward to following  Ginette Dunham along with you. Electronically signed by: Lissett Crawford MD, MD, 12/14/2022 3:43 PM    Documentation was done using voice recognition dragon software. Every effort was made to ensure accuracy;however, inadvertent unintentional computerized transcription errors may be present.

## 2022-12-29 DIAGNOSIS — N17.9 AKI (ACUTE KIDNEY INJURY) (HCC): ICD-10-CM

## 2022-12-29 LAB
ANION GAP SERPL CALCULATED.3IONS-SCNC: 16 MMOL/L (ref 3–16)
BUN BLDV-MCNC: 20 MG/DL (ref 7–20)
CALCIUM SERPL-MCNC: 9.9 MG/DL (ref 8.3–10.6)
CHLORIDE BLD-SCNC: 103 MMOL/L (ref 99–110)
CO2: 21 MMOL/L (ref 21–32)
CREAT SERPL-MCNC: 0.9 MG/DL (ref 0.6–1.2)
GFR SERPL CREATININE-BSD FRML MDRD: >60 ML/MIN/{1.73_M2}
GLUCOSE BLD-MCNC: 93 MG/DL (ref 70–99)
POTASSIUM SERPL-SCNC: 4.4 MMOL/L (ref 3.5–5.1)
SODIUM BLD-SCNC: 140 MMOL/L (ref 136–145)

## 2023-01-11 DIAGNOSIS — M19.90 INFLAMMATORY ARTHRITIS: ICD-10-CM

## 2023-01-11 RX ORDER — LEFLUNOMIDE 20 MG/1
20 TABLET ORAL DAILY
Qty: 90 TABLET | OUTPATIENT
Start: 2023-01-11

## 2023-01-13 ENCOUNTER — TELEPHONE (OUTPATIENT)
Dept: RHEUMATOLOGY | Age: 68
End: 2023-01-13

## 2023-01-13 DIAGNOSIS — Z79.899 HIGH RISK MEDICATION USE: Primary | ICD-10-CM

## 2023-01-13 NOTE — TELEPHONE ENCOUNTER
Patient called with questions. She was placed on prednisone. She reports she will run out in a couple days and there are no refills on the bottle. She is on 5 mg daily currently because she though Dr. Adri Corbett advised her in the office to stay on 5mg until her appt. She went to get her labs drawn. She though  advised her to get labs every 4 weeks after starting [address] She went to the lab and there was no lab order. Is she supposed to get labs done every 4 weeks?

## 2023-01-13 NOTE — TELEPHONE ENCOUNTER
Please call the patient and let her know that she was not supposed to stay on prednisone 5 mg daily. She was given a tapering dose and advised to go off of it after she is done with a tapering dose. I placed order for standing labs.   She can go to any [address] [facility] facility for blood draw

## 2023-01-17 DIAGNOSIS — Z79.899 HIGH RISK MEDICATION USE: ICD-10-CM

## 2023-01-17 LAB
ALT SERPL-CCNC: 10 U/L (ref 10–40)
AST SERPL-CCNC: 11 U/L (ref 15–37)
BASOPHILS ABSOLUTE: 0.1 K/UL (ref 0–0.2)
BASOPHILS RELATIVE PERCENT: 0.7 %
CREAT SERPL-MCNC: 0.9 MG/DL (ref 0.6–1.2)
EOSINOPHILS ABSOLUTE: 0.1 K/UL (ref 0–0.6)
EOSINOPHILS RELATIVE PERCENT: 1.3 %
GFR SERPL CREATININE-BSD FRML MDRD: >60 ML/MIN/{1.73_M2}
HCT VFR BLD CALC: 45 % (ref 36–48)
HEMOGLOBIN: 14.4 G/DL (ref 12–16)
LYMPHOCYTES ABSOLUTE: 1.7 K/UL (ref 1–5.1)
LYMPHOCYTES RELATIVE PERCENT: 18.9 %
MCH RBC QN AUTO: 27.1 PG (ref 26–34)
MCHC RBC AUTO-ENTMCNC: 32.1 G/DL (ref 31–36)
MCV RBC AUTO: 84.4 FL (ref 80–100)
MONOCYTES ABSOLUTE: 0.4 K/UL (ref 0–1.3)
MONOCYTES RELATIVE PERCENT: 4.7 %
NEUTROPHILS ABSOLUTE: 6.9 K/UL (ref 1.7–7.7)
NEUTROPHILS RELATIVE PERCENT: 74.4 %
PDW BLD-RTO: 15 % (ref 12.4–15.4)
PLATELET # BLD: 262 K/UL (ref 135–450)
PMV BLD AUTO: 8.7 FL (ref 5–10.5)
RBC # BLD: 5.33 M/UL (ref 4–5.2)
WBC # BLD: 9.2 K/UL (ref 4–11)

## 2023-02-10 ENCOUNTER — OFFICE VISIT (OUTPATIENT)
Dept: INTERNAL MEDICINE CLINIC | Age: 68
End: 2023-02-10
Payer: MEDICARE

## 2023-02-10 VITALS
OXYGEN SATURATION: 99 % | SYSTOLIC BLOOD PRESSURE: 136 MMHG | DIASTOLIC BLOOD PRESSURE: 72 MMHG | WEIGHT: 230 LBS | HEIGHT: 63 IN | HEART RATE: 88 BPM | BODY MASS INDEX: 40.75 KG/M2

## 2023-02-10 DIAGNOSIS — F51.04 PSYCHOPHYSIOLOGICAL INSOMNIA: ICD-10-CM

## 2023-02-10 DIAGNOSIS — E66.01 OBESITY, CLASS III, BMI 40-49.9 (MORBID OBESITY) (HCC): ICD-10-CM

## 2023-02-10 DIAGNOSIS — I10 PRIMARY HYPERTENSION: Primary | ICD-10-CM

## 2023-02-10 DIAGNOSIS — M19.90 INFLAMMATORY ARTHRITIS: ICD-10-CM

## 2023-02-10 DIAGNOSIS — Z91.89 AT RISK FOR SLEEP APNEA: ICD-10-CM

## 2023-02-10 DIAGNOSIS — R11.10 INTRACTABLE VOMITING: ICD-10-CM

## 2023-02-10 DIAGNOSIS — R73.03 PREDIABETES: ICD-10-CM

## 2023-02-10 DIAGNOSIS — E78.2 MIXED HYPERLIPIDEMIA: ICD-10-CM

## 2023-02-10 PROBLEM — E66.813 OBESITY, CLASS III, BMI 40-49.9 (MORBID OBESITY): Status: ACTIVE | Noted: 2022-03-28

## 2023-02-10 PROBLEM — A02.9: Status: RESOLVED | Noted: 2022-04-15 | Resolved: 2023-02-10

## 2023-02-10 PROBLEM — M06.9 RHEUMATOID ARTHRITIS INVOLVING MULTIPLE SITES (HCC): Status: RESOLVED | Noted: 2022-03-28 | Resolved: 2023-02-10

## 2023-02-10 PROBLEM — E83.51 HYPOCALCEMIA: Status: RESOLVED | Noted: 2022-04-15 | Resolved: 2023-02-10

## 2023-02-10 PROBLEM — E83.42 HYPOMAGNESEMIA: Status: RESOLVED | Noted: 2022-09-06 | Resolved: 2023-02-10

## 2023-02-10 PROBLEM — N17.9 AKI (ACUTE KIDNEY INJURY) (HCC): Status: RESOLVED | Noted: 2022-04-28 | Resolved: 2023-02-10

## 2023-02-10 PROCEDURE — 3017F COLORECTAL CA SCREEN DOC REV: CPT | Performed by: INTERNAL MEDICINE

## 2023-02-10 PROCEDURE — G8417 CALC BMI ABV UP PARAM F/U: HCPCS | Performed by: INTERNAL MEDICINE

## 2023-02-10 PROCEDURE — 1090F PRES/ABSN URINE INCON ASSESS: CPT | Performed by: INTERNAL MEDICINE

## 2023-02-10 PROCEDURE — 3078F DIAST BP <80 MM HG: CPT | Performed by: INTERNAL MEDICINE

## 2023-02-10 PROCEDURE — 99214 OFFICE O/P EST MOD 30 MIN: CPT | Performed by: INTERNAL MEDICINE

## 2023-02-10 PROCEDURE — 1123F ACP DISCUSS/DSCN MKR DOCD: CPT | Performed by: INTERNAL MEDICINE

## 2023-02-10 PROCEDURE — 1036F TOBACCO NON-USER: CPT | Performed by: INTERNAL MEDICINE

## 2023-02-10 PROCEDURE — 3075F SYST BP GE 130 - 139MM HG: CPT | Performed by: INTERNAL MEDICINE

## 2023-02-10 PROCEDURE — G8484 FLU IMMUNIZE NO ADMIN: HCPCS | Performed by: INTERNAL MEDICINE

## 2023-02-10 PROCEDURE — G8427 DOCREV CUR MEDS BY ELIG CLIN: HCPCS | Performed by: INTERNAL MEDICINE

## 2023-02-10 PROCEDURE — G8399 PT W/DXA RESULTS DOCUMENT: HCPCS | Performed by: INTERNAL MEDICINE

## 2023-02-10 RX ORDER — AMLODIPINE BESYLATE 10 MG/1
10 TABLET ORAL DAILY
Qty: 30 TABLET | Refills: 3 | Status: SHIPPED | OUTPATIENT
Start: 2023-02-10

## 2023-02-10 RX ORDER — TRAZODONE HYDROCHLORIDE 50 MG/1
50 TABLET ORAL NIGHTLY
Qty: 60 TABLET | Refills: 1 | Status: SHIPPED | OUTPATIENT
Start: 2023-02-10

## 2023-02-10 RX ORDER — LOSARTAN POTASSIUM 50 MG/1
50 TABLET ORAL DAILY
Qty: 30 TABLET | Refills: 3 | Status: SHIPPED | OUTPATIENT
Start: 2023-02-10

## 2023-02-10 SDOH — ECONOMIC STABILITY: INCOME INSECURITY: HOW HARD IS IT FOR YOU TO PAY FOR THE VERY BASICS LIKE FOOD, HOUSING, MEDICAL CARE, AND HEATING?: NOT HARD AT ALL

## 2023-02-10 SDOH — ECONOMIC STABILITY: FOOD INSECURITY: WITHIN THE PAST 12 MONTHS, YOU WORRIED THAT YOUR FOOD WOULD RUN OUT BEFORE YOU GOT MONEY TO BUY MORE.: NEVER TRUE

## 2023-02-10 SDOH — ECONOMIC STABILITY: HOUSING INSECURITY
IN THE LAST 12 MONTHS, WAS THERE A TIME WHEN YOU DID NOT HAVE A STEADY PLACE TO SLEEP OR SLEPT IN A SHELTER (INCLUDING NOW)?: NO

## 2023-02-10 SDOH — ECONOMIC STABILITY: FOOD INSECURITY: WITHIN THE PAST 12 MONTHS, THE FOOD YOU BOUGHT JUST DIDN'T LAST AND YOU DIDN'T HAVE MONEY TO GET MORE.: NEVER TRUE

## 2023-02-10 ASSESSMENT — ENCOUNTER SYMPTOMS
ABDOMINAL PAIN: 0
VOMITING: 0
SHORTNESS OF BREATH: 0
CONSTIPATION: 0
BACK PAIN: 0
SORE THROAT: 0
COLOR CHANGE: 0
COUGH: 0
CHEST TIGHTNESS: 0
NAUSEA: 0
WHEEZING: 0

## 2023-02-10 ASSESSMENT — PATIENT HEALTH QUESTIONNAIRE - PHQ9
SUM OF ALL RESPONSES TO PHQ QUESTIONS 1-9: 0
2. FEELING DOWN, DEPRESSED OR HOPELESS: 0
SUM OF ALL RESPONSES TO PHQ9 QUESTIONS 1 & 2: 0
SUM OF ALL RESPONSES TO PHQ QUESTIONS 1-9: 0
1. LITTLE INTEREST OR PLEASURE IN DOING THINGS: 0

## 2023-02-10 NOTE — ASSESSMENT & PLAN NOTE
Diagnosed with gastroparesis by GI, symptoms improved with dietary modifications, no further intervention at this point

## 2023-02-10 NOTE — PROGRESS NOTES
ASSESSMENT/PLAN:  1. Primary hypertension  Assessment & Plan:   Blood pressure stable and well-controlled on current combination of amlodipine and losartan, will refill and continue same, advised patient can take over prescription from nephrology since kidney function is back to normal and she can follow-up with nephrology on as-needed basis. Reinforced recommendations to maintain healthy low-salt low-fat diet, continue attempts for weight loss as other means to help control blood pressure   Orders:  -     amLODIPine (NORVASC) 10 MG tablet; Take 1 tablet by mouth daily, Disp-30 tablet, R-3Normal  -     losartan (COZAAR) 50 MG tablet; Take 1 tablet by mouth daily, Disp-30 tablet, R-3Normal  2. Inflammatory arthritis  Assessment & Plan:   Has been on steroids and Arava by rheumatology, she has follow-up this week, will complete lab work today, will discuss with Dr. Yuan Falling future follow-up  3. Mixed hyperlipidemia  Assessment & Plan:   Diet and exercise reinforced, patient has been tried on statin in the past with myalgia and side effects, advised will update labs and make recommendations pending results  Orders:  -     Lipid Panel; Future  4. Obesity, Class III, BMI 40-49.9 (morbid obesity) ([facility] [address]  Assessment & Plan:   Patient trying to make changes to diet and level of activity, has been having hard time losing weight since she was placed on steroids for her arthritis but aware of diet and exercise recommendations  5. Prediabetes  Assessment & Plan:   Update labs, reinforced recommendations for low-carb diet and weight loss  Orders:  -     Hemoglobin A1C; Future  6. At risk for sleep apnea  -     Abril Tan MD, Sleep Medicine, [facility]  7. Psychophysiological insomnia  Assessment & Plan:   Sleep hygiene recommendations discussed with patient, avoid stimulants, willing to try trazodone again, will also refer to sleep medicine given symptoms suggestive of sleep apnea.   Orders:  -     traZODone (DESYREL) 50 MG tablet; Take 1 tablet by mouth nightly, Disp-60 tablet, R-1Normal  8. Intractable vomiting  Assessment & Plan:   Diagnosed with gastroparesis by GI, symptoms improved with dietary modifications, no further intervention at this point      Return in about 7 months (around 9/10/2023) for AWV. SUBJECTIVE  HPI:   Patient here for routine follow-up on chronic medical problems, wants to discuss insomnia and possible sleep apnea. States she snores when she sleeps, she recently gained weight with steroid therapy and  told her at times she does stop breathing. She has poor quality sleep, states she wakes up multiple times during the night, acknowledge pain has something to do with it but has been a poor sleeper for a long time      Review of Systems   Constitutional:  Negative for activity change, appetite change, fatigue and unexpected weight change. HENT:  Negative for congestion, hearing loss, mouth sores and sore throat. Eyes:  Negative for visual disturbance. Respiratory:  Negative for cough, chest tightness, shortness of breath and wheezing. Cardiovascular:  Negative for chest pain, palpitations and leg swelling. Gastrointestinal:  Negative for abdominal pain, constipation, nausea and vomiting. Genitourinary:  Negative for difficulty urinating, dysuria, frequency, hematuria and urgency. Musculoskeletal:  Positive for arthralgias and myalgias. Negative for back pain, gait problem and joint swelling. Skin:  Negative for color change. Allergic/Immunologic: Negative for environmental allergies and immunocompromised state. Neurological:  Negative for dizziness, light-headedness and headaches. Psychiatric/Behavioral:  Positive for sleep disturbance. Negative for behavioral problems and dysphoric mood. The patient is not nervous/anxious.       OBJECTIVE:    /72   Pulse 88   Ht 5' 3\" (1.6 m)   Wt 230 lb (104.3 kg)   SpO2 99%   BMI 40.74 kg/m²    Physical Exam  Constitutional: General: She is not in acute distress. Appearance: Normal appearance. She is obese. She is not toxic-appearing. HENT:      Head: Normocephalic. Eyes:      Conjunctiva/sclera: Conjunctivae normal.   Cardiovascular:      Rate and Rhythm: Normal rate and regular rhythm. Heart sounds: Normal heart sounds. No murmur heard. Pulmonary:      Effort: Pulmonary effort is normal. No respiratory distress. Abdominal:      Palpations: Abdomen is soft. Musculoskeletal:         General: Normal range of motion. Cervical back: Neck supple. Skin:     General: Skin is warm and dry. Neurological:      General: No focal deficit present. Mental Status: She is alert. Cranial Nerves: No cranial nerve deficit. Psychiatric:         Mood and Affect: Mood normal.         Electronically signed by Nancy Green MD on 2/10/2023 at 10:03 AM.    This dictation was generated by voice recognition computer software. Although all attempts are made to edit the dictation for accuracy, there may be errors in the transcription that are not intended.

## 2023-02-10 NOTE — ASSESSMENT & PLAN NOTE
Blood pressure stable and well-controlled on current combination of amlodipine and losartan, will refill and continue same, advised patient can take over prescription from nephrology since kidney function is back to normal and she can follow-up with nephrology on as-needed basis.   Reinforced recommendations to maintain healthy low-salt low-fat diet, continue attempts for weight loss as other means to help control blood pressure

## 2023-02-10 NOTE — ASSESSMENT & PLAN NOTE
Diet and exercise reinforced, patient has been tried on statin in the past with myalgia and side effects, advised will update labs and make recommendations pending results

## 2023-02-10 NOTE — ASSESSMENT & PLAN NOTE
Has been on steroids and Arava by rheumatology, she has follow-up this week, will complete lab work today, will discuss with Dr. Jaylen Goldman future follow-up

## 2023-02-10 NOTE — ASSESSMENT & PLAN NOTE
Sleep hygiene recommendations discussed with patient, avoid stimulants, willing to try trazodone again, will also refer to sleep medicine given symptoms suggestive of sleep apnea.

## 2023-02-10 NOTE — ASSESSMENT & PLAN NOTE
Patient trying to make changes to diet and level of activity, has been having hard time losing weight since she was placed on steroids for her arthritis but aware of diet and exercise recommendations

## 2023-03-08 ENCOUNTER — TELEPHONE (OUTPATIENT)
Dept: INTERNAL MEDICINE CLINIC | Age: 68
End: 2023-03-08

## 2023-03-08 NOTE — TELEPHONE ENCOUNTER
Called patient to discuss letter received from [facility] regarding need for updated mammogram.     Patient states that she did receive the same letter, but ignored it because she was told to follow-up in 3 years since they put the clip in. Patient advised to call and follow-up with mammography dept on their recommendations.

## 2023-05-13 DIAGNOSIS — M19.90 INFLAMMATORY ARTHRITIS: ICD-10-CM

## 2023-05-16 RX ORDER — LEFLUNOMIDE 20 MG/1
20 TABLET ORAL DAILY
Qty: 30 TABLET | Refills: 2 | OUTPATIENT
Start: 2023-05-16

## 2023-06-02 DIAGNOSIS — F51.04 PSYCHOPHYSIOLOGICAL INSOMNIA: ICD-10-CM

## 2023-06-02 RX ORDER — TRAZODONE HYDROCHLORIDE 50 MG/1
TABLET ORAL
Qty: 60 TABLET | Refills: 0 | Status: SHIPPED | OUTPATIENT
Start: 2023-06-02

## 2023-07-12 DIAGNOSIS — I10 PRIMARY HYPERTENSION: ICD-10-CM

## 2023-07-12 RX ORDER — AMLODIPINE BESYLATE 10 MG/1
10 TABLET ORAL DAILY
Qty: 30 TABLET | Refills: 3 | Status: SHIPPED | OUTPATIENT
Start: 2023-07-12

## 2023-07-12 RX ORDER — AMLODIPINE BESYLATE 10 MG/1
10 TABLET ORAL DAILY
Qty: 30 TABLET | Refills: 3 | OUTPATIENT
Start: 2023-07-12

## 2023-07-12 RX ORDER — LOSARTAN POTASSIUM 50 MG/1
50 TABLET ORAL DAILY
Qty: 30 TABLET | Refills: 3 | OUTPATIENT
Start: 2023-07-12

## 2023-07-12 RX ORDER — LOSARTAN POTASSIUM 50 MG/1
50 TABLET ORAL DAILY
Qty: 30 TABLET | Refills: 3 | Status: SHIPPED | OUTPATIENT
Start: 2023-07-12

## 2023-07-27 DIAGNOSIS — F51.04 PSYCHOPHYSIOLOGICAL INSOMNIA: ICD-10-CM

## 2023-07-31 ENCOUNTER — OFFICE VISIT (OUTPATIENT)
Dept: PULMONOLOGY | Age: 68
End: 2023-07-31
Payer: MEDICARE

## 2023-07-31 VITALS
HEIGHT: 63 IN | OXYGEN SATURATION: 99 % | SYSTOLIC BLOOD PRESSURE: 138 MMHG | WEIGHT: 231 LBS | HEART RATE: 96 BPM | DIASTOLIC BLOOD PRESSURE: 91 MMHG | BODY MASS INDEX: 40.93 KG/M2

## 2023-07-31 DIAGNOSIS — K21.00 GASTROESOPHAGEAL REFLUX DISEASE WITH ESOPHAGITIS WITHOUT HEMORRHAGE: Chronic | ICD-10-CM

## 2023-07-31 DIAGNOSIS — G47.10 HYPERSOMNIA: Primary | ICD-10-CM

## 2023-07-31 DIAGNOSIS — R06.83 SNORING: ICD-10-CM

## 2023-07-31 DIAGNOSIS — J30.2 SEASONAL ALLERGIC RHINITIS, UNSPECIFIED TRIGGER: Chronic | ICD-10-CM

## 2023-07-31 DIAGNOSIS — E66.01 OBESITY, CLASS III, BMI 40-49.9 (MORBID OBESITY) (HCC): Chronic | ICD-10-CM

## 2023-07-31 DIAGNOSIS — I10 PRIMARY HYPERTENSION: Chronic | ICD-10-CM

## 2023-07-31 PROBLEM — E66.813 OBESITY, CLASS III, BMI 40-49.9 (MORBID OBESITY): Chronic | Status: ACTIVE | Noted: 2022-03-28

## 2023-07-31 PROBLEM — F51.04 PSYCHOPHYSIOLOGICAL INSOMNIA: Chronic | Status: ACTIVE | Noted: 2023-02-10

## 2023-07-31 PROBLEM — E78.2 MIXED HYPERLIPIDEMIA: Chronic | Status: ACTIVE | Noted: 2022-03-28

## 2023-07-31 PROBLEM — R73.03 PREDIABETES: Chronic | Status: ACTIVE | Noted: 2022-04-15

## 2023-07-31 PROCEDURE — 3017F COLORECTAL CA SCREEN DOC REV: CPT | Performed by: INTERNAL MEDICINE

## 2023-07-31 PROCEDURE — 99204 OFFICE O/P NEW MOD 45 MIN: CPT | Performed by: INTERNAL MEDICINE

## 2023-07-31 PROCEDURE — 3075F SYST BP GE 130 - 139MM HG: CPT | Performed by: INTERNAL MEDICINE

## 2023-07-31 PROCEDURE — 1123F ACP DISCUSS/DSCN MKR DOCD: CPT | Performed by: INTERNAL MEDICINE

## 2023-07-31 PROCEDURE — 3080F DIAST BP >= 90 MM HG: CPT | Performed by: INTERNAL MEDICINE

## 2023-07-31 PROCEDURE — G8427 DOCREV CUR MEDS BY ELIG CLIN: HCPCS | Performed by: INTERNAL MEDICINE

## 2023-07-31 PROCEDURE — G8417 CALC BMI ABV UP PARAM F/U: HCPCS | Performed by: INTERNAL MEDICINE

## 2023-07-31 PROCEDURE — G8399 PT W/DXA RESULTS DOCUMENT: HCPCS | Performed by: INTERNAL MEDICINE

## 2023-07-31 PROCEDURE — 1090F PRES/ABSN URINE INCON ASSESS: CPT | Performed by: INTERNAL MEDICINE

## 2023-07-31 PROCEDURE — 1036F TOBACCO NON-USER: CPT | Performed by: INTERNAL MEDICINE

## 2023-07-31 RX ORDER — TRAZODONE HYDROCHLORIDE 50 MG/1
TABLET ORAL
Qty: 60 TABLET | Refills: 0 | Status: SHIPPED | OUTPATIENT
Start: 2023-07-31

## 2023-07-31 ASSESSMENT — SLEEP AND FATIGUE QUESTIONNAIRES
HOW LIKELY ARE YOU TO NOD OFF OR FALL ASLEEP IN A CAR, WHILE STOPPED FOR A FEW MINUTES IN TRAFFIC: 0
HOW LIKELY ARE YOU TO NOD OFF OR FALL ASLEEP WHILE SITTING AND TALKING TO SOMEONE: 1
ESS TOTAL SCORE: 16
HOW LIKELY ARE YOU TO NOD OFF OR FALL ASLEEP WHILE WATCHING TV: 2
HOW LIKELY ARE YOU TO NOD OFF OR FALL ASLEEP WHILE SITTING QUIETLY AFTER LUNCH WITHOUT ALCOHOL: 3
HOW LIKELY ARE YOU TO NOD OFF OR FALL ASLEEP WHILE LYING DOWN TO REST IN THE AFTERNOON WHEN CIRCUMSTANCES PERMIT: 3
HOW LIKELY ARE YOU TO NOD OFF OR FALL ASLEEP WHEN YOU ARE A PASSENGER IN A CAR FOR AN HOUR WITHOUT A BREAK: 3
HOW LIKELY ARE YOU TO NOD OFF OR FALL ASLEEP WHILE SITTING AND READING: 2
HOW LIKELY ARE YOU TO NOD OFF OR FALL ASLEEP WHILE SITTING INACTIVE IN A PUBLIC PLACE: 2

## 2023-07-31 NOTE — PROGRESS NOTES
Anne Crowley CNP  Magdalenejuanito Loco CNP [facility] [address] [facility] 28, 323 [facility] [phone]   [facility]  [address], [address] [place] [address] [phone]     [address]  9[address] [facility],Third Floor SLEEP MEDICINE  [address] [facility]  1 [facility] Kye Wilson [address] [address]  Dept: [phone]  Dept Fax: [phone]  Loc: [phone]    Assessment:      Visit Diagnoses and Associated Orders       Hypersomnia   (New Problem)  -  Primary    needs work-up    POLYSOMNOGRAPHY (PSG) - Diagnostic Testing [52456 Custom]   - Future Order         Snoring   (New Problem)      needs work-up    POLYSOMNOGRAPHY (PSG) - Diagnostic Testing [80588 Custom]   - Future Order                  Plan:      One or more undiagnosed new problem with uncertain prognosis till final diagnosis is made. Differential diagnosis includes but not limited to: JHON, PLMD's, narcolepsy, parasomnias. Reviewed JHON (which is the highest likelihood diagnosis): pathophysiology, diagnosis, complications and treatment. Instructed her not to drive if drowsy. Continue medications per her PCP and other physicians. Limit caffeine use after 3pm. Will do PSG to rule-out JHON and other sleep disorders. 1 wk follow up after study to review her results. The chronic medical conditions listed are directly related to the primary diagnosis listed above. The management of the primary diagnosis affects the secondary diagnosis and vice versa. Reviewed request for consultation from the patient's primary care doctor dated 2/10/2023 showing the patient with possible risk factors for sleep disordered breathing. Reviewed CBC and hemoglobin A1c from 2/10/2023: Essentially normal except for mildly elevated hemoglobin A1c. This information was analyzed to assess complexity and medical decision making in regards to further testing and management.     Continue meds for: hypertension, allergic

## 2023-08-15 ENCOUNTER — HOSPITAL ENCOUNTER (OUTPATIENT)
Dept: SLEEP CENTER | Age: 68
Discharge: HOME | End: 2023-08-15
Payer: MEDICARE

## 2023-08-15 DIAGNOSIS — R06.83 SNORING: ICD-10-CM

## 2023-08-15 DIAGNOSIS — G47.10 HYPERSOMNIA: ICD-10-CM

## 2023-08-15 PROCEDURE — 95810 POLYSOM 6/> YRS 4/> PARAM: CPT

## 2023-08-17 PROCEDURE — 95810 POLYSOM 6/> YRS 4/> PARAM: CPT | Performed by: INTERNAL MEDICINE

## 2023-08-21 ENCOUNTER — TELEPHONE (OUTPATIENT)
Dept: PULMONOLOGY | Age: 68
End: 2023-08-21

## 2023-08-21 DIAGNOSIS — G47.33 OBSTRUCTIVE SLEEP APNEA (ADULT) (PEDIATRIC): Primary | ICD-10-CM

## 2023-08-21 NOTE — TELEPHONE ENCOUNTER
Spoke with pt to review PSG results. Pt states she is familiar with CPAP through her spouse using it. Pt to schedule a titration study. Walk in [employer]

## 2023-09-11 ENCOUNTER — OFFICE VISIT (OUTPATIENT)
Dept: INTERNAL MEDICINE CLINIC | Age: 68
End: 2023-09-11

## 2023-09-11 VITALS
HEART RATE: 74 BPM | OXYGEN SATURATION: 97 % | DIASTOLIC BLOOD PRESSURE: 78 MMHG | WEIGHT: 230.2 LBS | HEIGHT: 63 IN | SYSTOLIC BLOOD PRESSURE: 134 MMHG | BODY MASS INDEX: 40.79 KG/M2

## 2023-09-11 DIAGNOSIS — R73.03 PREDIABETES: Chronic | ICD-10-CM

## 2023-09-11 DIAGNOSIS — M19.90 INFLAMMATORY ARTHRITIS: ICD-10-CM

## 2023-09-11 DIAGNOSIS — I10 PRIMARY HYPERTENSION: Chronic | ICD-10-CM

## 2023-09-11 DIAGNOSIS — E66.01 OBESITY, CLASS III, BMI 40-49.9 (MORBID OBESITY) (HCC): Chronic | ICD-10-CM

## 2023-09-11 DIAGNOSIS — E78.2 MIXED HYPERLIPIDEMIA: Chronic | ICD-10-CM

## 2023-09-11 DIAGNOSIS — L30.9 DERMATITIS: ICD-10-CM

## 2023-09-11 DIAGNOSIS — Z00.00 MEDICARE ANNUAL WELLNESS VISIT, SUBSEQUENT: Primary | ICD-10-CM

## 2023-09-11 DIAGNOSIS — G47.33 OSA (OBSTRUCTIVE SLEEP APNEA): ICD-10-CM

## 2023-09-11 DIAGNOSIS — F12.90 USES MARIJUANA: ICD-10-CM

## 2023-09-11 DIAGNOSIS — Z12.31 BREAST CANCER SCREENING BY MAMMOGRAM: ICD-10-CM

## 2023-09-11 PROBLEM — Z91.89 AT RISK FOR SLEEP APNEA: Status: RESOLVED | Noted: 2023-02-10 | Resolved: 2023-09-11

## 2023-09-11 RX ORDER — AMLODIPINE BESYLATE 10 MG/1
10 TABLET ORAL DAILY
Qty: 90 TABLET | Refills: 1 | Status: SHIPPED | OUTPATIENT
Start: 2023-09-11

## 2023-09-11 RX ORDER — LOSARTAN POTASSIUM 50 MG/1
50 TABLET ORAL DAILY
Qty: 90 TABLET | Refills: 1 | Status: SHIPPED | OUTPATIENT
Start: 2023-09-11

## 2023-09-11 RX ORDER — CLOTRIMAZOLE AND BETAMETHASONE DIPROPIONATE 10; .64 MG/G; MG/G
CREAM TOPICAL
Qty: 90 G | Refills: 1 | Status: SHIPPED | OUTPATIENT
Start: 2023-09-11

## 2023-09-11 ASSESSMENT — ENCOUNTER SYMPTOMS
SHORTNESS OF BREATH: 0
WHEEZING: 0
BACK PAIN: 1
COLOR CHANGE: 0
CHEST TIGHTNESS: 0
VOMITING: 0
ABDOMINAL PAIN: 0
CONSTIPATION: 0
COUGH: 0
SORE THROAT: 0
NAUSEA: 1

## 2023-09-11 ASSESSMENT — PATIENT HEALTH QUESTIONNAIRE - PHQ9
2. FEELING DOWN, DEPRESSED OR HOPELESS: 0
SUM OF ALL RESPONSES TO PHQ QUESTIONS 1-9: 1
SUM OF ALL RESPONSES TO PHQ9 QUESTIONS 1 & 2: 1
SUM OF ALL RESPONSES TO PHQ QUESTIONS 1-9: 1
1. LITTLE INTEREST OR PLEASURE IN DOING THINGS: 1

## 2023-09-11 NOTE — ASSESSMENT & PLAN NOTE
skin lesions on upper extremity may represent fungal dermatitis, patient also has a skin lesion corner of right eye, with past history of basal cell carcinoma, not able to get into see her dermatologist in 6 months, advised will do a trial of Lotrisone cream for the scaly lesions on her arm however recommended follow-up with dermatology for the high lesion

## 2023-09-11 NOTE — ASSESSMENT & PLAN NOTE
update labs, reinforced recommendations to maintain low-carb diet and continue attempts for weight loss

## 2023-09-11 NOTE — PROGRESS NOTES
Medicare Annual Wellness Visit  Name: Prince Rahman Date: 2023   MRN: [id number] Sex: Female   Age: 77 y.o. Ethnicity: Non- / Non    : 1955 Race: White (non-)      Patricia Grayson is here for Medicare AWV and Rash (R arm where she had cellulitis last year. Scaly place upper R eyelid. )     Screenings for behavioral, psychosocial and functional/safety risks, and cognitive dysfunction are all negative except as indicated below. These results, as well as other patient data from the [address] [facility], are documented in Flowsheets linked to this Encounter. Allergies   Allergen Reactions    Penicillins Hives and Swelling    Rofecoxib Hives and Swelling    Sulfa Antibiotics Hives and Swelling    Tegaderm Ag Mesh [Silver]      Had blisters under tegaderm       Prior to Visit Medications    Medication Sig Taking? Authorizing Provider   clotrimazole-betamethasone (LOTRISONE) 1-0.05 % cream Apply topically 2 times daily.  Yes Isabell Eisenmenger, MD   amLODIPine (NORVASC) 10 MG tablet Take 1 tablet by mouth daily Yes Isabell Eisenmenger, MD   losartan (COZAAR) 50 MG tablet Take 1 tablet by mouth daily Yes Isabell Eisenmenger, MD   Handicap Placard MISC by Does not apply route Valid 2022 till 2027 Yes Isabell Eisenmenger, MD   loratadine (CLARITIN) 10 MG capsule Take 1 capsule by mouth as needed Yes Historical Provider, MD   Ascorbic Acid (VITAMIN C PO) Take by mouth at bedtime Yes Historical Provider, MD   vitamin D 25 MCG (1000 UT) CAPS Take by mouth at bedtime Yes Historical Provider, MD   omeprazole 20 MG EC tablet Take 1 tablet by mouth at bedtime Yes Historical Provider, MD       Past Medical History:   Diagnosis Date    Food poisoning 2022    resulting in dehydration with SHEELA    Hypertension     PTSD (post-traumatic stress disorder)     Rheumatoid arthritis ([address])     Salmonella 2022    Skin cancer      Past Surgical History:   Procedure Laterality Date    ANTERIOR

## 2023-09-11 NOTE — ASSESSMENT & PLAN NOTE
patient reports she started using marijuana Gummies which she first got from her grandson and now using them regularly because she found them very helpful with sleep and joint pain.   Counseled patient at length about and supervised use of marijuana, explained it is still considered a drug with potential for dependency and abuse, I again offered referral to pain management so that the use is supervised however patient declined

## 2023-09-11 NOTE — ASSESSMENT & PLAN NOTE
patient has been seeing Dr. Donya Frankel who tried her on both steroids and oral however since she moved she became too far for her and wants to have new referral to rheumatology.   She was never officially diagnosed with rheumatoid arthritis, new referral placed

## 2023-09-11 NOTE — ASSESSMENT & PLAN NOTE
diet and exercise recommendations reinforced, patient is currently limited with exercise ability due to arthritis of both knees and other joints however she has been trying to adjust her diet

## 2023-09-11 NOTE — ASSESSMENT & PLAN NOTE
sleep studies results consistent with sleep apnea, currently in the process of completing CPAP titration to initiate therapy compliance reinforced and encouraged to continue attempts for weight loss and follow-up with sleep medicine

## 2023-09-11 NOTE — ASSESSMENT & PLAN NOTE
blood pressure stable and well-controlled on current medication regimen, will refill and continue same, update labs, reinforced recommendations to maintain healthy low-carb low-salt low-fat diet and continue attempts for weight loss

## 2023-09-15 DIAGNOSIS — I10 PRIMARY HYPERTENSION: Chronic | ICD-10-CM

## 2023-09-15 DIAGNOSIS — E78.2 MIXED HYPERLIPIDEMIA: Chronic | ICD-10-CM

## 2023-09-15 DIAGNOSIS — R73.03 PREDIABETES: Chronic | ICD-10-CM

## 2023-09-15 LAB
ALBUMIN SERPL-MCNC: 4.3 G/DL (ref 3.4–5)
ALBUMIN/GLOB SERPL: 1.8 {RATIO} (ref 1.1–2.2)
ALP SERPL-CCNC: 87 U/L (ref 40–129)
ALT SERPL-CCNC: 12 U/L (ref 10–40)
ANION GAP SERPL CALCULATED.3IONS-SCNC: 11 MMOL/L (ref 3–16)
AST SERPL-CCNC: 12 U/L (ref 15–37)
BILIRUB SERPL-MCNC: 0.6 MG/DL (ref 0–1)
BUN SERPL-MCNC: 17 MG/DL (ref 7–20)
CALCIUM SERPL-MCNC: 9.7 MG/DL (ref 8.3–10.6)
CHLORIDE SERPL-SCNC: 104 MMOL/L (ref 99–110)
CHOLEST SERPL-MCNC: 235 MG/DL (ref 0–199)
CO2 SERPL-SCNC: 28 MMOL/L (ref 21–32)
CREAT SERPL-MCNC: 1.1 MG/DL (ref 0.6–1.2)
DEPRECATED RDW RBC AUTO: 15.3 % (ref 12.4–15.4)
GFR SERPLBLD CREATININE-BSD FMLA CKD-EPI: 54 ML/MIN/{1.73_M2}
GLUCOSE SERPL-MCNC: 104 MG/DL (ref 70–99)
HCT VFR BLD AUTO: 41.2 % (ref 36–48)
HDLC SERPL-MCNC: 43 MG/DL (ref 40–60)
HGB BLD-MCNC: 13.3 G/DL (ref 12–16)
LDLC SERPL CALC-MCNC: 158 MG/DL
MCH RBC QN AUTO: 26.9 PG (ref 26–34)
MCHC RBC AUTO-ENTMCNC: 32.3 G/DL (ref 31–36)
MCV RBC AUTO: 83.5 FL (ref 80–100)
PLATELET # BLD AUTO: 282 K/UL (ref 135–450)
PMV BLD AUTO: 8.7 FL (ref 5–10.5)
POTASSIUM SERPL-SCNC: 4.3 MMOL/L (ref 3.5–5.1)
PROT SERPL-MCNC: 6.7 G/DL (ref 6.4–8.2)
RBC # BLD AUTO: 4.93 M/UL (ref 4–5.2)
SODIUM SERPL-SCNC: 143 MMOL/L (ref 136–145)
TRIGL SERPL-MCNC: 171 MG/DL (ref 0–150)
VLDLC SERPL CALC-MCNC: 34 MG/DL
WBC # BLD AUTO: 10.5 K/UL (ref 4–11)

## 2023-09-16 LAB
EST. AVERAGE GLUCOSE BLD GHB EST-MCNC: 119.8 MG/DL
HBA1C MFR BLD: 5.8 %

## 2023-09-19 ENCOUNTER — HOSPITAL ENCOUNTER (OUTPATIENT)
Dept: SLEEP CENTER | Age: 68
Discharge: HOME | End: 2023-09-19
Payer: MEDICARE

## 2023-09-19 ENCOUNTER — HOSPITAL ENCOUNTER (OUTPATIENT)
Dept: WOMENS IMAGING | Age: 68
Discharge: HOME | End: 2023-09-19
Payer: MEDICARE

## 2023-09-19 DIAGNOSIS — Z12.31 BREAST CANCER SCREENING BY MAMMOGRAM: ICD-10-CM

## 2023-09-19 DIAGNOSIS — G47.33 OBSTRUCTIVE SLEEP APNEA (ADULT) (PEDIATRIC): ICD-10-CM

## 2023-09-19 DIAGNOSIS — Z12.31 VISIT FOR SCREENING MAMMOGRAM: ICD-10-CM

## 2023-09-19 PROCEDURE — 77063 BREAST TOMOSYNTHESIS BI: CPT

## 2023-09-19 PROCEDURE — 95811 POLYSOM 6/>YRS CPAP 4/> PARM: CPT

## 2023-09-26 ENCOUNTER — TELEPHONE (OUTPATIENT)
Dept: PULMONOLOGY | Age: 68
End: 2023-09-26

## 2023-09-26 NOTE — TELEPHONE ENCOUNTER
Spoke with pt to review titration study results. Order to be sent to [facility].  Pt to schedule f/u

## 2023-10-11 PROBLEM — Z00.00 MEDICARE ANNUAL WELLNESS VISIT, SUBSEQUENT: Status: RESOLVED | Noted: 2023-09-11 | Resolved: 2023-10-11

## 2023-11-27 ENCOUNTER — OFFICE VISIT (OUTPATIENT)
Dept: PULMONOLOGY | Age: 68
End: 2023-11-27
Payer: MEDICARE

## 2023-11-27 VITALS
BODY MASS INDEX: 40.74 KG/M2 | WEIGHT: 230 LBS | DIASTOLIC BLOOD PRESSURE: 84 MMHG | SYSTOLIC BLOOD PRESSURE: 153 MMHG | HEART RATE: 81 BPM

## 2023-11-27 DIAGNOSIS — I10 PRIMARY HYPERTENSION: Chronic | ICD-10-CM

## 2023-11-27 DIAGNOSIS — G47.33 OSA (OBSTRUCTIVE SLEEP APNEA): Primary | ICD-10-CM

## 2023-11-27 PROCEDURE — G8399 PT W/DXA RESULTS DOCUMENT: HCPCS | Performed by: NURSE PRACTITIONER

## 2023-11-27 PROCEDURE — G8427 DOCREV CUR MEDS BY ELIG CLIN: HCPCS | Performed by: NURSE PRACTITIONER

## 2023-11-27 PROCEDURE — G8417 CALC BMI ABV UP PARAM F/U: HCPCS | Performed by: NURSE PRACTITIONER

## 2023-11-27 PROCEDURE — 99214 OFFICE O/P EST MOD 30 MIN: CPT | Performed by: NURSE PRACTITIONER

## 2023-11-27 PROCEDURE — 1036F TOBACCO NON-USER: CPT | Performed by: NURSE PRACTITIONER

## 2023-11-27 PROCEDURE — 3017F COLORECTAL CA SCREEN DOC REV: CPT | Performed by: NURSE PRACTITIONER

## 2023-11-27 PROCEDURE — 1090F PRES/ABSN URINE INCON ASSESS: CPT | Performed by: NURSE PRACTITIONER

## 2023-11-27 PROCEDURE — G8484 FLU IMMUNIZE NO ADMIN: HCPCS | Performed by: NURSE PRACTITIONER

## 2023-11-27 PROCEDURE — 3077F SYST BP >= 140 MM HG: CPT | Performed by: NURSE PRACTITIONER

## 2023-11-27 PROCEDURE — 1123F ACP DISCUSS/DSCN MKR DOCD: CPT | Performed by: NURSE PRACTITIONER

## 2023-11-27 PROCEDURE — 3079F DIAST BP 80-89 MM HG: CPT | Performed by: NURSE PRACTITIONER

## 2023-11-27 ASSESSMENT — SLEEP AND FATIGUE QUESTIONNAIRES
HOW LIKELY ARE YOU TO NOD OFF OR FALL ASLEEP WHEN YOU ARE A PASSENGER IN A CAR FOR AN HOUR WITHOUT A BREAK: 1
HOW LIKELY ARE YOU TO NOD OFF OR FALL ASLEEP WHILE SITTING INACTIVE IN A PUBLIC PLACE: 0
HOW LIKELY ARE YOU TO NOD OFF OR FALL ASLEEP WHILE SITTING AND TALKING TO SOMEONE: 0
HOW LIKELY ARE YOU TO NOD OFF OR FALL ASLEEP WHILE SITTING AND READING: 1
HOW LIKELY ARE YOU TO NOD OFF OR FALL ASLEEP WHILE SITTING QUIETLY AFTER LUNCH WITHOUT ALCOHOL: 0
HOW LIKELY ARE YOU TO NOD OFF OR FALL ASLEEP IN A CAR, WHILE STOPPED FOR A FEW MINUTES IN TRAFFIC: 0
HOW LIKELY ARE YOU TO NOD OFF OR FALL ASLEEP WHILE WATCHING TV: 2
ESS TOTAL SCORE: 6
HOW LIKELY ARE YOU TO NOD OFF OR FALL ASLEEP WHILE LYING DOWN TO REST IN THE AFTERNOON WHEN CIRCUMSTANCES PERMIT: 2

## 2023-11-27 NOTE — PROGRESS NOTES
Davon Rangel CNP  Magdalenejuanito Loco CNP [address] [address] Counts include 234 beds at the [facility] 28, 713 [facility]  P- [phone] 313 [facility]  [address], [address] [place] [address] [phone] [address] [address]  Dept: [phone]  Dept Fax: [phone]  Loc: [phone]      Assessment/Plan:      1. JHON (obstructive sleep apnea)  Assessment & Plan:  New problem - On treatment: Reviewed results of PSG and in lab titration study results with the patient. Reviewed and analyzed results of physiologic download from patient's machine and reviewed with patients. Supplies and parts as needed for the machine. These are medically necessary. Limit caffeine use after 3 PM. Based on the analyzed data, we will make the following changes: Ramp pressure increased to 6, ramp time decreased to 10 min for comfort. Discussed trial of a mask liner to see if it helps with reducing mask leak. Also suggested to switch to a smaller size/size S to see if it fits better with less mask leak. She will return in 3 mo for follow up. She was instructed to contact the office if experiences new or worsening of symptoms. Encouraged her to continue to use the machine each night. Encouraged the patient to contact the office with any questions or concerns. 2. Primary hypertension  Assessment & Plan:   Chronic- Stable. Discussed the importance of treating sleep apnea as part of the management of this disorder. Cont any meds per PCP and other physicians. Reviewed, analyzed, and documented physiologic data from patient's PAP machine. This information was analyzed to assess complexity and medical decision making in regards to further testing and management. The primary encounter diagnosis was JHON (obstructive sleep apnea). A diagnosis of Primary hypertension was also pertinent to this visit.   The

## 2023-11-27 NOTE — ASSESSMENT & PLAN NOTE
New problem - On treatment: Reviewed results of PSG and in lab titration study results with the patient. Reviewed and analyzed results of physiologic download from patient's machine and reviewed with patients. Supplies and parts as needed for the machine. These are medically necessary. Limit caffeine use after 3 PM. Based on the analyzed data, we will make the following changes: Ramp pressure increased to 6, ramp time decreased to 10 min for comfort. Discussed trial of a mask liner to see if it helps with reducing mask leak. Also suggested to switch to a smaller size/size S to see if it fits better with less mask leak. She will return in 3 mo for follow up. She was instructed to contact the office if experiences new or worsening of symptoms. Encouraged her to continue to use the machine each night. Encouraged the patient to contact the office with any questions or concerns.

## 2024-02-27 ENCOUNTER — OFFICE VISIT (OUTPATIENT)
Dept: PULMONOLOGY | Age: 69
End: 2024-02-27
Payer: MEDICARE

## 2024-02-27 VITALS
BODY MASS INDEX: 35.68 KG/M2 | OXYGEN SATURATION: 96 % | SYSTOLIC BLOOD PRESSURE: 142 MMHG | DIASTOLIC BLOOD PRESSURE: 81 MMHG | WEIGHT: 201.4 LBS | HEART RATE: 104 BPM

## 2024-02-27 DIAGNOSIS — I10 PRIMARY HYPERTENSION: ICD-10-CM

## 2024-02-27 DIAGNOSIS — E66.09 CLASS 2 OBESITY DUE TO EXCESS CALORIES WITH BODY MASS INDEX (BMI) OF 35.0 TO 35.9 IN ADULT, UNSPECIFIED WHETHER SERIOUS COMORBIDITY PRESENT: ICD-10-CM

## 2024-02-27 DIAGNOSIS — G47.33 OSA (OBSTRUCTIVE SLEEP APNEA): Primary | ICD-10-CM

## 2024-02-27 PROCEDURE — 1036F TOBACCO NON-USER: CPT | Performed by: NURSE PRACTITIONER

## 2024-02-27 PROCEDURE — G8484 FLU IMMUNIZE NO ADMIN: HCPCS | Performed by: NURSE PRACTITIONER

## 2024-02-27 PROCEDURE — 3077F SYST BP >= 140 MM HG: CPT | Performed by: NURSE PRACTITIONER

## 2024-02-27 PROCEDURE — G8417 CALC BMI ABV UP PARAM F/U: HCPCS | Performed by: NURSE PRACTITIONER

## 2024-02-27 PROCEDURE — 99214 OFFICE O/P EST MOD 30 MIN: CPT | Performed by: NURSE PRACTITIONER

## 2024-02-27 PROCEDURE — G8399 PT W/DXA RESULTS DOCUMENT: HCPCS | Performed by: NURSE PRACTITIONER

## 2024-02-27 PROCEDURE — 1123F ACP DISCUSS/DSCN MKR DOCD: CPT | Performed by: NURSE PRACTITIONER

## 2024-02-27 PROCEDURE — 3079F DIAST BP 80-89 MM HG: CPT | Performed by: NURSE PRACTITIONER

## 2024-02-27 PROCEDURE — 3017F COLORECTAL CA SCREEN DOC REV: CPT | Performed by: NURSE PRACTITIONER

## 2024-02-27 PROCEDURE — 1090F PRES/ABSN URINE INCON ASSESS: CPT | Performed by: NURSE PRACTITIONER

## 2024-02-27 PROCEDURE — G8427 DOCREV CUR MEDS BY ELIG CLIN: HCPCS | Performed by: NURSE PRACTITIONER

## 2024-02-27 RX ORDER — M-VIT,TX,IRON,MINS/CALC/FOLIC 27MG-0.4MG
1 TABLET ORAL DAILY
COMMUNITY

## 2024-02-27 ASSESSMENT — SLEEP AND FATIGUE QUESTIONNAIRES
ESS TOTAL SCORE: 5
HOW LIKELY ARE YOU TO NOD OFF OR FALL ASLEEP IN A CAR, WHILE STOPPED FOR A FEW MINUTES IN TRAFFIC: 0
HOW LIKELY ARE YOU TO NOD OFF OR FALL ASLEEP WHEN YOU ARE A PASSENGER IN A CAR FOR AN HOUR WITHOUT A BREAK: 1
HOW LIKELY ARE YOU TO NOD OFF OR FALL ASLEEP WHILE SITTING AND READING: 1
HOW LIKELY ARE YOU TO NOD OFF OR FALL ASLEEP WHILE SITTING INACTIVE IN A PUBLIC PLACE: 0
HOW LIKELY ARE YOU TO NOD OFF OR FALL ASLEEP WHILE SITTING AND TALKING TO SOMEONE: 0
HOW LIKELY ARE YOU TO NOD OFF OR FALL ASLEEP WHILE SITTING QUIETLY AFTER LUNCH WITHOUT ALCOHOL: 0
HOW LIKELY ARE YOU TO NOD OFF OR FALL ASLEEP WHILE LYING DOWN TO REST IN THE AFTERNOON WHEN CIRCUMSTANCES PERMIT: 1
HOW LIKELY ARE YOU TO NOD OFF OR FALL ASLEEP WHILE WATCHING TV: 2

## 2024-02-27 NOTE — ASSESSMENT & PLAN NOTE
Chronic- Stable.  Discussed the importance of treating sleep apnea as part of the management of this disorder.  Cont any meds per PCP and other physicians.     None

## 2024-02-27 NOTE — ASSESSMENT & PLAN NOTE
Chronic - Stable: Reviewed and analyzed results of physiologic download from patient's machine and reviewed with patients. Supplies and parts as needed for the machine. These are medically necessary. Limit caffeine use after 3 PM. Based on the analyzed data, we will continue with current settings. Discussed using a nasal steroid spray at bed time consistently for nasal congestion. If she continues to breath through her mouth despite resolution of nasal congestion, she can consider trial of a chin strap with the nasal mask. If this does not work, she may need to re-consider trial of a full face mask. Discussed trial of different styles of masks/headgear for comfort. Reviewed a few options and resources were provided. Recommended in office mask fitting to ensure proper fit of the mask. She will return in 3 mo for follow up. Instructed her to return sooner or contact the office if experiences new or worsening of symptoms. Encouraged her to continue to use her machine each night. Encouraged the patient to contact the office with any questions or concerns.

## 2024-02-27 NOTE — PROGRESS NOTES
Chronic-not stable:  Discussed importance of treating obstructive sleep apnea and getting sufficient sleep to assist with weight control.  Encouraged her to work on weight loss through diet and exercise. Recommended DASH or Mediterranean diets.      Reviewed, analyzed, and documented physiologic data from patient's PAP machine.    This information was analyzed to assess complexity and medical decision making in regards to further testing and management.    The primary encounter diagnosis was JHON (obstructive sleep apnea). Diagnoses of Primary hypertension and Class 2 obesity due to excess calories with body mass index (BMI) of 35.0 to 35.9 in adult, unspecified whether serious comorbidity present were also pertinent to this visit.  The chronic medical conditions listed are directly related to the primary diagnosis listed above.  The management of the primary diagnosis affects the secondary diagnosis and vice versa.     Subjective:   Subjective   Patient ID: Paty Win is a 68 y.o. female.    Chief Complaint   Patient presents with    cfu     HPI:  Machine Modem/Download Info:  Compliance (hours/night): 5.6 hrs/night  % of nights >= 4 hrs: 100 %  Download AHI (/hour): 2.3 /HR     Average IPAP Pressure: 17.1 cmH2O  Average EPAP Pressure: 12.1 cmH2O  AUTO BILEVEL - Settings (ResMed)  IPAP Max: 25 cmH2O  EPAP Min: 10 cmH2O  Pressure Support: 5       Comfort Settings  Flex/EPR (0-3): 3  Mask: Nasal     Paty Win presents today for follow up for sleep apnea.   States she has been using her machine consistently even though sometimes it is hard. Switched to a smaller nasal mask but still feel like it is too big, often it comes down into her mouth. Also having issues with dry mouth. Feels like she is opening her mouth when breathing. Tried a full face mask in the past but did not work for her/did not like it as she felt claustrophobic. Has nasal congestion but does not use a nasal spray. Pressure feels

## 2024-02-27 NOTE — PROGRESS NOTES
Diagnosis: [x] JHON (G47.33) [] CSA (G47.31) [] Apnea (G47.30)   Length of Need: [x] 18 Months [] 99 Months [] Other:   Machine (HANNAH!): [] Respironics Dream Station   2   Auto [] ResMed AirSense     Auto 11 [] Other:     []  CPAP () [] Bilevel ()   Mode: [] Auto [] Spontaneous    Mode: [] Auto [] Spontaneous             Comfort Settings:      Humidifier: [] Heated ()        [x] Water chamber replacement ()/ 1 per 6 months        Mask:   [x] Nasal () /1 per 3 months [] Full Face () /1 per 3 months   [x] Patient choice -Size and fit mask [] Patient Choice - Size and fit mask   [] Dispense: [] Dispense:   [x] Headgear () / 1 per 3 months [] Headgear () / 1 per 3 months   [x] Replacement Nasal Cushion ()/2 per month [] Interface Replacement ()/1 per month   [] Replacement Nasal Pillows ()/2 per month         Tubing: [x] Heated ()/1 per 3 months    [] Standard ()/1 per 3 months [] Other:           Filters: [x] Non-disposable ()/1 per 6 months     [x] Ultra-Fine, Disposable ()/2 per month        Miscellaneous: [] Chin Strap ()/ 1 per 6 months [] O2 bleed-in:        LPM   [] Oxymetry on CPAP/Bilevel [x]  Other: Modem: ()         Start Order Date: 24    MEDICAL JUSTIFICATION:  I, the undersigned, certify that the above prescribed supplies are medically necessary for this patient’s wellbeing.  In my opinion, the supplies are both reasonable and necessary in reference to accepted standards of medicalpractice in treatment of this patient’s condition.    Magdalene Loco CNP    NPI: [id number]     Order Signed Date: 24    Paty Win  1955  [address] [facility] [id number] [phone] (home)   [phone] (mobile)      Insurance Info (confirm with patient if correct):  Payer/Plan Subscr  Sex Relation Sub. Ins. ID Effective Group Num

## 2024-02-27 NOTE — PROGRESS NOTES
Diagnosis: [x] JHON (G47.33) [] CSA (G47.31) [] Apnea (G47.30)   Length of Need: [x] 18 Months [] 99 Months [] Other:   Machine (HANNAH!): [] Respironics Dream Station   2   Auto [] ResMed AirSense     Auto 11 [] Other:     []  CPAP () [] Bilevel ()   Mode: [] Auto [] Spontaneous    Mode: [] Auto [] Spontaneous             Comfort Settings:      Humidifier: [] Heated ()        [x] Water chamber replacement ()/ 1 per 6 months        Mask:   [x] Nasal () /1 per 3 months [] Full Face () /1 per 3 months   [x] Patient choice -Size and fit mask [] Patient Choice - Size and fit mask   [] Dispense: [] Dispense:   [x] Headgear () / 1 per 3 months [] Headgear () / 1 per 3 months   [] Replacement Nasal Cushion ()/2 per month [] Interface Replacement ()/1 per month   [x] Replacement Nasal Pillows ()/2 per month         Tubing: [x] Heated ()/1 per 3 months    [] Standard ()/1 per 3 months [] Other:           Filters: [x] Non-disposable ()/1 per 6 months     [x] Ultra-Fine, Disposable ()/2 per month        Miscellaneous: [] Chin Strap ()/ 1 per 6 months [] O2 bleed-in:        LPM   [] Oxymetry on CPAP/Bilevel [x]  Other: Modem: ()         Start Order Date: 24    MEDICAL JUSTIFICATION:  I, the undersigned, certify that the above prescribed supplies are medically necessary for this patient’s wellbeing.  In my opinion, the supplies are both reasonable and necessary in reference to accepted standards of medicalpractice in treatment of this patient’s condition.    Magdalene Loco CNP    NPI: [id number]     Order Signed Date: 24    Paty Win  1955  [address] [facility] [id number] [phone] (home)   [phone] (mobile)      Insurance Info (confirm with patient if correct):  Payer/Plan Subscr  Sex Relation Sub. Ins. ID Effective Group Num

## 2024-03-11 ENCOUNTER — OFFICE VISIT (OUTPATIENT)
Dept: INTERNAL MEDICINE CLINIC | Age: 69
End: 2024-03-11
Payer: MEDICARE

## 2024-03-11 VITALS
WEIGHT: 201 LBS | DIASTOLIC BLOOD PRESSURE: 80 MMHG | SYSTOLIC BLOOD PRESSURE: 140 MMHG | HEART RATE: 94 BPM | OXYGEN SATURATION: 99 % | BODY MASS INDEX: 35.61 KG/M2

## 2024-03-11 DIAGNOSIS — R73.03 PREDIABETES: Chronic | ICD-10-CM

## 2024-03-11 DIAGNOSIS — K52.9 CHRONIC DIARRHEA: ICD-10-CM

## 2024-03-11 DIAGNOSIS — E55.9 VITAMIN D DEFICIENCY: ICD-10-CM

## 2024-03-11 DIAGNOSIS — K21.00 GASTROESOPHAGEAL REFLUX DISEASE WITH ESOPHAGITIS WITHOUT HEMORRHAGE: Chronic | ICD-10-CM

## 2024-03-11 DIAGNOSIS — E78.2 MIXED HYPERLIPIDEMIA: Chronic | ICD-10-CM

## 2024-03-11 DIAGNOSIS — I10 PRIMARY HYPERTENSION: Chronic | ICD-10-CM

## 2024-03-11 DIAGNOSIS — R11.10 INTRACTABLE VOMITING: Primary | ICD-10-CM

## 2024-03-11 DIAGNOSIS — R11.10 INTRACTABLE VOMITING: ICD-10-CM

## 2024-03-11 DIAGNOSIS — K31.84 GASTROPARESIS: ICD-10-CM

## 2024-03-11 DIAGNOSIS — F51.04 PSYCHOPHYSIOLOGICAL INSOMNIA: Chronic | ICD-10-CM

## 2024-03-11 PROBLEM — E66.01 OBESITY, CLASS III, BMI 40-49.9 (MORBID OBESITY) (HCC): Chronic | Status: RESOLVED | Noted: 2022-03-28 | Resolved: 2024-03-11

## 2024-03-11 PROBLEM — E66.813 OBESITY, CLASS III, BMI 40-49.9 (MORBID OBESITY): Chronic | Status: RESOLVED | Noted: 2022-03-28 | Resolved: 2024-03-11

## 2024-03-11 LAB
DEPRECATED RDW RBC AUTO: 16.3 % (ref 12.4–15.4)
FOLATE SERPL-MCNC: 10.89 NG/ML (ref 4.78–24.2)
HCT VFR BLD AUTO: 43.8 % (ref 36–48)
HGB BLD-MCNC: 14.6 G/DL (ref 12–16)
MCH RBC QN AUTO: 27.8 PG (ref 26–34)
MCHC RBC AUTO-ENTMCNC: 33.2 G/DL (ref 31–36)
MCV RBC AUTO: 83.6 FL (ref 80–100)
PLATELET # BLD AUTO: 283 K/UL (ref 135–450)
PMV BLD AUTO: 8.4 FL (ref 5–10.5)
PREALB SERPL-MCNC: 19.4 MG/DL (ref 20–40)
RBC # BLD AUTO: 5.24 M/UL (ref 4–5.2)
VIT B12 SERPL-MCNC: 648 PG/ML (ref 211–911)
WBC # BLD AUTO: 12 K/UL (ref 4–11)

## 2024-03-11 PROCEDURE — G8417 CALC BMI ABV UP PARAM F/U: HCPCS | Performed by: INTERNAL MEDICINE

## 2024-03-11 PROCEDURE — G8399 PT W/DXA RESULTS DOCUMENT: HCPCS | Performed by: INTERNAL MEDICINE

## 2024-03-11 PROCEDURE — 3079F DIAST BP 80-89 MM HG: CPT | Performed by: INTERNAL MEDICINE

## 2024-03-11 PROCEDURE — G8427 DOCREV CUR MEDS BY ELIG CLIN: HCPCS | Performed by: INTERNAL MEDICINE

## 2024-03-11 PROCEDURE — 99214 OFFICE O/P EST MOD 30 MIN: CPT | Performed by: INTERNAL MEDICINE

## 2024-03-11 PROCEDURE — 3017F COLORECTAL CA SCREEN DOC REV: CPT | Performed by: INTERNAL MEDICINE

## 2024-03-11 PROCEDURE — 1123F ACP DISCUSS/DSCN MKR DOCD: CPT | Performed by: INTERNAL MEDICINE

## 2024-03-11 PROCEDURE — 3077F SYST BP >= 140 MM HG: CPT | Performed by: INTERNAL MEDICINE

## 2024-03-11 PROCEDURE — 1036F TOBACCO NON-USER: CPT | Performed by: INTERNAL MEDICINE

## 2024-03-11 PROCEDURE — 1090F PRES/ABSN URINE INCON ASSESS: CPT | Performed by: INTERNAL MEDICINE

## 2024-03-11 PROCEDURE — G8484 FLU IMMUNIZE NO ADMIN: HCPCS | Performed by: INTERNAL MEDICINE

## 2024-03-11 SDOH — ECONOMIC STABILITY: INCOME INSECURITY: HOW HARD IS IT FOR YOU TO PAY FOR THE VERY BASICS LIKE FOOD, HOUSING, MEDICAL CARE, AND HEATING?: NOT HARD AT ALL

## 2024-03-11 SDOH — ECONOMIC STABILITY: FOOD INSECURITY: WITHIN THE PAST 12 MONTHS, YOU WORRIED THAT YOUR FOOD WOULD RUN OUT BEFORE YOU GOT MONEY TO BUY MORE.: NEVER TRUE

## 2024-03-11 SDOH — ECONOMIC STABILITY: FOOD INSECURITY: WITHIN THE PAST 12 MONTHS, THE FOOD YOU BOUGHT JUST DIDN'T LAST AND YOU DIDN'T HAVE MONEY TO GET MORE.: NEVER TRUE

## 2024-03-11 ASSESSMENT — ENCOUNTER SYMPTOMS
CHEST TIGHTNESS: 0
COUGH: 0
ABDOMINAL PAIN: 0
DIARRHEA: 1
SORE THROAT: 0
VOMITING: 1
WHEEZING: 0
SHORTNESS OF BREATH: 0
NAUSEA: 1
BACK PAIN: 0
CONSTIPATION: 0
COLOR CHANGE: 0

## 2024-03-11 ASSESSMENT — PATIENT HEALTH QUESTIONNAIRE - PHQ9
1. LITTLE INTEREST OR PLEASURE IN DOING THINGS: 0
SUM OF ALL RESPONSES TO PHQ QUESTIONS 1-9: 0
SUM OF ALL RESPONSES TO PHQ9 QUESTIONS 1 & 2: 0
2. FEELING DOWN, DEPRESSED OR HOPELESS: 0
SUM OF ALL RESPONSES TO PHQ QUESTIONS 1-9: 0

## 2024-03-11 NOTE — PROGRESS NOTES
ASSESSMENT/PLAN:  1. Intractable vomiting  Assessment & Plan:    secondary to gastroparesis, she unfortunately remains asymptomatic despite her attempting to do 6 small meals per day but finding that very inconvenient, she has been using Ensure supplements due to continuing to lose weight.  Will update labs today including albumin and prealbumin although explained to patient unlikely malnutrition despite weight loss over the past few months.  She does not want to be on any medications, recommend she follows up with GI as needed  Orders:  -     CBC; Future  -     Comprehensive Metabolic Panel; Future  -     Hemoglobin A1C; Future  -     TSH with Reflex to FT4; Future  -     Vitamin B12 & Folate; Future  -     Vitamin D 25 Hydroxy; Future  -     Prealbumin; Future  2. Primary hypertension  Assessment & Plan:    blood pressure checked twice and remained borderline elevated, advised patient to keep ambulatory blood pressure log for over the next week and if still elevated will need medication adjustment, will update labs, reinforced recommendation to maintain healthy low-salt diet and increase level of physical activity  Orders:  -     CBC; Future  -     Comprehensive Metabolic Panel; Future  3. Mixed hyperlipidemia  -     Comprehensive Metabolic Panel; Future  -     Lipid Panel; Future  -     TSH with Reflex to FT4; Future  -     Vitamin B12 & Folate; Future  4. Prediabetes  Assessment & Plan:    diet and exercise recommendations reinforced, there has been recent weight loss, will update labs this visit  Orders:  -     Comprehensive Metabolic Panel; Future  -     Hemoglobin A1C; Future  5. Vitamin D deficiency  -     Vitamin D 25 Hydroxy; Future  6. Uses marijuana  7. Chronic diarrhea  Assessment & Plan:    had complete GI workup, recommend continue attempts with diet changes until able to find a diet that works for her.  Update labs, can try cholestyramine if symptoms worsen  8. Gastroesophageal reflux disease with

## 2024-03-11 NOTE — ASSESSMENT & PLAN NOTE
reports doing well ever since started taking marijuana Gummies.  Again recommended against use of marijuana and explained long-term side effects and potential for dependency however patient reports only uses at bedtime and found it helpful with her sleep and arthritis pain and reporting she will probably continue to use it.  Reinforced recommendations to avoid stimulants and maintain sleep hygiene recommendation

## 2024-03-11 NOTE — ASSESSMENT & PLAN NOTE
diet and exercise recommendations reinforced, there has been recent weight loss, will update labs this visit

## 2024-03-11 NOTE — ASSESSMENT & PLAN NOTE
remains on PPI therapy, no clinical symptoms other than intractable vomiting which is unrelated to her acid reflux, continue follow-up with GI as needed

## 2024-03-11 NOTE — ASSESSMENT & PLAN NOTE
blood pressure checked twice and remained borderline elevated, advised patient to keep ambulatory blood pressure log for over the next week and if still elevated will need medication adjustment, will update labs, reinforced recommendation to maintain healthy low-salt diet and increase level of physical activity

## 2024-03-11 NOTE — ASSESSMENT & PLAN NOTE
had complete GI workup, recommend continue attempts with diet changes until able to find a diet that works for her.  Update labs, can try cholestyramine if symptoms worsen

## 2024-03-11 NOTE — ASSESSMENT & PLAN NOTE
secondary to gastroparesis, she unfortunately remains asymptomatic despite her attempting to do 6 small meals per day but finding that very inconvenient, she has been using Ensure supplements due to continuing to lose weight.  Will update labs today including albumin and prealbumin although explained to patient unlikely malnutrition despite weight loss over the past few months.  She does not want to be on any medications, recommend she follows up with GI as needed

## 2024-03-12 DIAGNOSIS — E87.6 HYPOKALEMIA: Primary | ICD-10-CM

## 2024-03-12 DIAGNOSIS — E87.6 HYPOKALEMIA: ICD-10-CM

## 2024-03-12 DIAGNOSIS — R74.8 ELEVATED ALKALINE PHOSPHATASE LEVEL: ICD-10-CM

## 2024-03-12 DIAGNOSIS — D72.829 LEUKOCYTOSIS, UNSPECIFIED TYPE: ICD-10-CM

## 2024-03-12 LAB
25(OH)D3 SERPL-MCNC: 34.7 NG/ML
ALBUMIN SERPL-MCNC: 4.5 G/DL (ref 3.4–5)
ALBUMIN/GLOB SERPL: 1.5 {RATIO} (ref 1.1–2.2)
ALP SERPL-CCNC: 252 U/L (ref 40–129)
ALT SERPL-CCNC: 41 U/L (ref 10–40)
ANION GAP SERPL CALCULATED.3IONS-SCNC: 15 MMOL/L (ref 3–16)
AST SERPL-CCNC: 36 U/L (ref 15–37)
BILIRUB SERPL-MCNC: 0.5 MG/DL (ref 0–1)
BUN SERPL-MCNC: 14 MG/DL (ref 7–20)
CALCIUM SERPL-MCNC: 9.9 MG/DL (ref 8.3–10.6)
CHLORIDE SERPL-SCNC: 104 MMOL/L (ref 99–110)
CHOLEST SERPL-MCNC: 280 MG/DL (ref 0–199)
CO2 SERPL-SCNC: 25 MMOL/L (ref 21–32)
CREAT SERPL-MCNC: 1 MG/DL (ref 0.6–1.2)
EST. AVERAGE GLUCOSE BLD GHB EST-MCNC: 102.5 MG/DL
GFR SERPLBLD CREATININE-BSD FMLA CKD-EPI: >60 ML/MIN/{1.73_M2}
GLUCOSE SERPL-MCNC: 109 MG/DL (ref 70–99)
HBA1C MFR BLD: 5.2 %
HDLC SERPL-MCNC: 50 MG/DL (ref 40–60)
LDLC SERPL CALC-MCNC: 204 MG/DL
POTASSIUM SERPL-SCNC: 3.3 MMOL/L (ref 3.5–5.1)
PROT SERPL-MCNC: 7.6 G/DL (ref 6.4–8.2)
SODIUM SERPL-SCNC: 144 MMOL/L (ref 136–145)
TRIGL SERPL-MCNC: 132 MG/DL (ref 0–150)
TSH SERPL DL<=0.005 MIU/L-ACNC: 1.29 UIU/ML (ref 0.27–4.2)
VLDLC SERPL CALC-MCNC: 26 MG/DL

## 2024-03-12 RX ORDER — POTASSIUM CHLORIDE 20 MEQ/1
20 TABLET, EXTENDED RELEASE ORAL DAILY
Qty: 90 TABLET | OUTPATIENT
Start: 2024-03-12

## 2024-03-12 RX ORDER — POTASSIUM CHLORIDE 20 MEQ/1
20 TABLET, EXTENDED RELEASE ORAL DAILY
Qty: 60 TABLET | Refills: 0 | Status: SHIPPED | OUTPATIENT
Start: 2024-03-12

## 2024-03-13 DIAGNOSIS — D72.829 LEUKOCYTOSIS, UNSPECIFIED TYPE: ICD-10-CM

## 2024-03-13 DIAGNOSIS — R74.8 ELEVATED ALKALINE PHOSPHATASE LEVEL: ICD-10-CM

## 2024-03-13 LAB
ALBUMIN SERPL-MCNC: 4.4 G/DL (ref 3.4–5)
ALP SERPL-CCNC: 241 U/L (ref 40–129)
ALT SERPL-CCNC: 42 U/L (ref 10–40)
AST SERPL-CCNC: 43 U/L (ref 15–37)
BASOPHILS # BLD: 0.1 K/UL (ref 0–0.2)
BASOPHILS NFR BLD: 0.5 %
BILIRUB DIRECT SERPL-MCNC: <0.2 MG/DL (ref 0–0.3)
BILIRUB INDIRECT SERPL-MCNC: ABNORMAL MG/DL (ref 0–1)
BILIRUB SERPL-MCNC: 0.5 MG/DL (ref 0–1)
DEPRECATED RDW RBC AUTO: 16.1 % (ref 12.4–15.4)
EOSINOPHIL # BLD: 0.1 K/UL (ref 0–0.6)
EOSINOPHIL NFR BLD: 0.6 %
HCT VFR BLD AUTO: 41.5 % (ref 36–48)
HGB BLD-MCNC: 13.9 G/DL (ref 12–16)
LYMPHOCYTES # BLD: 2.1 K/UL (ref 1–5.1)
LYMPHOCYTES NFR BLD: 15.7 %
MCH RBC QN AUTO: 27.8 PG (ref 26–34)
MCHC RBC AUTO-ENTMCNC: 33.5 G/DL (ref 31–36)
MCV RBC AUTO: 83 FL (ref 80–100)
MONOCYTES # BLD: 0.6 K/UL (ref 0–1.3)
MONOCYTES NFR BLD: 4.7 %
NEUTROPHILS # BLD: 10.5 K/UL (ref 1.7–7.7)
NEUTROPHILS NFR BLD: 78.5 %
PLATELET # BLD AUTO: 282 K/UL (ref 135–450)
PMV BLD AUTO: 8.4 FL (ref 5–10.5)
PROT SERPL-MCNC: 6.9 G/DL (ref 6.4–8.2)
RBC # BLD AUTO: 5.01 M/UL (ref 4–5.2)
WBC # BLD AUTO: 13.4 K/UL (ref 4–11)

## 2024-03-16 LAB
ALP BONE SERPL-CCNC: 59 U/L (ref 0–55)
ALP ISOS SERPL HS-CCNC: 0 U/L
ALP LIVER SERPL-CCNC: 188 U/L (ref 0–94)
ALP SERPL-CCNC: 247 U/L (ref 40–120)

## 2024-04-01 ENCOUNTER — OFFICE VISIT (OUTPATIENT)
Dept: INTERNAL MEDICINE CLINIC | Age: 69
End: 2024-04-01
Payer: MEDICARE

## 2024-04-01 VITALS
OXYGEN SATURATION: 98 % | BODY MASS INDEX: 35.43 KG/M2 | WEIGHT: 200 LBS | SYSTOLIC BLOOD PRESSURE: 136 MMHG | DIASTOLIC BLOOD PRESSURE: 78 MMHG | HEART RATE: 66 BPM

## 2024-04-01 DIAGNOSIS — I10 PRIMARY HYPERTENSION: Chronic | ICD-10-CM

## 2024-04-01 DIAGNOSIS — E78.2 MIXED HYPERLIPIDEMIA: Chronic | ICD-10-CM

## 2024-04-01 DIAGNOSIS — K76.0 HEPATIC STEATOSIS: ICD-10-CM

## 2024-04-01 DIAGNOSIS — Z01.818 PREOP EXAM FOR INTERNAL MEDICINE: Primary | ICD-10-CM

## 2024-04-01 PROBLEM — T50.905A URTICARIA DUE TO DRUG ALLERGY: Status: RESOLVED | Noted: 2022-04-15 | Resolved: 2024-04-01

## 2024-04-01 PROBLEM — R93.89 ENDOMETRIAL THICKENING ON ULTRASOUND: Status: RESOLVED | Noted: 2022-04-15 | Resolved: 2024-04-01

## 2024-04-01 PROBLEM — L50.0 URTICARIA DUE TO DRUG ALLERGY: Status: RESOLVED | Noted: 2022-04-15 | Resolved: 2024-04-01

## 2024-04-01 PROCEDURE — G8417 CALC BMI ABV UP PARAM F/U: HCPCS | Performed by: INTERNAL MEDICINE

## 2024-04-01 PROCEDURE — 3078F DIAST BP <80 MM HG: CPT | Performed by: INTERNAL MEDICINE

## 2024-04-01 PROCEDURE — 1090F PRES/ABSN URINE INCON ASSESS: CPT | Performed by: INTERNAL MEDICINE

## 2024-04-01 PROCEDURE — 1123F ACP DISCUSS/DSCN MKR DOCD: CPT | Performed by: INTERNAL MEDICINE

## 2024-04-01 PROCEDURE — 3075F SYST BP GE 130 - 139MM HG: CPT | Performed by: INTERNAL MEDICINE

## 2024-04-01 PROCEDURE — 99214 OFFICE O/P EST MOD 30 MIN: CPT | Performed by: INTERNAL MEDICINE

## 2024-04-01 PROCEDURE — 1036F TOBACCO NON-USER: CPT | Performed by: INTERNAL MEDICINE

## 2024-04-01 PROCEDURE — G8399 PT W/DXA RESULTS DOCUMENT: HCPCS | Performed by: INTERNAL MEDICINE

## 2024-04-01 PROCEDURE — 3017F COLORECTAL CA SCREEN DOC REV: CPT | Performed by: INTERNAL MEDICINE

## 2024-04-01 PROCEDURE — G8427 DOCREV CUR MEDS BY ELIG CLIN: HCPCS | Performed by: INTERNAL MEDICINE

## 2024-04-01 RX ORDER — PRAVASTATIN SODIUM 20 MG
20 TABLET ORAL EVERY EVENING
Qty: 90 TABLET | Refills: 1 | Status: SHIPPED | OUTPATIENT
Start: 2024-04-01

## 2024-04-01 ASSESSMENT — ENCOUNTER SYMPTOMS
CHEST TIGHTNESS: 0
CONSTIPATION: 0
COUGH: 0
VOMITING: 0
ABDOMINAL PAIN: 0
WHEEZING: 0
DIARRHEA: 1
NAUSEA: 1
SHORTNESS OF BREATH: 0
BACK PAIN: 0
SORE THROAT: 0
COLOR CHANGE: 0

## 2024-04-01 NOTE — ASSESSMENT & PLAN NOTE
results of cholesterol panel explained to patient, she was previously tried on statin therapy and had myalgia so she discontinued, does not recall which agent she was put on she is willing to try it again advised will start with low potency statin as Pravachol 20 mg and titrate dose gradually up.  If remains unable to tolerate statins then will consider other options.  Diet and exercise recommendations reinforced

## 2024-04-01 NOTE — ASSESSMENT & PLAN NOTE
H&P form filled out and faxed to [facility].  Labs checked recently, patient can stay on current medications, she is medically clear to proceed with scheduled cataract surgery.

## 2024-04-01 NOTE — ASSESSMENT & PLAN NOTE
blood pressure stable and well-controlled on current combination of amlodipine and losartan, will continue same, reinforced recommendations for salt restriction, weight reduction, healthy diet and active lifestyle as other means to help control blood pressure

## 2024-04-01 NOTE — PROGRESS NOTES
ASSESSMENT/PLAN:  1. Preop exam for internal medicine  Assessment & Plan:    H&P form filled out and faxed to [facility].  Labs checked recently, patient can stay on current medications, she is medically clear to proceed with scheduled cataract surgery.  2. Primary hypertension  Assessment & Plan:    blood pressure stable and well-controlled on current combination of amlodipine and losartan, will continue same, reinforced recommendations for salt restriction, weight reduction, healthy diet and active lifestyle as other means to help control blood pressure  3. Mixed hyperlipidemia  Assessment & Plan:    results of cholesterol panel explained to patient, she was previously tried on statin therapy and had myalgia so she discontinued, does not recall which agent she was put on she is willing to try it again advised will start with low potency statin as Pravachol 20 mg and titrate dose gradually up.  If remains unable to tolerate statins then will consider other options.  Diet and exercise recommendations reinforced  Orders:  -     pravastatin (PRAVACHOL) 20 MG tablet; Take 1 tablet by mouth every evening, Disp-90 tablet, R-1Normal  4. Hepatic steatosis  Assessment & Plan:    patient is scheduled to follow-up with GI in the near future, encouraged to maintain healthy low-fat diet.  Lab results reviewed and discussed with patient      Return for as scheduled.     SUBJECTIVE  HPI:   Patient here for preop exam, scheduled to have cataract surgery both eyes 2 weeks apart by [facility]  And has been doing much better since her recent office visit, and weight has been stable        Review of Systems   Constitutional:  Negative for activity change, appetite change and fatigue.   HENT:  Negative for congestion, hearing loss, mouth sores and sore throat.    Respiratory:  Negative for cough, chest tightness, shortness of breath and wheezing.    Cardiovascular:  Negative for chest pain, palpitations and

## 2024-04-01 NOTE — ASSESSMENT & PLAN NOTE
patient is scheduled to follow-up with GI in the near future, encouraged to maintain healthy low-fat diet.  Lab results reviewed and discussed with patient

## 2024-04-07 DIAGNOSIS — I10 PRIMARY HYPERTENSION: Chronic | ICD-10-CM

## 2024-04-08 RX ORDER — LOSARTAN POTASSIUM 50 MG/1
50 TABLET ORAL DAILY
Qty: 90 TABLET | Refills: 1 | Status: SHIPPED | OUTPATIENT
Start: 2024-04-08

## 2024-04-08 RX ORDER — AMLODIPINE BESYLATE 10 MG/1
10 TABLET ORAL DAILY
Qty: 90 TABLET | Refills: 1 | Status: SHIPPED | OUTPATIENT
Start: 2024-04-08

## 2024-04-09 NOTE — PROGRESS NOTES
please do not wear any jewelry or body piercing's on the day of surgery.   All jewelry must be removed.      If you have dentures, they will be removed before going to operating room.    For your convenience, we will provide you with a container.    If you wear contact lenses or glasses, they will be removed, please bring a case for them.     If you have a living will and a durable power of  for healthcare, please bring in a copy.     As part of our patient safety program to minimize surgical site infections, we ask you to do the following:    Please notify your surgeon if you develop any illness between         now and the  day of your surgery.    This includes a cough, cold, fever, sore throat, nausea,         or vomiting, and diarrhea, etc.   Please notify your surgeon if you experience dizziness, shortness         of breath or blurred vision between now and the time of your surgery.      Do not shave your operative site 96 hours prior to surgery.   For face and neck surgery, men may use an electric razor 48 hours   prior to surgery.    You may shower the night before surgery or the morning of   your surgery with an antibacterial soap.    You will need to bring a photo ID and insurance card    [facility] has an onsite pharmacy, would you like to utilize our pharmacy     If you will be staying overnight and use a C-pap machine, please bring   your C-pap to hospital     Our goal is to provide you with excellent care, therefore, visitors will be limited to two(2) in the room at a time so that we may focus on providing this care for you.          Please contact pre-admission testing if you have any further questions.                 Jeny Aguilera phone number:  [phone]     [facility] [place] number:  [phone]  Please note these are generalized instructions for all surgical cases, you may be provided with more specific instructions according to your surgery.    C-Difficile admission screening and protocol:

## 2024-04-12 ENCOUNTER — ANESTHESIA EVENT (OUTPATIENT)
Dept: SURGERY | Age: 69
End: 2024-04-12
Payer: MEDICARE

## 2024-04-12 NOTE — PRE-PROCEDURE INSTRUCTIONS
[address] [facility] [address]  [phone]        Pre-Op Phone Call:     Patient Name: Paty Win     Telephone Information:   Mobile [phone]     Home phone:  [phone]    Surgery Time:   11:45 AM     Arrival Time:  1015     Left extended Message:  No     Message left with:     Recent change in health status:  No     Advised of transportation/ policy:  Yes     NPO policy reviewed:  Yes     Advised to take morning heart/blood pressure medications with sips of water morning of surgery?  Yes     Instructed to bring eye drops, photo identification, and insurance card day of surgery?  Yes     Advised to wear short sleeved button down shirt (no T-shirt underneath):  Yes     Advised not to wear jewelry, hairpins, or pantyhose day of surgery?  Yes     Advised not to wear make-up and to wash face day of surgery?  Yes    Remarks:        Electronically signed by:  Deborah Melendrez RN at 4/12/2024 10:32 AM

## 2024-04-14 ENCOUNTER — PREP FOR PROCEDURE (OUTPATIENT)
Dept: OPHTHALMOLOGY | Age: 69
End: 2024-04-14

## 2024-04-14 RX ORDER — KETOROLAC TROMETHAMINE 5 MG/ML
1 SOLUTION OPHTHALMIC SEE ADMIN INSTRUCTIONS
Status: CANCELLED | OUTPATIENT
Start: 2024-04-14

## 2024-04-14 RX ORDER — SODIUM CHLORIDE 0.9 % (FLUSH) 0.9 %
5-40 SYRINGE (ML) INJECTION EVERY 12 HOURS SCHEDULED
Status: CANCELLED | OUTPATIENT
Start: 2024-04-14

## 2024-04-14 RX ORDER — CIPROFLOXACIN HYDROCHLORIDE 3.5 MG/ML
1 SOLUTION/ DROPS TOPICAL SEE ADMIN INSTRUCTIONS
Status: CANCELLED | OUTPATIENT
Start: 2024-04-14

## 2024-04-14 RX ORDER — SODIUM CHLORIDE 0.9 % (FLUSH) 0.9 %
5-40 SYRINGE (ML) INJECTION PRN
Status: CANCELLED | OUTPATIENT
Start: 2024-04-14

## 2024-04-14 RX ORDER — TROPICAMIDE 10 MG/ML
1 SOLUTION/ DROPS OPHTHALMIC SEE ADMIN INSTRUCTIONS
Status: CANCELLED | OUTPATIENT
Start: 2024-04-14

## 2024-04-14 RX ORDER — PHENYLEPHRINE HYDROCHLORIDE 25 MG/ML
1 SOLUTION/ DROPS OPHTHALMIC SEE ADMIN INSTRUCTIONS
Status: CANCELLED | OUTPATIENT
Start: 2024-04-14

## 2024-04-14 RX ORDER — CYCLOPENTOLATE HYDROCHLORIDE 10 MG/ML
1 SOLUTION/ DROPS OPHTHALMIC PRN
Status: CANCELLED | OUTPATIENT
Start: 2024-04-14

## 2024-04-14 RX ORDER — SODIUM CHLORIDE 9 MG/ML
INJECTION, SOLUTION INTRAVENOUS PRN
Status: CANCELLED | OUTPATIENT
Start: 2024-04-14

## 2024-04-14 RX ORDER — TETRACAINE HYDROCHLORIDE 5 MG/ML
1 SOLUTION OPHTHALMIC SEE ADMIN INSTRUCTIONS
Status: CANCELLED | OUTPATIENT
Start: 2024-04-14

## 2024-04-15 ENCOUNTER — HOSPITAL ENCOUNTER (OUTPATIENT)
Age: 69
Setting detail: OUTPATIENT SURGERY
Discharge: HOME | End: 2024-04-15
Attending: STUDENT IN AN ORGANIZED HEALTH CARE EDUCATION/TRAINING PROGRAM | Admitting: STUDENT IN AN ORGANIZED HEALTH CARE EDUCATION/TRAINING PROGRAM
Payer: MEDICARE

## 2024-04-15 ENCOUNTER — ANESTHESIA (OUTPATIENT)
Dept: SURGERY | Age: 69
End: 2024-04-15
Payer: MEDICARE

## 2024-04-15 VITALS
HEART RATE: 63 BPM | RESPIRATION RATE: 16 BRPM | TEMPERATURE: 98 F | DIASTOLIC BLOOD PRESSURE: 96 MMHG | WEIGHT: 194 LBS | OXYGEN SATURATION: 100 % | SYSTOLIC BLOOD PRESSURE: 138 MMHG | HEIGHT: 63 IN | BODY MASS INDEX: 34.38 KG/M2

## 2024-04-15 PROCEDURE — 2500000003 HC RX 250 WO HCPCS: Performed by: STUDENT IN AN ORGANIZED HEALTH CARE EDUCATION/TRAINING PROGRAM

## 2024-04-15 PROCEDURE — 3700000000 HC ANESTHESIA ATTENDED CARE: Performed by: STUDENT IN AN ORGANIZED HEALTH CARE EDUCATION/TRAINING PROGRAM

## 2024-04-15 PROCEDURE — 2580000003 HC RX 258: Performed by: STUDENT IN AN ORGANIZED HEALTH CARE EDUCATION/TRAINING PROGRAM

## 2024-04-15 PROCEDURE — V2632 POST CHMBR INTRAOCULAR LENS: HCPCS | Performed by: STUDENT IN AN ORGANIZED HEALTH CARE EDUCATION/TRAINING PROGRAM

## 2024-04-15 PROCEDURE — 6360000002 HC RX W HCPCS: Performed by: STUDENT IN AN ORGANIZED HEALTH CARE EDUCATION/TRAINING PROGRAM

## 2024-04-15 PROCEDURE — 3600000014 HC SURGERY LEVEL 4 ADDTL 15MIN: Performed by: STUDENT IN AN ORGANIZED HEALTH CARE EDUCATION/TRAINING PROGRAM

## 2024-04-15 PROCEDURE — 6370000000 HC RX 637 (ALT 250 FOR IP): Performed by: STUDENT IN AN ORGANIZED HEALTH CARE EDUCATION/TRAINING PROGRAM

## 2024-04-15 PROCEDURE — 6360000002 HC RX W HCPCS

## 2024-04-15 PROCEDURE — 3700000001 HC ADD 15 MINUTES (ANESTHESIA): Performed by: STUDENT IN AN ORGANIZED HEALTH CARE EDUCATION/TRAINING PROGRAM

## 2024-04-15 PROCEDURE — 3600000004 HC SURGERY LEVEL 4 BASE: Performed by: STUDENT IN AN ORGANIZED HEALTH CARE EDUCATION/TRAINING PROGRAM

## 2024-04-15 PROCEDURE — 2709999900 HC NON-CHARGEABLE SUPPLY: Performed by: STUDENT IN AN ORGANIZED HEALTH CARE EDUCATION/TRAINING PROGRAM

## 2024-04-15 PROCEDURE — 7100000010 HC PHASE II RECOVERY - FIRST 15 MIN: Performed by: STUDENT IN AN ORGANIZED HEALTH CARE EDUCATION/TRAINING PROGRAM

## 2024-04-15 DEVICE — IMPLANTABLE DEVICE: Type: IMPLANTABLE DEVICE | Site: EYE | Status: FUNCTIONAL

## 2024-04-15 RX ORDER — CYCLOPENTOLATE HYDROCHLORIDE 10 MG/ML
1 SOLUTION/ DROPS OPHTHALMIC SEE ADMIN INSTRUCTIONS
Status: DISCONTINUED | OUTPATIENT
Start: 2024-04-15 | End: 2024-04-15 | Stop reason: HOSPADM

## 2024-04-15 RX ORDER — SODIUM CHLORIDE 9 MG/ML
INJECTION, SOLUTION INTRAVENOUS PRN
Status: DISCONTINUED | OUTPATIENT
Start: 2024-04-15 | End: 2024-04-15 | Stop reason: HOSPADM

## 2024-04-15 RX ORDER — CIPROFLOXACIN HYDROCHLORIDE 3.5 MG/ML
1 SOLUTION/ DROPS TOPICAL SEE ADMIN INSTRUCTIONS
Status: COMPLETED | OUTPATIENT
Start: 2024-04-15 | End: 2024-04-15

## 2024-04-15 RX ORDER — PREDNISOLONE ACETATE 10 MG/ML
SUSPENSION/ DROPS OPHTHALMIC
Status: COMPLETED | OUTPATIENT
Start: 2024-04-15 | End: 2024-04-15

## 2024-04-15 RX ORDER — SODIUM CHLORIDE 0.9 % (FLUSH) 0.9 %
5-40 SYRINGE (ML) INJECTION PRN
Status: DISCONTINUED | OUTPATIENT
Start: 2024-04-15 | End: 2024-04-15 | Stop reason: HOSPADM

## 2024-04-15 RX ORDER — PHENYLEPHRINE HYDROCHLORIDE 25 MG/ML
1 SOLUTION/ DROPS OPHTHALMIC SEE ADMIN INSTRUCTIONS
Status: COMPLETED | OUTPATIENT
Start: 2024-04-15 | End: 2024-04-15

## 2024-04-15 RX ORDER — TROPICAMIDE 10 MG/ML
1 SOLUTION/ DROPS OPHTHALMIC SEE ADMIN INSTRUCTIONS
Status: COMPLETED | OUTPATIENT
Start: 2024-04-15 | End: 2024-04-15

## 2024-04-15 RX ORDER — BRIMONIDINE TARTRATE 2 MG/ML
SOLUTION/ DROPS OPHTHALMIC
Status: COMPLETED | OUTPATIENT
Start: 2024-04-15 | End: 2024-04-15

## 2024-04-15 RX ORDER — FENTANYL CITRATE 50 UG/ML
INJECTION, SOLUTION INTRAMUSCULAR; INTRAVENOUS PRN
Status: DISCONTINUED | OUTPATIENT
Start: 2024-04-15 | End: 2024-04-15 | Stop reason: SDUPTHER

## 2024-04-15 RX ORDER — SODIUM CHLORIDE 0.9 % (FLUSH) 0.9 %
5-40 SYRINGE (ML) INJECTION EVERY 12 HOURS SCHEDULED
Status: DISCONTINUED | OUTPATIENT
Start: 2024-04-15 | End: 2024-04-15 | Stop reason: HOSPADM

## 2024-04-15 RX ORDER — FAMOTIDINE 10 MG/ML
20 INJECTION, SOLUTION INTRAVENOUS
Status: COMPLETED | OUTPATIENT
Start: 2024-04-15 | End: 2024-04-15

## 2024-04-15 RX ORDER — KETOROLAC TROMETHAMINE 5 MG/ML
1 SOLUTION OPHTHALMIC SEE ADMIN INSTRUCTIONS
Status: COMPLETED | OUTPATIENT
Start: 2024-04-15 | End: 2024-04-15

## 2024-04-15 RX ORDER — MIDAZOLAM HYDROCHLORIDE 1 MG/ML
INJECTION INTRAMUSCULAR; INTRAVENOUS PRN
Status: DISCONTINUED | OUTPATIENT
Start: 2024-04-15 | End: 2024-04-15 | Stop reason: SDUPTHER

## 2024-04-15 RX ORDER — ONDANSETRON 2 MG/ML
4 INJECTION INTRAMUSCULAR; INTRAVENOUS
Status: COMPLETED | OUTPATIENT
Start: 2024-04-15 | End: 2024-04-15

## 2024-04-15 RX ORDER — TETRACAINE HYDROCHLORIDE 5 MG/ML
1 SOLUTION OPHTHALMIC SEE ADMIN INSTRUCTIONS
Status: DISCONTINUED | OUTPATIENT
Start: 2024-04-15 | End: 2024-04-15 | Stop reason: HOSPADM

## 2024-04-15 RX ORDER — TETRACAINE HYDROCHLORIDE 5 MG/ML
SOLUTION OPHTHALMIC
Status: COMPLETED | OUTPATIENT
Start: 2024-04-15 | End: 2024-04-15

## 2024-04-15 RX ORDER — BALANCED SALT SOLUTION 6.4; .75; .48; .3; 3.9; 1.7 MG/ML; MG/ML; MG/ML; MG/ML; MG/ML; MG/ML
SOLUTION OPHTHALMIC
Status: COMPLETED | OUTPATIENT
Start: 2024-04-15 | End: 2024-04-15

## 2024-04-15 RX ORDER — OFLOXACIN 3 MG/ML
SOLUTION/ DROPS OPHTHALMIC
Status: COMPLETED | OUTPATIENT
Start: 2024-04-15 | End: 2024-04-15

## 2024-04-15 RX ADMIN — TETRACAINE HYDROCHLORIDE 1 DROP: 5 SOLUTION OPHTHALMIC at 10:20

## 2024-04-15 RX ADMIN — PHENYLEPHRINE HYDROCHLORIDE 1 DROP: 25 SOLUTION/ DROPS OPHTHALMIC at 10:24

## 2024-04-15 RX ADMIN — TROPICAMIDE 1 DROP: 10 SOLUTION/ DROPS OPHTHALMIC at 10:24

## 2024-04-15 RX ADMIN — TROPICAMIDE 1 DROP: 10 SOLUTION/ DROPS OPHTHALMIC at 10:25

## 2024-04-15 RX ADMIN — TROPICAMIDE 1 DROP: 10 SOLUTION/ DROPS OPHTHALMIC at 10:20

## 2024-04-15 RX ADMIN — KETOROLAC TROMETHAMINE 1 DROP: 5 SOLUTION/ DROPS OPHTHALMIC at 10:20

## 2024-04-15 RX ADMIN — ONDANSETRON 4 MG: 2 INJECTION INTRAMUSCULAR; INTRAVENOUS at 10:46

## 2024-04-15 RX ADMIN — FENTANYL CITRATE 25 MCG: 50 INJECTION INTRAMUSCULAR; INTRAVENOUS at 11:33

## 2024-04-15 RX ADMIN — PHENYLEPHRINE HYDROCHLORIDE 1 DROP: 25 SOLUTION/ DROPS OPHTHALMIC at 10:20

## 2024-04-15 RX ADMIN — PHENYLEPHRINE HYDROCHLORIDE 1 DROP: 25 SOLUTION/ DROPS OPHTHALMIC at 10:25

## 2024-04-15 RX ADMIN — CIPROFLOXACIN 1 DROP: 3 SOLUTION OPHTHALMIC at 10:20

## 2024-04-15 RX ADMIN — SODIUM CHLORIDE: 9 INJECTION, SOLUTION INTRAVENOUS at 10:25

## 2024-04-15 RX ADMIN — FAMOTIDINE 20 MG: 10 INJECTION, SOLUTION INTRAVENOUS at 10:45

## 2024-04-15 RX ADMIN — MIDAZOLAM 1 MG: 1 INJECTION INTRAMUSCULAR; INTRAVENOUS at 11:28

## 2024-04-15 RX ADMIN — MIDAZOLAM 1 MG: 1 INJECTION INTRAMUSCULAR; INTRAVENOUS at 11:27

## 2024-04-15 ASSESSMENT — PAIN - FUNCTIONAL ASSESSMENT
PAIN_FUNCTIONAL_ASSESSMENT: 0-10
PAIN_FUNCTIONAL_ASSESSMENT: NONE - DENIES PAIN

## 2024-04-15 NOTE — OP NOTE
Paty Win    OPERATIVE NOTE    Preoperative Diagnosis: Visually significant cataract right eye    Postoperative Diagnosis: Visually significant cataract right eye    Procedure: Phacoemulsification with intraocular lens implantation, right eye    Surgeon: Carlos Luna MD, FRANCHESKA    Anesthesia: MAC, topical.    Complications: none    Estimated blood loss: less than 1 ml.    Specimens: none    Indications for procedure:  The patient is a 68 y.o. year old who has experienced painless, progressive deterioration in vision which corresponds with increasing lenticular opacification of the right eye. This is contributing to an overall decline in visual functioning and interfering with activities of daily living as described in the medical record. After discussion of relevant indications, risks, benefits, alternatives, and limitations, the patient consented to proceed with cataract phacoemulsification with lens implantation for visual rehabilitation.     Details of the procedure:  Following informed consent, the patient was taken to the operating room and placed in the supine position.  Monitored anesthesia care was administered.  The patient's name, eye, intraocular lens model and power were verified (time-out). The patient was positioned under the operative microscope and the operative eye was prepped and draped in the usual sterile fashion using aseptic technique for cataract surgery. A wire lid speculum was placed. A peripheral paracentesis was made using a 1.1 mm blade. 2% preservative free lidocaine with dilute 1:1000 preservative free epinephrine (Epi-Shugarcaine) was injected through the side port incision for topical anesthesia. The anterior chamber was deepened with viscoelastic. A tri-planar temporal clear cornea incision was made with a 2.4 mm keratome. A continuous curvilinear capsulorrhexis was initiated with a cystotome and completed using Utrata forceps. Gentle hydrodissection and hydrodelineation were

## 2024-04-15 NOTE — DISCHARGE INSTRUCTIONS
Carlos Luna MD, FRANCHESKA    POST OPERATIVE INSTRUCTIONS FOR CATARACT SURGERY    Left / Right   Eye       Starting the DAY of surgery use all drops as directed. Please locate the name of the individual medication you were prescribed below and follow the instructions for its use.      ANTIBIOTIC: (CAROLINA Cap): Your drop will be either Vigamox (moxifloxacin), Besivance or Polytrim.     Instill 4 drops a day for 1 week.        NSAID (GRAY Cap): Your drop will be BROMSITE, PROLENSA or KETOROLAC.    Instill 1 drop daily for 4 weeks.  Patients who receive KETOROLAC: Instill 1 drop 2 times a day for 2 weeks.         STEROID (PINK Cap): Your steroid drop will be PREDNISOLONE ACETATE, INVELTYS OR LOTEMAX SM.    Instill 4 drops a day for 1 week,   3 drops a day for 1 week,   2 drops a day for 1 week,  1 drop a day for 1 week.        Please bring ALL of your eye drops with you to surgery and all follow-up appointments.  Wait at least 3-5 minutes between eye drops. It's normal for some drops to briefly sting.        POST OPERATIVE CATARACT DROP SCHEDULE    Week 1 Day 1 Day 2 Day 3 Day 4 Day 5 Day 6 Day 7   VIGAMOX,  BESIVANCE or POLYTRIM  (Tan) ? ?  ? ? ? ?  ? ? ? ?  ? ? ? ?  ? ? ? ?  ? ? ? ?  ? ?   ? ?  ? ?     BROMSITE,  PROLENSA or KETOROLAC  (Gray) ? ? ? ? ? ? ?   PREDNISOLNE, INVELTYS or LOTEMAX SM  (Pink) ? ?  ? ? ? ?  ? ? ? ?  ? ? ? ?  ? ? ? ?  ? ? ? ?  ? ? ? ?  ? ?     Week 2 Day 8 Day 9 Day 10 Day 11 Day 12 Day 13 Day 14   BROMSITE, PROLENSA or  KETOROLAC  (Gray) ? ? ? ? ? ? ?   PREDNISOLNE, INVELTYS or LOTEMAX SM   (Pink) ? ? ? ? ? ? ? ? ? ? ? ? ? ? ? ? ? ? ? ? ?     Week 3 Day 15 Day 16 Day 17 Day 18 Day 19 Day 20 Day 21   BROMSITE, PROLENSA or  (York) ? ? ? ? ? ? ?   PREDNISOLNE, INVELTYS or LOTEMAX SM   (Pink) ? ? ? ? ? ? ? ? ? ? ? ? ? ?     Week 4 Day 22 Day 23 Day 24 Day 25 Day 26 Day 2 Day 28   BROMSITE, PROLENSA or  (York) ? ? ? ? ? ? ?   PREDNISOLNE, INVELTYS or LOTEMAX SM    YOB: 2011  Location: [facility]  Location Address: [address] [place] [address], [facility], [address] [place] [address]  Location Phone: [phone]    Chief Complaint   Patient presents with   • Ear Problem       History of Present Illness  Lj Sotelo is a 5 y.o. male.  Lj Sotelo is here for evaluation of ENT complaints. The patient has had problems with ear infections  The symptoms are not localized to a particular location. The patient has had moderate symptoms. The symptoms have been present for the last several months . The symptoms are aggravated by  no identifiable factors . The symptoms are improved by no identifieable factors.  He is positive for nasal congestion, mild snoring.  He has had another ear infection two weeks ago.  He was lost to followup after his last visit.  He presents today with dad who is not clear on all of his symptoms/problems.  Does not recall if she snores since he sleeps at other end of house.         Progress Notes  Encounter Date: 2017  [facility]   [facility]      []Hide copied text              Procedure visit     2017  [facility]    Leonid Linares, [facility]   Audiology    ETD (eustachian tube dysfunction), bilateral   Dx    Ear Problem, Hearing Loss   Reason for visit    Progress Notes                   Past Medical History:   Diagnosis Date   • Chronic otitis media        History reviewed. No pertinent surgical history.      Current Outpatient Prescriptions:   •  methylphenidate (CONCERTA) 27 MG CR tablet, , Disp: , Rfl:   •  Multiple Vitamins-Minerals (MULTIVITAL PO), Take  by mouth., Disp: , Rfl:   •  Omega-3 Fatty Acids (FISH OIL) 1000 MG capsule capsule, Take  by mouth Daily With Breakfast., Disp: , Rfl:   •  amoxicillin-clavulanate (AUGMENTIN) 400-57 MG/5ML suspension, 6 ml po bid x 10 days, Disp: 120 mL, Rfl: 0  •  fluticasone (FLONASE) 50 MCG/ACT nasal spray, 1 spray into each nostril Daily, Disp: 16 g, Rfl: 5  •  Loratadine 5  MG/5ML solution, Take 2.5 mL by mouth Every Night., Disp: 75 mL, Rfl: 3  •  saccharomyces boulardii (FLORASTOR) 250 MG capsule, Take 250 mg by mouth 2 (Two) Times a Day., Disp: , Rfl:     Review of patient's allergies indicates no known allergies.    Family History   Problem Relation Age of Onset   • Diabetes Father    • Heart disease Father        Social History     Social History   • Marital status: Single     Spouse name: N/A   • Number of children: N/A   • Years of education: N/A     Occupational History   • Not on file.     Social History Main Topics   • Smoking status: Never Smoker   • Smokeless tobacco: Never Used   • Alcohol use Not on file   • Drug use: Not on file   • Sexual activity: Not on file     Other Topics Concern   • Not on file     Social History Narrative       Review of Systems   Constitutional: Negative.    HENT:        See HPI   Eyes: Negative.    Respiratory: Negative.    Cardiovascular: Negative.    Gastrointestinal: Negative.    Endocrine: Negative.    Genitourinary: Negative.    Musculoskeletal: Negative.    Skin: Negative.    Allergic/Immunologic: Negative.    Neurological: Negative.    Psychiatric/Behavioral: Negative.        Vitals:    12/05/17 1114   Pulse: 86   Temp: 97.9 °F (36.6 °C)   SpO2: 98%       Objective     Physical Exam  CONSTITUTIONAL: well nourished, alert,in no acute distress     COMMUNICATION AND VOICE: able to communicate normally, normal voice quality    HEAD: normocephalic, no lesions, atraumatic, no tenderness, no masses     FACE: appearance normal, no lesions, no tenderness, no deformities, facial motion symmetric    SALIVARY GLANDS: parotid glands with no tenderness, no swelling, no masses, submandibular glands with normal size, nontender    EYES: ocular motility normal, eyelids normal, orbits normal, no proptosis, conjunctiva normal , pupils equal, round     EARS:  Hearing: response to conversational voice normal bilaterally   External Ears: auricles without  lesions  Otoscopic: bilateral TMs dull, bilateral EACs normal to inspection    NOSE:  External Nose: structure normal, no tenderness on palpation, no nasal discharge, no lesions, no evidence of trauma, nostrils patent   Intranasal Exam: nasal mucosa edema, vestibule within normal limits, inferior turbinate hypertrophy, nasal septum midline     ORAL:  Lips: upper and lower lips without lesion   Teeth: dentition within normal limits for age   Gums: gingivae healthy   Oral Mucosa: oral mucosa normal, no mucosal lesions   Floor of Mouth: Warthin’s duct patent, mucosa normal  Tongue: lingual mucosa normal without lesions, normal tongue mobility   Palate: soft and hard palates with normal mucosa and structure  Oropharynx: oropharyngeal mucosa normal    NECK: neck appearance normal, no mass,  noted without erythema or tenderness      LYMPH NODES: no lymphadenopathy    CHEST/RESPIRATORY: respiratory effort normal    CARDIOVASCULAR:  extremities without cyanosis or edema      NEUROLOGIC/PSYCHIATRIC: mood normal, affect appropriate, CN II-XII intact grossly    Assessment/Plan   Lj was seen today for ear problem.    Diagnoses and all orders for this visit:    Conductive hearing loss, bilateral    Adenoids, hypertrophy    ETD (Eustachian tube dysfunction), bilateral    Chronic mucoid otitis media of both ears    Snoring    Other orders  -     amoxicillin-clavulanate (AUGMENTIN) 400-57 MG/5ML suspension; 6 ml po bid x 10 days  -     fluticasone (FLONASE) 50 MCG/ACT nasal spray; 1 spray into each nostril Daily    * Surgery not found *  No orders of the defined types were placed in this encounter.    Return in about 4 weeks (around 1/2/2018).       Patient Instructions   Medical vs surgical options discussed    Call for problems or worsening symptoms    Dad prefers to discuss surgery with mom and let us know  Recommend adnoidectomy/BMT

## 2024-04-15 NOTE — ANESTHESIA PRE PROCEDURE
Department of Anesthesiology  Preprocedure Note       Name:  Paty Win   Age:  68 y.o.  :  1955                                          MRN:  [id number]         Date:  4/15/2024      Surgeon: Surgeon(s):  Carlos Luna MD    Procedure: Procedure(s):  CATARACT EXTRACTION WITH PHACOEMULSIFICATION WITH INTRAOCULAR LENS IMPLANT - RIGHT EYE    Medications prior to admission:   Prior to Admission medications    Medication Sig Start Date End Date Taking? Authorizing Provider   amLODIPine (NORVASC) 10 MG tablet TAKE 1 TABLET BY MOUTH DAILY 24   Bert Fernandez MD   losartan (COZAAR) 50 MG tablet TAKE 1 TABLET BY MOUTH DAILY 24   Bert Fernandez MD   pravastatin (PRAVACHOL) 20 MG tablet Take 1 tablet by mouth every evening 24   Bert Fernandez MD   potassium chloride (KLOR-CON M) 20 MEQ extended release tablet Take 1 tablet by mouth daily 3/12/24   Bert Fernandez MD   Multiple Vitamins-Minerals (THERAPEUTIC MULTIVITAMIN-MINERALS) tablet Take 1 tablet by mouth daily    ProviderLacie MD   clotrimazole-betamethasone (LOTRISONE) 1-0.05 % cream Apply topically 2 times daily. 23   Bert Fernandez MD   Handicap Placard MISC by Does not apply route Valid 2022 till 2027   Bert Fernandez MD   loratadine (CLARITIN) 10 MG capsule Take 1 capsule by mouth as needed    Lacie Weber MD   omeprazole 20 MG EC tablet Take 1 tablet by mouth at bedtime    ProviderLacie MD       Current medications:    Current Facility-Administered Medications   Medication Dose Route Frequency Provider Last Rate Last Admin    sodium chloride flush 0.9 % injection 5-40 mL  5-40 mL IntraVENous 2 times per day Errol De Jesus MD        sodium chloride flush 0.9 % injection 5-40 mL  5-40 mL IntraVENous PRN Errol De Jesus MD        0.9 % sodium chloride infusion   IntraVENous PRN Errol De Jesus MD        ondansetron (ZOFRAN) injection 4 mg  4 mg IntraVENous NOW Mendez Beckham MD        famotidine

## 2024-04-15 NOTE — ANESTHESIA POSTPROCEDURE EVALUATION
Department of Anesthesiology  Postprocedure Note    Patient: Paty Win  MRN: [id number]  YOB: 1955  Date of evaluation: 4/15/2024    Procedure Summary       Date: 04/15/24 Room / Location: [address]    Anesthesia Start: 1124 Anesthesia Stop: 1141    Procedure: CATARACT EXTRACTION WITH PHACOEMULSIFICATION WITH INTRAOCULAR LENS IMPLANT - RIGHT EYE (Right: Eye) Diagnosis:       Nuclear sclerotic cataract of right eye      (Nuclear sclerotic cataract of right eye [H25.11])    Surgeons: Carlos Luna MD Responsible Provider: Mendez Beckham MD    Anesthesia Type: MAC ASA Status: 3            Anesthesia Type: MAC    Mere Phase I: Mere Score: 10    Mere Phase II: Mere Score: 10    Anesthesia Post Evaluation    Patient location during evaluation: PACU  Patient participation: complete - patient participated  Level of consciousness: awake  Airway patency: patent  Nausea & Vomiting: no nausea and no vomiting  Cardiovascular status: hemodynamically stable and blood pressure returned to baseline  Respiratory status: spontaneous ventilation and nonlabored ventilation  Hydration status: stable  Comments: Uneventful MAC anesthetic. Ms. Win was seen resting comfortably following her procedure. Appropriate for discharge home with .   Pain management: adequate    No notable events documented.  
no

## 2024-04-18 ENCOUNTER — HOSPITAL ENCOUNTER (OUTPATIENT)
Dept: CT IMAGING | Age: 69
Discharge: HOME | End: 2024-04-18
Attending: INTERNAL MEDICINE
Payer: MEDICARE

## 2024-04-18 DIAGNOSIS — K31.84 GASTROPARESIS: ICD-10-CM

## 2024-04-18 PROCEDURE — 6360000004 HC RX CONTRAST MEDICATION: Performed by: INTERNAL MEDICINE

## 2024-04-18 PROCEDURE — 74177 CT ABD & PELVIS W/CONTRAST: CPT

## 2024-04-18 RX ADMIN — IOPAMIDOL 75 ML: 755 INJECTION, SOLUTION INTRAVENOUS at 15:55

## 2024-04-18 RX ADMIN — DIATRIZOATE MEGLUMINE AND DIATRIZOATE SODIUM 20 ML: 660; 100 LIQUID ORAL; RECTAL at 15:55

## 2024-04-22 NOTE — PROGRESS NOTES
[facility] PRE-OPERATIVE INSTRUCTIONS     Arrival time:  1010 Surgery time:1140      Take the following medications with a sip of water:  Follow your MD/Surgeons pre-procedure instructions regarding your medications     Do not eat or drink anything after 12:00 midnight prior to your surgery.  This includes water chewing gum, mints and ice chips.   You may brush your teeth and gargle the morning of your surgery, but do not swallow the water     Please see your family doctor/pediatrician for a history and physical and/or concerning medications.   Bring any test results/reports from your physicians office.   If you are under the care of a heart doctor or specialist doctor, please be aware that you may be asked to them for clearance    You may be asked to stop blood thinners such as Coumadin, Plavix, Fragmin, Lovenox, etc., or any anti-inflammatories such as:  Aspirin, Ibuprofen, Advil, Naproxen prior to your surgery.    We also ask that you stop any OTC medications such as fish oil, vitamin E, glucosamine, garlic, Multivitamins, COQ 10, etc.    We ask that you do not smoke 24 hours prior to surgery  We ask that you do not  drink any alcoholic beverages 24 hours prior to surgery     You must make arrangements for a responsible adult to take you home after your surgery.    For your safety you will not be allowed to leave alone or drive yourself home.  Your surgery will be cancelled if you do not have a ride home.     Also for your safety, it is strongly suggested that someone stay with you the first 24 hours after your surgery.     A parent or legal guardian must accompany a child scheduled for surgery and plan to stay at the hospital until the child is discharged.    Please do not bring other children with you.    For your comfort, please wear simple loose fitting clothing to the hospital.  Please do not bring valuables.    Do not wear any make-up or nail polish on your fingers or toes      For your safety,

## 2024-04-26 ENCOUNTER — ANESTHESIA EVENT (OUTPATIENT)
Dept: SURGERY | Age: 69
End: 2024-04-26
Payer: MEDICARE

## 2024-04-26 NOTE — PRE-PROCEDURE INSTRUCTIONS
[address] [facility] [address]  [phone]        Pre-Op Phone Call:     Patient Name: Paty Win     Telephone Information:   Mobile [phone]     Home phone:  [phone]    Surgery Time:   11:40 AM     Arrival Time:  1010     Left extended Message:  Yes     Message left with: spoke with patient     Recent change in health status:  No     Advised of transportation/ policy:  Yes     NPO policy reviewed:  Yes     Advised to take morning heart/blood pressure medications with sips of water morning of surgery?  Yes     Instructed to bring eye drops, photo identification, and insurance card day of surgery?  Yes     Advised to wear short sleeved button down shirt (no T-shirt underneath):  Yes     Advised not to wear jewelry, hairpins, or pantyhose day of surgery?  Yes     Advised not to wear make-up and to wash face day of surgery?  Yes    Remarks: states understanding        Electronically signed by:  Dahiana Newby RN at 4/26/2024 12:15 PM

## 2024-04-28 RX ORDER — CIPROFLOXACIN HYDROCHLORIDE 3.5 MG/ML
1 SOLUTION/ DROPS TOPICAL SEE ADMIN INSTRUCTIONS
Status: CANCELLED | OUTPATIENT
Start: 2024-04-28

## 2024-04-28 RX ORDER — SODIUM CHLORIDE 0.9 % (FLUSH) 0.9 %
5-40 SYRINGE (ML) INJECTION PRN
Status: CANCELLED | OUTPATIENT
Start: 2024-04-28

## 2024-04-28 RX ORDER — PHENYLEPHRINE HYDROCHLORIDE 25 MG/ML
1 SOLUTION/ DROPS OPHTHALMIC SEE ADMIN INSTRUCTIONS
Status: CANCELLED | OUTPATIENT
Start: 2024-04-28

## 2024-04-28 RX ORDER — CYCLOPENTOLATE HYDROCHLORIDE 10 MG/ML
1 SOLUTION/ DROPS OPHTHALMIC PRN
Status: CANCELLED | OUTPATIENT
Start: 2024-04-28

## 2024-04-28 RX ORDER — SODIUM CHLORIDE 9 MG/ML
INJECTION, SOLUTION INTRAVENOUS PRN
Status: CANCELLED | OUTPATIENT
Start: 2024-04-28

## 2024-04-28 RX ORDER — TETRACAINE HYDROCHLORIDE 5 MG/ML
1 SOLUTION OPHTHALMIC SEE ADMIN INSTRUCTIONS
Status: CANCELLED | OUTPATIENT
Start: 2024-04-28

## 2024-04-28 RX ORDER — KETOROLAC TROMETHAMINE 5 MG/ML
1 SOLUTION OPHTHALMIC SEE ADMIN INSTRUCTIONS
Status: CANCELLED | OUTPATIENT
Start: 2024-04-28

## 2024-04-28 RX ORDER — TROPICAMIDE 10 MG/ML
1 SOLUTION/ DROPS OPHTHALMIC SEE ADMIN INSTRUCTIONS
Status: CANCELLED | OUTPATIENT
Start: 2024-04-28

## 2024-04-28 RX ORDER — SODIUM CHLORIDE 0.9 % (FLUSH) 0.9 %
5-40 SYRINGE (ML) INJECTION EVERY 12 HOURS SCHEDULED
Status: CANCELLED | OUTPATIENT
Start: 2024-04-28

## 2024-04-28 NOTE — ANESTHESIA PRE PROCEDURE
Department of Anesthesiology  Preprocedure Note       Name:  Paty Win   Age:  68 y.o.  :  1955                                          MRN:  [id number]         Date:  2024      Surgeon: Surgeon(s):  Carlos Luna MD    Procedure: Procedure(s):  PHACOEMULSIFICATION WITH INTRAOCULAR LENS IMPLANT - LEFT EYE    Medications prior to admission:   Prior to Admission medications    Medication Sig Start Date End Date Taking? Authorizing Provider   amLODIPine (NORVASC) 10 MG tablet TAKE 1 TABLET BY MOUTH DAILY 24   Bert Fernandez MD   losartan (COZAAR) 50 MG tablet TAKE 1 TABLET BY MOUTH DAILY 24   Bert Fernandez MD   pravastatin (PRAVACHOL) 20 MG tablet Take 1 tablet by mouth every evening 24   Bert Fernandez MD   potassium chloride (KLOR-CON M) 20 MEQ extended release tablet Take 1 tablet by mouth daily 3/12/24   Bert Fernandez MD   Multiple Vitamins-Minerals (THERAPEUTIC MULTIVITAMIN-MINERALS) tablet Take 1 tablet by mouth daily    ProviderLacie MD   clotrimazole-betamethasone (LOTRISONE) 1-0.05 % cream Apply topically 2 times daily. 23   Bert Fernandez MD   Handicap Placard MISC by Does not apply route Valid 2022 till 2027   Bert Fernandez MD   loratadine (CLARITIN) 10 MG capsule Take 1 capsule by mouth as needed    ProviderLacie MD   omeprazole 20 MG EC tablet Take 1 tablet by mouth at bedtime    ProviderLacie MD       Current medications:    Current Facility-Administered Medications   Medication Dose Route Frequency Provider Last Rate Last Admin    sodium chloride flush 0.9 % injection 5-40 mL  5-40 mL IntraVENous 2 times per day Franklin Qureshi MD        sodium chloride flush 0.9 % injection 5-40 mL  5-40 mL IntraVENous PRN Franklin Qureshi MD        0.9 % sodium chloride infusion   IntraVENous PRN Franklin Qureshi MD        sodium chloride flush 0.9 % injection 5-40 mL  5-40 mL IntraVENous 2 times per day Carlos Luna MD        sodium

## 2024-04-29 ENCOUNTER — HOSPITAL ENCOUNTER (OUTPATIENT)
Age: 69
Setting detail: OUTPATIENT SURGERY
Discharge: HOME | End: 2024-04-29
Attending: STUDENT IN AN ORGANIZED HEALTH CARE EDUCATION/TRAINING PROGRAM | Admitting: STUDENT IN AN ORGANIZED HEALTH CARE EDUCATION/TRAINING PROGRAM
Payer: MEDICARE

## 2024-04-29 ENCOUNTER — ANESTHESIA (OUTPATIENT)
Dept: SURGERY | Age: 69
End: 2024-04-29
Payer: MEDICARE

## 2024-04-29 VITALS
HEART RATE: 66 BPM | SYSTOLIC BLOOD PRESSURE: 128 MMHG | RESPIRATION RATE: 18 BRPM | BODY MASS INDEX: 35.43 KG/M2 | TEMPERATURE: 98.2 F | WEIGHT: 200 LBS | DIASTOLIC BLOOD PRESSURE: 60 MMHG | OXYGEN SATURATION: 100 %

## 2024-04-29 PROCEDURE — 6370000000 HC RX 637 (ALT 250 FOR IP): Performed by: STUDENT IN AN ORGANIZED HEALTH CARE EDUCATION/TRAINING PROGRAM

## 2024-04-29 PROCEDURE — 6360000002 HC RX W HCPCS: Performed by: STUDENT IN AN ORGANIZED HEALTH CARE EDUCATION/TRAINING PROGRAM

## 2024-04-29 PROCEDURE — 6360000002 HC RX W HCPCS: Performed by: NURSE ANESTHETIST, CERTIFIED REGISTERED

## 2024-04-29 PROCEDURE — 3700000000 HC ANESTHESIA ATTENDED CARE: Performed by: STUDENT IN AN ORGANIZED HEALTH CARE EDUCATION/TRAINING PROGRAM

## 2024-04-29 PROCEDURE — 2500000003 HC RX 250 WO HCPCS: Performed by: STUDENT IN AN ORGANIZED HEALTH CARE EDUCATION/TRAINING PROGRAM

## 2024-04-29 PROCEDURE — 3600000014 HC SURGERY LEVEL 4 ADDTL 15MIN: Performed by: STUDENT IN AN ORGANIZED HEALTH CARE EDUCATION/TRAINING PROGRAM

## 2024-04-29 PROCEDURE — 3700000001 HC ADD 15 MINUTES (ANESTHESIA): Performed by: STUDENT IN AN ORGANIZED HEALTH CARE EDUCATION/TRAINING PROGRAM

## 2024-04-29 PROCEDURE — 3600000004 HC SURGERY LEVEL 4 BASE: Performed by: STUDENT IN AN ORGANIZED HEALTH CARE EDUCATION/TRAINING PROGRAM

## 2024-04-29 PROCEDURE — 2580000003 HC RX 258: Performed by: STUDENT IN AN ORGANIZED HEALTH CARE EDUCATION/TRAINING PROGRAM

## 2024-04-29 PROCEDURE — 2709999900 HC NON-CHARGEABLE SUPPLY: Performed by: STUDENT IN AN ORGANIZED HEALTH CARE EDUCATION/TRAINING PROGRAM

## 2024-04-29 PROCEDURE — V2632 POST CHMBR INTRAOCULAR LENS: HCPCS | Performed by: STUDENT IN AN ORGANIZED HEALTH CARE EDUCATION/TRAINING PROGRAM

## 2024-04-29 PROCEDURE — 7100000010 HC PHASE II RECOVERY - FIRST 15 MIN: Performed by: STUDENT IN AN ORGANIZED HEALTH CARE EDUCATION/TRAINING PROGRAM

## 2024-04-29 DEVICE — ENVISTA ONE-PIECE HYDROPHOBIC ACRYLIC INTRAOCULAR LENS +0.00D
Type: IMPLANTABLE DEVICE | Site: EYE | Status: FUNCTIONAL
Brand: ENVISTA® IOL

## 2024-04-29 RX ORDER — MIDAZOLAM HYDROCHLORIDE 1 MG/ML
INJECTION INTRAMUSCULAR; INTRAVENOUS PRN
Status: DISCONTINUED | OUTPATIENT
Start: 2024-04-29 | End: 2024-04-29 | Stop reason: SDUPTHER

## 2024-04-29 RX ORDER — OFLOXACIN 3 MG/ML
SOLUTION/ DROPS OPHTHALMIC
Status: COMPLETED | OUTPATIENT
Start: 2024-04-29 | End: 2024-04-29

## 2024-04-29 RX ORDER — TETRACAINE HYDROCHLORIDE 5 MG/ML
SOLUTION OPHTHALMIC
Status: COMPLETED | OUTPATIENT
Start: 2024-04-29 | End: 2024-04-29

## 2024-04-29 RX ORDER — SODIUM CHLORIDE 9 MG/ML
INJECTION, SOLUTION INTRAVENOUS PRN
Status: DISCONTINUED | OUTPATIENT
Start: 2024-04-29 | End: 2024-04-29 | Stop reason: HOSPADM

## 2024-04-29 RX ORDER — BALANCED SALT SOLUTION 6.4; .75; .48; .3; 3.9; 1.7 MG/ML; MG/ML; MG/ML; MG/ML; MG/ML; MG/ML
SOLUTION OPHTHALMIC
Status: COMPLETED | OUTPATIENT
Start: 2024-04-29 | End: 2024-04-29

## 2024-04-29 RX ORDER — SODIUM CHLORIDE 0.9 % (FLUSH) 0.9 %
5-40 SYRINGE (ML) INJECTION PRN
Status: DISCONTINUED | OUTPATIENT
Start: 2024-04-29 | End: 2024-04-29 | Stop reason: HOSPADM

## 2024-04-29 RX ORDER — CIPROFLOXACIN HYDROCHLORIDE 3.5 MG/ML
1 SOLUTION/ DROPS TOPICAL SEE ADMIN INSTRUCTIONS
Status: COMPLETED | OUTPATIENT
Start: 2024-04-29 | End: 2024-04-29

## 2024-04-29 RX ORDER — KETOROLAC TROMETHAMINE 5 MG/ML
1 SOLUTION OPHTHALMIC SEE ADMIN INSTRUCTIONS
Status: DISCONTINUED | OUTPATIENT
Start: 2024-04-29 | End: 2024-04-29 | Stop reason: HOSPADM

## 2024-04-29 RX ORDER — TROPICAMIDE 10 MG/ML
1 SOLUTION/ DROPS OPHTHALMIC SEE ADMIN INSTRUCTIONS
Status: DISCONTINUED | OUTPATIENT
Start: 2024-04-29 | End: 2024-04-29 | Stop reason: HOSPADM

## 2024-04-29 RX ORDER — CYCLOPENTOLATE HYDROCHLORIDE 10 MG/ML
1 SOLUTION/ DROPS OPHTHALMIC SEE ADMIN INSTRUCTIONS
Status: DISCONTINUED | OUTPATIENT
Start: 2024-04-29 | End: 2024-04-29 | Stop reason: HOSPADM

## 2024-04-29 RX ORDER — PREDNISOLONE ACETATE 10 MG/ML
SUSPENSION/ DROPS OPHTHALMIC
Status: COMPLETED | OUTPATIENT
Start: 2024-04-29 | End: 2024-04-29

## 2024-04-29 RX ORDER — ONDANSETRON 2 MG/ML
4 INJECTION INTRAMUSCULAR; INTRAVENOUS ONCE
Status: COMPLETED | OUTPATIENT
Start: 2024-04-29 | End: 2024-04-29

## 2024-04-29 RX ORDER — BRIMONIDINE TARTRATE 2 MG/ML
SOLUTION/ DROPS OPHTHALMIC
Status: COMPLETED | OUTPATIENT
Start: 2024-04-29 | End: 2024-04-29

## 2024-04-29 RX ORDER — PHENYLEPHRINE HYDROCHLORIDE 25 MG/ML
1 SOLUTION/ DROPS OPHTHALMIC SEE ADMIN INSTRUCTIONS
Status: DISCONTINUED | OUTPATIENT
Start: 2024-04-29 | End: 2024-04-29 | Stop reason: HOSPADM

## 2024-04-29 RX ORDER — SODIUM CHLORIDE 0.9 % (FLUSH) 0.9 %
5-40 SYRINGE (ML) INJECTION EVERY 12 HOURS SCHEDULED
Status: DISCONTINUED | OUTPATIENT
Start: 2024-04-29 | End: 2024-04-29 | Stop reason: HOSPADM

## 2024-04-29 RX ORDER — TETRACAINE HYDROCHLORIDE 5 MG/ML
1 SOLUTION OPHTHALMIC SEE ADMIN INSTRUCTIONS
Status: DISCONTINUED | OUTPATIENT
Start: 2024-04-29 | End: 2024-04-29 | Stop reason: HOSPADM

## 2024-04-29 RX ORDER — FENTANYL CITRATE 50 UG/ML
INJECTION, SOLUTION INTRAMUSCULAR; INTRAVENOUS PRN
Status: DISCONTINUED | OUTPATIENT
Start: 2024-04-29 | End: 2024-04-29 | Stop reason: SDUPTHER

## 2024-04-29 RX ORDER — FAMOTIDINE 10 MG/ML
20 INJECTION, SOLUTION INTRAVENOUS
Status: COMPLETED | OUTPATIENT
Start: 2024-04-29 | End: 2024-04-29

## 2024-04-29 RX ADMIN — CIPROFLOXACIN 1 DROP: 3 SOLUTION OPHTHALMIC at 10:52

## 2024-04-29 RX ADMIN — PHENYLEPHRINE HYDROCHLORIDE 1 DROP: 25 SOLUTION/ DROPS OPHTHALMIC at 10:55

## 2024-04-29 RX ADMIN — FAMOTIDINE 20 MG: 10 INJECTION, SOLUTION INTRAVENOUS at 10:58

## 2024-04-29 RX ADMIN — FENTANYL CITRATE 25 MCG: 50 INJECTION INTRAMUSCULAR; INTRAVENOUS at 12:05

## 2024-04-29 RX ADMIN — MIDAZOLAM 2 MG: 1 INJECTION INTRAMUSCULAR; INTRAVENOUS at 12:05

## 2024-04-29 RX ADMIN — PHENYLEPHRINE HYDROCHLORIDE 1 DROP: 25 SOLUTION/ DROPS OPHTHALMIC at 10:52

## 2024-04-29 RX ADMIN — SODIUM CHLORIDE: 9 INJECTION, SOLUTION INTRAVENOUS at 10:54

## 2024-04-29 RX ADMIN — ONDANSETRON 4 MG: 2 INJECTION INTRAMUSCULAR; INTRAVENOUS at 10:58

## 2024-04-29 RX ADMIN — TROPICAMIDE 1 DROP: 10 SOLUTION/ DROPS OPHTHALMIC at 10:52

## 2024-04-29 RX ADMIN — TETRACAINE HYDROCHLORIDE 1 DROP: 5 SOLUTION OPHTHALMIC at 10:51

## 2024-04-29 RX ADMIN — CYCLOPENTOLATE HYDROCHLORIDE 1 DROP: 10 SOLUTION/ DROPS OPHTHALMIC at 10:52

## 2024-04-29 RX ADMIN — FENTANYL CITRATE 25 MCG: 50 INJECTION INTRAMUSCULAR; INTRAVENOUS at 12:14

## 2024-04-29 RX ADMIN — TROPICAMIDE 1 DROP: 10 SOLUTION/ DROPS OPHTHALMIC at 10:55

## 2024-04-29 RX ADMIN — KETOROLAC TROMETHAMINE 1 DROP: 5 SOLUTION/ DROPS OPHTHALMIC at 10:51

## 2024-04-29 RX ADMIN — CYCLOPENTOLATE HYDROCHLORIDE 1 DROP: 10 SOLUTION/ DROPS OPHTHALMIC at 10:55

## 2024-04-29 ASSESSMENT — PAIN - FUNCTIONAL ASSESSMENT
PAIN_FUNCTIONAL_ASSESSMENT: 0-10

## 2024-04-29 NOTE — ANESTHESIA POSTPROCEDURE EVALUATION
Department of Anesthesiology  Postprocedure Note    Patient: Paty Win  MRN: [id number]  YOB: 1955  Date of evaluation: 4/29/2024    Procedure Summary       Date: 04/29/24 Room / Location: [address]    Anesthesia Start: 1202 Anesthesia Stop: 1223    Procedure: PHACOEMULSIFICATION WITH INTRAOCULAR LENS IMPLANT - LEFT EYE (Left: Eye) Diagnosis:       Nuclear sclerotic cataract of left eye      (Nuclear sclerotic cataract of left eye [H25.12])    Surgeons: Carlos Luna MD Responsible Provider: Mendez Beckham MD    Anesthesia Type: MAC ASA Status: 3            Anesthesia Type: MAC    Mere Phase I: Mere Score: 10    Mere Phase II: Mere Score: 10    Anesthesia Post Evaluation    Patient location during evaluation: PACU  Patient participation: complete - patient participated  Level of consciousness: awake  Airway patency: patent  Nausea & Vomiting: no nausea and no vomiting  Cardiovascular status: hemodynamically stable and blood pressure returned to baseline  Respiratory status: spontaneous ventilation, nonlabored ventilation and room air  Hydration status: stable  Comments: Uneventful MAC anesthetic. Ms. Win was seen comfortably conversing with staff following her procedure. Appropriate for discharge home with .   Pain management: adequate    No notable events documented.

## 2024-04-29 NOTE — OP NOTE
performed with confirmed free rotation of the nucleus. The lens nucleus was carefully removed using the phacoemulsification probe (CDE: 3.20).  Any remaining cortex was removed using the automated irrigation/aspiration unit. The posterior capsule was vacuum polished clean. The capsular bag was deepened using viscoelastic. The intraocular lens was loaded into the injection cartridge. The lens power and type were confirmed (MX60E 13.5D). The IOL was delivered into the capsular bag. The trailing haptic was dialed into position with a Sinskey hook. Residual viscoelastic was removed with the irrigation/aspiration unit. The eye was inflated with sterile BSS to a physiologic IOP. The corneal wounds were hydrated with sterile BSS, inspected and found to be water tight. The intraocular lens was confirmed to be well centered and the pupil round at the conclusion of the case. The lid speculum and drape were removed.  The patient had Ofloxacin. Prednisolone, and Alphagan solutions placed on the eye.  A sterile eye shield was placed over the eye. The patient tolerated the cataract surgery well without complication. The patient was taken to PACU in excellent condition. The patient will remove the shield at a designated time later in the day and begin using post-operative eyedrops as instructed. The patient is scheduled to return on the first post-operative day for follow up.      12:19 PM 04/29/24  Carlos Luna MD, FRANCHESKA

## 2024-04-29 NOTE — DISCHARGE INSTRUCTIONS
Carlos Luna MD, FRANCHESKA    POST OPERATIVE INSTRUCTIONS FOR CATARACT SURGERY    Left / Right   Eye       Starting the DAY of surgery use all drops as directed. Please locate the name of the individual medication you were prescribed below and follow the instructions for its use.      ANTIBIOTIC: (CAROLINA Cap): Your drop will be either Vigamox (moxifloxacin), Besivance or Polytrim.     Instill 4 drops a day for 1 week.        NSAID (GRAY Cap): Your drop will be BROMSITE, PROLENSA or KETOROLAC.    Instill 1 drop daily for 4 weeks.  Patients who receive KETOROLAC: Instill 1 drop 2 times a day for 2 weeks.         STEROID (PINK Cap): Your steroid drop will be PREDNISOLONE ACETATE, INVELTYS OR LOTEMAX SM.    Instill 4 drops a day for 1 week,   3 drops a day for 1 week,   2 drops a day for 1 week,  1 drop a day for 1 week.        Please bring ALL of your eye drops with you to surgery and all follow-up appointments.  Wait at least 3-5 minutes between eye drops. It's normal for some drops to briefly sting.        POST OPERATIVE CATARACT DROP SCHEDULE    Week 1 Day 1 Day 2 Day 3 Day 4 Day 5 Day 6 Day 7   VIGAMOX,  BESIVANCE or POLYTRIM  (Tan) ? ?  ? ? ? ?  ? ? ? ?  ? ? ? ?  ? ? ? ?  ? ? ? ?  ? ?   ? ?  ? ?     BROMSITE,  PROLENSA or KETOROLAC  (Gray) ? ? ? ? ? ? ?   PREDNISOLNE, INVELTYS or LOTEMAX SM  (Pink) ? ?  ? ? ? ?  ? ? ? ?  ? ? ? ?  ? ? ? ?  ? ? ? ?  ? ? ? ?  ? ?     Week 2 Day 8 Day 9 Day 10 Day 11 Day 12 Day 13 Day 14   BROMSITE, PROLENSA or  KETOROLAC  (Gray) ? ? ? ? ? ? ?   PREDNISOLNE, INVELTYS or LOTEMAX SM   (Pink) ? ? ? ? ? ? ? ? ? ? ? ? ? ? ? ? ? ? ? ? ?     Week 3 Day 15 Day 16 Day 17 Day 18 Day 19 Day 20 Day 21   BROMSITE, PROLENSA or  (York) ? ? ? ? ? ? ?   PREDNISOLNE, INVELTYS or LOTEMAX SM   (Pink) ? ? ? ? ? ? ? ? ? ? ? ? ? ?     Week 4 Day 22 Day 23 Day 24 Day 25 Day 26 Day 2 Day 28   BROMSITE, PROLENSA or  (York) ? ? ? ? ? ? ?   PREDNISOLNE, INVELTYS or LOTEMAX SM

## 2024-05-01 PROBLEM — Z01.818 PREOP EXAM FOR INTERNAL MEDICINE: Status: RESOLVED | Noted: 2024-04-01 | Resolved: 2024-05-01

## 2024-05-29 ENCOUNTER — OFFICE VISIT (OUTPATIENT)
Dept: INTERNAL MEDICINE CLINIC | Age: 69
End: 2024-05-29

## 2024-05-29 VITALS
OXYGEN SATURATION: 97 % | SYSTOLIC BLOOD PRESSURE: 140 MMHG | WEIGHT: 189.4 LBS | HEART RATE: 83 BPM | DIASTOLIC BLOOD PRESSURE: 70 MMHG | BODY MASS INDEX: 33.55 KG/M2

## 2024-05-29 DIAGNOSIS — K31.84 GASTROPARESIS: ICD-10-CM

## 2024-05-29 DIAGNOSIS — K76.0 HEPATIC STEATOSIS: ICD-10-CM

## 2024-05-29 DIAGNOSIS — I10 PRIMARY HYPERTENSION: Chronic | ICD-10-CM

## 2024-05-29 DIAGNOSIS — Z01.818 PREOP EXAM FOR INTERNAL MEDICINE: Primary | ICD-10-CM

## 2024-05-29 RX ORDER — METOCLOPRAMIDE 5 MG/1
5 TABLET ORAL
COMMUNITY
Start: 2024-04-25

## 2024-05-29 ASSESSMENT — ENCOUNTER SYMPTOMS
VOMITING: 1
CONSTIPATION: 0
CHEST TIGHTNESS: 0
COLOR CHANGE: 0
SHORTNESS OF BREATH: 0
WHEEZING: 0
NAUSEA: 1
ABDOMINAL PAIN: 0
COUGH: 0
DIARRHEA: 1
SORE THROAT: 0
BACK PAIN: 0

## 2024-05-29 NOTE — ASSESSMENT & PLAN NOTE
blood pressure initial reading borderline elevated, recheck improved, advised patient to split her amlodipine and losartan instead of taking both at the same time for better 24-hour control.  Continue attempts to adhere to low-salt diet and increase level of physical activity, will continue current medications and reevaluate as scheduled in 3 months when she will be due for her yearly wellness

## 2024-05-29 NOTE — ASSESSMENT & PLAN NOTE
does not feel metoclopramide is helping with the symptoms and feels worsening shakes.  She is scheduled to follow-up with GI in the near future, would probably recommend discontinuing since experiencing tremors and no relief in symptoms however recommended following up with GI.  She continues to have unintentional weight loss which may in part be secondary to the chronic vomiting and diarrhea she has been experiencing.

## 2024-05-29 NOTE — PROGRESS NOTES
Pulmonary effort is normal. No respiratory distress.      Breath sounds: No wheezing.   Abdominal:      Palpations: Abdomen is soft.      Tenderness: There is no abdominal tenderness.   Musculoskeletal:         General: Normal range of motion.      Cervical back: Neck supple.      Right lower leg: No edema.      Left lower leg: No edema.   Skin:     General: Skin is warm and dry.   Neurological:      General: No focal deficit present.      Mental Status: She is alert.      Cranial Nerves: No cranial nerve deficit.      Motor: No weakness.      Gait: Gait normal.   Psychiatric:         Mood and Affect: Mood normal.           Electronically signed by Bert Fernandez MD on 5/29/2024 at 10:50 AM.    This dictation was generated by voice recognition computer software.  Although all attempts are made to edit the dictation for accuracy, there may be errors in the transcription that are not intended.

## 2024-05-29 NOTE — ASSESSMENT & PLAN NOTE
continue care and recommendation as per GI, most recent lab results reviewed, liver function mostly back to normal except for still mild elevation in alkaline phosphatase however it has been trending downwards

## 2024-05-29 NOTE — ASSESSMENT & PLAN NOTE
history and physical form completed, patient is scheduled for bilateral peripheral plasty, brow plasty and excision of xanthelasma on Kaitlin 10 by Dr. Orr.  Patient is medically clear to proceed with the procedure given low risk procedure and topical/MAC anesthesia

## 2024-06-03 ENCOUNTER — OFFICE VISIT (OUTPATIENT)
Dept: PULMONOLOGY | Age: 69
End: 2024-06-03
Payer: MEDICARE

## 2024-06-03 VITALS
OXYGEN SATURATION: 98 % | WEIGHT: 184 LBS | HEART RATE: 75 BPM | DIASTOLIC BLOOD PRESSURE: 71 MMHG | BODY MASS INDEX: 32.59 KG/M2 | SYSTOLIC BLOOD PRESSURE: 145 MMHG

## 2024-06-03 DIAGNOSIS — E66.09 CLASS 1 OBESITY DUE TO EXCESS CALORIES WITH BODY MASS INDEX (BMI) OF 32.0 TO 32.9 IN ADULT, UNSPECIFIED WHETHER SERIOUS COMORBIDITY PRESENT: ICD-10-CM

## 2024-06-03 DIAGNOSIS — I10 PRIMARY HYPERTENSION: ICD-10-CM

## 2024-06-03 DIAGNOSIS — G47.33 OSA (OBSTRUCTIVE SLEEP APNEA): Primary | ICD-10-CM

## 2024-06-03 PROBLEM — E66.811 CLASS 1 OBESITY DUE TO EXCESS CALORIES WITH BODY MASS INDEX (BMI) OF 32.0 TO 32.9 IN ADULT: Status: ACTIVE | Noted: 2024-06-03

## 2024-06-03 PROCEDURE — G8427 DOCREV CUR MEDS BY ELIG CLIN: HCPCS | Performed by: NURSE PRACTITIONER

## 2024-06-03 PROCEDURE — 3078F DIAST BP <80 MM HG: CPT | Performed by: NURSE PRACTITIONER

## 2024-06-03 PROCEDURE — 3017F COLORECTAL CA SCREEN DOC REV: CPT | Performed by: NURSE PRACTITIONER

## 2024-06-03 PROCEDURE — 1036F TOBACCO NON-USER: CPT | Performed by: NURSE PRACTITIONER

## 2024-06-03 PROCEDURE — G8417 CALC BMI ABV UP PARAM F/U: HCPCS | Performed by: NURSE PRACTITIONER

## 2024-06-03 PROCEDURE — 3077F SYST BP >= 140 MM HG: CPT | Performed by: NURSE PRACTITIONER

## 2024-06-03 PROCEDURE — 1123F ACP DISCUSS/DSCN MKR DOCD: CPT | Performed by: NURSE PRACTITIONER

## 2024-06-03 PROCEDURE — 1090F PRES/ABSN URINE INCON ASSESS: CPT | Performed by: NURSE PRACTITIONER

## 2024-06-03 PROCEDURE — 99214 OFFICE O/P EST MOD 30 MIN: CPT | Performed by: NURSE PRACTITIONER

## 2024-06-03 PROCEDURE — G8399 PT W/DXA RESULTS DOCUMENT: HCPCS | Performed by: NURSE PRACTITIONER

## 2024-06-03 ASSESSMENT — SLEEP AND FATIGUE QUESTIONNAIRES
HOW LIKELY ARE YOU TO NOD OFF OR FALL ASLEEP WHILE WATCHING TV: MODERATE CHANCE OF DOZING
HOW LIKELY ARE YOU TO NOD OFF OR FALL ASLEEP WHILE SITTING AND TALKING TO SOMEONE: WOULD NEVER DOZE
ESS TOTAL SCORE: 5
HOW LIKELY ARE YOU TO NOD OFF OR FALL ASLEEP WHILE SITTING AND READING: SLIGHT CHANCE OF DOZING
HOW LIKELY ARE YOU TO NOD OFF OR FALL ASLEEP WHILE SITTING INACTIVE IN A PUBLIC PLACE: WOULD NEVER DOZE
HOW LIKELY ARE YOU TO NOD OFF OR FALL ASLEEP WHILE LYING DOWN TO REST IN THE AFTERNOON WHEN CIRCUMSTANCES PERMIT: SLIGHT CHANCE OF DOZING
HOW LIKELY ARE YOU TO NOD OFF OR FALL ASLEEP WHEN YOU ARE A PASSENGER IN A CAR FOR AN HOUR WITHOUT A BREAK: SLIGHT CHANCE OF DOZING
HOW LIKELY ARE YOU TO NOD OFF OR FALL ASLEEP WHILE SITTING QUIETLY AFTER LUNCH WITHOUT ALCOHOL: WOULD NEVER DOZE
HOW LIKELY ARE YOU TO NOD OFF OR FALL ASLEEP IN A CAR, WHILE STOPPED FOR A FEW MINUTES IN TRAFFIC: WOULD NEVER DOZE

## 2024-06-03 NOTE — PROGRESS NOTES
Nabeel Traylor CNP  Magdalene Loco CNP [facility]  [address]  [address]  [place] [address]  P- [phone]  F- [phone]     [facility]  [facility]  [address]  [address]  [facility] [address]  Dept: [phone]  Dept Fax: [phone]  Loc: [phone]      Assessment/Plan:      1. JHON (obstructive sleep apnea)  Assessment & Plan:  Chronic - Stable: Reviewed and analyzed results of physiologic download from patient's machine and reviewed with patients. Supplies and parts as needed for the machine. These are medically necessary. Limit caffeine use after 3 PM. Based on the analyzed data, we will continue with current settings. Continues to do well with her machine. She is compliant and getting good symptom control. Will send in a prescription for a chin strap so she can use it with the nasal mask. Instructed her to use the nasal spray consistently for nasal congestion and also instructed her to adjust humidity settings on the machine for congestion/comfort. She will return in 6 mo for follow up. Instructed her to return sooner or contact the office if experiences new or worsening of symptoms. Encouraged her to continue to use her machine each night. Encouraged the patient to contact the office with any questions or concerns.  2. Primary hypertension  Assessment & Plan:   Chronic- Stable.  Discussed the importance of treating sleep apnea as part of the management of this disorder.  Cont any meds per PCP and other physicians.    3. Class 1 obesity due to excess calories with body mass index (BMI) of 32.0 to 32.9 in adult, unspecified whether serious comorbidity present  Assessment & Plan:  Chronic-not stable:  Discussed importance of treating obstructive sleep apnea and getting sufficient sleep to assist with weight control.  Encouraged her to work on weight loss through diet and exercise. Recommended DASH or

## 2024-06-03 NOTE — ASSESSMENT & PLAN NOTE
Chronic-not stable:  Discussed importance of treating obstructive sleep apnea and getting sufficient sleep to assist with weight control.  Encouraged her to work on weight loss through diet and exercise. Recommended DASH or Mediterranean diets.

## 2024-06-03 NOTE — ASSESSMENT & PLAN NOTE
Chronic - Stable: Reviewed and analyzed results of physiologic download from patient's machine and reviewed with patients. Supplies and parts as needed for the machine. These are medically necessary. Limit caffeine use after 3 PM. Based on the analyzed data, we will continue with current settings. Continues to do well with her machine. She is compliant and getting good symptom control. Will send in a prescription for a chin strap so she can use it with the nasal mask. Instructed her to use the nasal spray consistently for nasal congestion and also instructed her to adjust humidity settings on the machine for congestion/comfort. She will return in 6 mo for follow up. Instructed her to return sooner or contact the office if experiences new or worsening of symptoms. Encouraged her to continue to use her machine each night. Encouraged the patient to contact the office with any questions or concerns.

## 2024-06-03 NOTE — PROGRESS NOTES
Diagnosis: [x] JHON (G47.33) [] CSA (G47.31) [] Apnea (G47.30)   Length of Need: [x] 18 Months [] 99 Months [] Other:   Machine (HANNAH!): [] Respironics Dream Station   2   Auto [] ResMed AirSense     Auto 11 [] Other:     []  CPAP () [] Bilevel ()   Mode: [] Auto [] Spontaneous    Mode: [] Auto [] Spontaneous             Comfort Settings:      Humidifier: [] Heated ()        [x] Water chamber replacement ()/ 1 per 6 months        Mask:   [x] Nasal () /1 per 3 months [] Full Face () /1 per 3 months   [x] Patient choice -Size and fit mask [] Patient Choice - Size and fit mask   [] Dispense: [] Dispense:   [x] Headgear () / 1 per 3 months [] Headgear () / 1 per 3 months   [x] Replacement Nasal Cushion ()/2 per month [] Interface Replacement ()/1 per month   [] Replacement Nasal Pillows ()/2 per month         Tubing: [x] Heated ()/1 per 3 months    [] Standard ()/1 per 3 months [] Other:           Filters: [x] Non-disposable ()/1 per 6 months     [x] Ultra-Fine, Disposable ()/2 per month        Miscellaneous: [x] Chin Strap ()/ 1 per 6 months [] O2 bleed-in:        LPM   [] Oxymetry on CPAP/Bilevel [x]  Other: Modem: ()         Start Order Date: 24    MEDICAL JUSTIFICATION:  I, the undersigned, certify that the above prescribed supplies are medically necessary for this patient’s wellbeing.  In my opinion, the supplies are both reasonable and necessary in reference to accepted standards of medicalpractice in treatment of this patient’s condition.    Magdalene Loco CNP    NPI: [id number]     Order Signed Date: 24    Paty Win  1955  [address] [facility] [address] [facility] [id number] [phone] (home)   [phone] (mobile)      Insurance Info (confirm with patient if correct):  Payer/Plan Subscr  Sex Relation Sub. Ins. ID Effective Group Num

## 2024-06-27 ENCOUNTER — OFFICE VISIT (OUTPATIENT)
Dept: INTERNAL MEDICINE CLINIC | Age: 69
End: 2024-06-27

## 2024-06-27 VITALS
OXYGEN SATURATION: 97 % | BODY MASS INDEX: 32.56 KG/M2 | DIASTOLIC BLOOD PRESSURE: 70 MMHG | HEART RATE: 99 BPM | SYSTOLIC BLOOD PRESSURE: 136 MMHG | WEIGHT: 183.8 LBS

## 2024-06-27 DIAGNOSIS — K31.84 GASTROPARESIS: ICD-10-CM

## 2024-06-27 DIAGNOSIS — Z01.818 PREOP EXAM FOR INTERNAL MEDICINE: ICD-10-CM

## 2024-06-27 DIAGNOSIS — Z01.818 PREOP EXAM FOR INTERNAL MEDICINE: Primary | ICD-10-CM

## 2024-06-27 DIAGNOSIS — I10 PRIMARY HYPERTENSION: Chronic | ICD-10-CM

## 2024-06-27 DIAGNOSIS — K52.9 CHRONIC DIARRHEA: ICD-10-CM

## 2024-06-27 PROBLEM — H02.59 FLOPPY EYELID SYNDROME OF BOTH EYES: Status: ACTIVE | Noted: 2024-06-27

## 2024-06-27 LAB
ALBUMIN SERPL-MCNC: 4.4 G/DL (ref 3.4–5)
ALBUMIN/GLOB SERPL: 1.5 {RATIO} (ref 1.1–2.2)
ALP SERPL-CCNC: 133 U/L (ref 40–129)
ALT SERPL-CCNC: 14 U/L (ref 10–40)
ANION GAP SERPL CALCULATED.3IONS-SCNC: 17 MMOL/L (ref 3–16)
AST SERPL-CCNC: 13 U/L (ref 15–37)
BILIRUB SERPL-MCNC: 0.5 MG/DL (ref 0–1)
BUN SERPL-MCNC: 15 MG/DL (ref 7–20)
CALCIUM SERPL-MCNC: 10.2 MG/DL (ref 8.3–10.6)
CHLORIDE SERPL-SCNC: 105 MMOL/L (ref 99–110)
CO2 SERPL-SCNC: 22 MMOL/L (ref 21–32)
CREAT SERPL-MCNC: 1 MG/DL (ref 0.6–1.2)
DEPRECATED RDW RBC AUTO: 13.7 % (ref 12.4–15.4)
GFR SERPLBLD CREATININE-BSD FMLA CKD-EPI: 60 ML/MIN/{1.73_M2}
GLUCOSE SERPL-MCNC: 119 MG/DL (ref 70–99)
HCT VFR BLD AUTO: 43.3 % (ref 36–48)
HGB BLD-MCNC: 14.4 G/DL (ref 12–16)
MCH RBC QN AUTO: 29 PG (ref 26–34)
MCHC RBC AUTO-ENTMCNC: 33.3 G/DL (ref 31–36)
MCV RBC AUTO: 86.9 FL (ref 80–100)
PLATELET # BLD AUTO: 282 K/UL (ref 135–450)
PMV BLD AUTO: 9.2 FL (ref 5–10.5)
POTASSIUM SERPL-SCNC: 3.6 MMOL/L (ref 3.5–5.1)
PROT SERPL-MCNC: 7.4 G/DL (ref 6.4–8.2)
RBC # BLD AUTO: 4.98 M/UL (ref 4–5.2)
SODIUM SERPL-SCNC: 144 MMOL/L (ref 136–145)
WBC # BLD AUTO: 14.7 K/UL (ref 4–11)

## 2024-06-27 ASSESSMENT — ENCOUNTER SYMPTOMS
COLOR CHANGE: 0
CONSTIPATION: 0
CHEST TIGHTNESS: 0
NAUSEA: 1
BACK PAIN: 0
ANAL BLEEDING: 0
COUGH: 0
WHEEZING: 0
SORE THROAT: 0
SHORTNESS OF BREATH: 0
ABDOMINAL PAIN: 0
DIARRHEA: 1
VOMITING: 1

## 2024-06-27 NOTE — PROGRESS NOTES
ASSESSMENT/PLAN:  1. Preop exam for internal medicine  Assessment & Plan:    patient is scheduled for blepharoplasty July 9 under general anesthesia for droopy eyelids interfering with vision .physical exam this visit completed within stable vital signs and grossly normal exam.  Patient had general anesthesia in the past with good tolerance and denies any past history of anesthesia complications, no family history of malignant hyperthermia.  Currently not on any anticoagulation therapy or blood thinners.  Patient is medically cleared to proceed with the surgery as scheduled  Orders:  -     CBC; Future  -     Comprehensive Metabolic Panel; Future  2. Primary hypertension  Assessment & Plan:    blood pressure stable and well-controlled on current combination of amlodipine along with losartan, continue same  Orders:  -     CBC; Future  -     Comprehensive Metabolic Panel; Future  3. Chronic diarrhea  Assessment & Plan:    patient with chronic diarrhea leading to hypokalemia .patient not taking potassium supplement due to having trouble swallowing the pills, she does not want the liquid form because she is very sensitive to taste specially with her chronic nausea and vomiting.  Advised will check labs specially if going to have surgery in the near future and if potassium level still low will switch to the immediate release form which she can crush and mix with applesauce.  Advised to start eating bananas as well.  Patient verbalized understanding, encouraged to continue taking fiber supplement as psyllium husk to help as bulk forming agent  4. Gastroparesis  Assessment & Plan:    patient is scheduled for myomectomy July 9 under general anesthesia as noted above after she was unable to tolerate side effects of metoclopramide, Zofran was not effective, continued to have progressive weight loss and consistent nausea and vomiting.  She will follow-up with GI      Return for as scheduled.     SUBJECTIVE  HPI:   Patient here

## 2024-06-27 NOTE — ASSESSMENT & PLAN NOTE
patient with chronic diarrhea leading to hypokalemia .patient not taking potassium supplement due to having trouble swallowing the pills, she does not want the liquid form because she is very sensitive to taste specially with her chronic nausea and vomiting.  Advised will check labs specially if going to have surgery in the near future and if potassium level still low will switch to the immediate release form which she can crush and mix with applesauce.  Advised to start eating bananas as well.  Patient verbalized understanding, encouraged to continue taking fiber supplement as psyllium husk to help as bulk forming agent

## 2024-06-27 NOTE — ASSESSMENT & PLAN NOTE
blood pressure stable and well-controlled on current combination of amlodipine along with losartan, continue same

## 2024-06-27 NOTE — ASSESSMENT & PLAN NOTE
patient is scheduled for myomectomy July 9 under general anesthesia as noted above after she was unable to tolerate side effects of metoclopramide, Zofran was not effective, continued to have progressive weight loss and consistent nausea and vomiting.  She will follow-up with GI

## 2024-06-27 NOTE — ASSESSMENT & PLAN NOTE
patient is scheduled for blepharoplasty July 9 under general anesthesia for droopy eyelids interfering with vision .physical exam this visit completed within stable vital signs and grossly normal exam.  Patient had general anesthesia in the past with good tolerance and denies any past history of anesthesia complications, no family history of malignant hyperthermia.  Currently not on any anticoagulation therapy or blood thinners.  Patient is medically cleared to proceed with the surgery as scheduled

## 2024-06-28 PROBLEM — Z01.818 PREOP EXAM FOR INTERNAL MEDICINE: Status: RESOLVED | Noted: 2024-05-29 | Resolved: 2024-06-28

## 2024-07-09 ENCOUNTER — TELEPHONE (OUTPATIENT)
Dept: INTERNAL MEDICINE CLINIC | Age: 69
End: 2024-07-09

## 2024-07-09 NOTE — TELEPHONE ENCOUNTER
[facility] callng to let Dr Fernandez know pt out of surgery but going in and out of afib pulse average 120--does  want to see in office or have her go on to ER to be checked out--per Dr Fernandez go on to Er to be checked out---[facility] will fax over her EKG---thanks.

## 2024-07-19 ENCOUNTER — TELEPHONE (OUTPATIENT)
Dept: PULMONOLOGY | Age: 69
End: 2024-07-19

## 2024-09-26 DIAGNOSIS — E78.2 MIXED HYPERLIPIDEMIA: Chronic | ICD-10-CM

## 2024-09-26 DIAGNOSIS — I10 PRIMARY HYPERTENSION: Chronic | ICD-10-CM

## 2024-09-27 RX ORDER — AMLODIPINE BESYLATE 10 MG/1
10 TABLET ORAL DAILY
Qty: 90 TABLET | Refills: 1 | Status: SHIPPED | OUTPATIENT
Start: 2024-09-27

## 2024-09-27 RX ORDER — LOSARTAN POTASSIUM 50 MG/1
50 TABLET ORAL DAILY
Qty: 90 TABLET | Refills: 1 | Status: SHIPPED | OUTPATIENT
Start: 2024-09-27

## 2024-09-27 RX ORDER — PRAVASTATIN SODIUM 20 MG
20 TABLET ORAL EVERY EVENING
Qty: 90 TABLET | Refills: 1 | Status: SHIPPED | OUTPATIENT
Start: 2024-09-27

## 2024-10-02 ENCOUNTER — OFFICE VISIT (OUTPATIENT)
Dept: INTERNAL MEDICINE CLINIC | Age: 69
End: 2024-10-02

## 2024-10-02 VITALS
WEIGHT: 182.2 LBS | HEART RATE: 90 BPM | BODY MASS INDEX: 32.28 KG/M2 | SYSTOLIC BLOOD PRESSURE: 116 MMHG | DIASTOLIC BLOOD PRESSURE: 78 MMHG | OXYGEN SATURATION: 99 %

## 2024-10-02 DIAGNOSIS — E78.2 MIXED HYPERLIPIDEMIA: Chronic | ICD-10-CM

## 2024-10-02 DIAGNOSIS — K31.84 GASTROPARESIS: ICD-10-CM

## 2024-10-02 DIAGNOSIS — Z23 NEEDS FLU SHOT: ICD-10-CM

## 2024-10-02 DIAGNOSIS — I10 PRIMARY HYPERTENSION: Chronic | ICD-10-CM

## 2024-10-02 DIAGNOSIS — I48.91 NEW ONSET ATRIAL FIBRILLATION (HCC): ICD-10-CM

## 2024-10-02 DIAGNOSIS — Z00.00 MEDICARE ANNUAL WELLNESS VISIT, SUBSEQUENT: Primary | ICD-10-CM

## 2024-10-02 PROBLEM — H02.59 FLOPPY EYELID SYNDROME OF BOTH EYES: Status: RESOLVED | Noted: 2024-06-27 | Resolved: 2024-10-02

## 2024-10-02 RX ORDER — METOPROLOL SUCCINATE 25 MG/1
50 TABLET, EXTENDED RELEASE ORAL DAILY
COMMUNITY
Start: 2024-08-01

## 2024-10-02 RX ORDER — LOSARTAN POTASSIUM 50 MG/1
50 TABLET ORAL DAILY
COMMUNITY

## 2024-10-02 SDOH — HEALTH STABILITY: PHYSICAL HEALTH: ON AVERAGE, HOW MANY DAYS PER WEEK DO YOU ENGAGE IN MODERATE TO STRENUOUS EXERCISE (LIKE A BRISK WALK)?: 0 DAYS

## 2024-10-02 SDOH — HEALTH STABILITY: PHYSICAL HEALTH: ON AVERAGE, HOW MANY MINUTES DO YOU ENGAGE IN EXERCISE AT THIS LEVEL?: 0 MIN

## 2024-10-02 ASSESSMENT — PATIENT HEALTH QUESTIONNAIRE - PHQ9
1. LITTLE INTEREST OR PLEASURE IN DOING THINGS: NOT AT ALL
2. FEELING DOWN, DEPRESSED OR HOPELESS: NOT AT ALL
SUM OF ALL RESPONSES TO PHQ QUESTIONS 1-9: 0
SUM OF ALL RESPONSES TO PHQ9 QUESTIONS 1 & 2: 0

## 2024-10-02 ASSESSMENT — ENCOUNTER SYMPTOMS
BACK PAIN: 0
SORE THROAT: 0
ABDOMINAL PAIN: 0
COUGH: 0
VOMITING: 0
SHORTNESS OF BREATH: 0
NAUSEA: 0
DIARRHEA: 1
CHEST TIGHTNESS: 0
CONSTIPATION: 0
COLOR CHANGE: 0
WHEEZING: 0

## 2024-10-02 ASSESSMENT — LIFESTYLE VARIABLES
HOW OFTEN DO YOU HAVE A DRINK CONTAINING ALCOHOL: NEVER
HOW OFTEN DO YOU HAVE A DRINK CONTAINING ALCOHOL: 1
HOW MANY STANDARD DRINKS CONTAINING ALCOHOL DO YOU HAVE ON A TYPICAL DAY: PATIENT DOES NOT DRINK
HOW MANY STANDARD DRINKS CONTAINING ALCOHOL DO YOU HAVE ON A TYPICAL DAY: 0
HOW OFTEN DO YOU HAVE SIX OR MORE DRINKS ON ONE OCCASION: 1

## 2024-10-02 NOTE — ASSESSMENT & PLAN NOTE
Patient was evaluated by cardiology, was started on Eliquis however she did not not start that up till recently due to to having to wait to get it from [facility] secondary to [place] in the [place].  She continues to have dizzy spells and underwent 1 month cardiac monitor, results of interpretation still pending, she is scheduled to follow-up with cardiology at [facility] this month .so far tolerating Eliquis without any major side effects, precautions of anticoagulation therapy discussed with patient.   She is in normal sinus rhythm on today's visit, will follow along with cardiology

## 2024-10-02 NOTE — ASSESSMENT & PLAN NOTE
Blood pressure stable and well-controlled on current medication regimen, patient was taken off amlodipine following development of an allergic reaction to Cardizem in the emergency room she is currently on metoprolol that was initiated by cardiology following diagnosis of A-fib and has been doing okay with current dose and remains on her regular dose of losartan.  Continue same and follow-up with cardiology as scheduled this month

## 2024-10-02 NOTE — PROGRESS NOTES
Medicare Annual Wellness Visit  Name: Paty Win Today’s Date: 10/2/2024   MRN: [id number] Sex: Female   Age: 69 y.o. Ethnicity: Non- / Non    : 1955 Race: White (non-)      Paty Win is here for Medicare AWV     Screenings for behavioral, psychosocial and functional/safety risks, and cognitive dysfunction are all negative except as indicated below. These results, as well as other patient data from the Health Risk Assessment form, are documented in Flowsheets linked to this Encounter.    Allergies   Allergen Reactions    Diltiazem Hives     During infusion in ED    Penicillins Hives and Swelling    Rofecoxib Hives and Swelling    Sulfa Antibiotics Hives and Swelling    Tegaderm Ag Mesh [Silver]      Had blisters under tegaderm    Bactrim [Sulfamethoxazole-Trimethoprim] Rash       Prior to Visit Medications    Medication Sig Taking? Authorizing Provider   losartan (COZAAR) 50 MG tablet Take 1 tablet by mouth daily Yes ProviderLacie MD   apixaban (ELIQUIS) 5 MG TABS tablet Take 0.5 tablets by mouth 2 times daily Yes Provider, MD Lacie   metoprolol succinate (TOPROL XL) 25 MG extended release tablet Take 2 tablets by mouth daily Yes Provider, MD Lacie   pravastatin (PRAVACHOL) 20 MG tablet TAKE 1 TABLET BY MOUTH EVERY EVENING Yes Bert Fernandez MD   Multiple Vitamins-Minerals (THERAPEUTIC MULTIVITAMIN-MINERALS) tablet Take 1 tablet by mouth daily Yes ProviderLacie MD   omeprazole 20 MG EC tablet Take 1 tablet by mouth at bedtime Yes Provider, MD Lacie   potassium chloride (KLOR-CON M) 20 MEQ extended release tablet Take 1 tablet by mouth daily  Patient not taking: Reported on 2024  Bert Fernandez MD   clotrimazole-betamethasone (LOTRISONE) 1-0.05 % cream Apply topically 2 times daily.  Bert Fernandez MD   Handicap Placard MISC by Does not apply route Valid 2022 till 2027  Bert Fernandez MD   loratadine (CLARITIN) 10 MG

## 2024-10-02 NOTE — ASSESSMENT & PLAN NOTE
Remains on pravastatin, could not tolerate higher potency statins due to myalgia, will continue same, update labs next visit, reinforced recommendations for diet and lifestyle modification changes

## 2024-10-07 DIAGNOSIS — I10 PRIMARY HYPERTENSION: Chronic | ICD-10-CM

## 2024-10-07 RX ORDER — LOSARTAN POTASSIUM 50 MG/1
50 TABLET ORAL DAILY
Qty: 90 TABLET | Refills: 1 | Status: SHIPPED | OUTPATIENT
Start: 2024-10-07

## 2024-10-07 RX ORDER — AMLODIPINE BESYLATE 10 MG/1
10 TABLET ORAL DAILY
Qty: 90 TABLET | Refills: 1 | Status: CANCELLED | OUTPATIENT
Start: 2024-10-07

## 2024-10-07 RX ORDER — LOSARTAN POTASSIUM 50 MG/1
50 TABLET ORAL DAILY
Qty: 90 TABLET | OUTPATIENT
Start: 2024-10-07

## 2024-10-11 ASSESSMENT — SLEEP AND FATIGUE QUESTIONNAIRES
HOW LIKELY ARE YOU TO NOD OFF OR FALL ASLEEP WHEN YOU ARE A PASSENGER IN A CAR FOR AN HOUR WITHOUT A BREAK: SLIGHT CHANCE OF DOZING
HOW LIKELY ARE YOU TO NOD OFF OR FALL ASLEEP WHILE SITTING AND TALKING TO SOMEONE: WOULD NEVER DOZE
HOW LIKELY ARE YOU TO NOD OFF OR FALL ASLEEP WHILE SITTING INACTIVE IN A PUBLIC PLACE: WOULD NEVER DOZE
HOW LIKELY ARE YOU TO NOD OFF OR FALL ASLEEP IN A CAR, WHILE STOPPED FOR A FEW MINUTES IN TRAFFIC: WOULD NEVER DOZE
ESS TOTAL SCORE: 6
HOW LIKELY ARE YOU TO NOD OFF OR FALL ASLEEP WHILE WATCHING TV: MODERATE CHANCE OF DOZING
HOW LIKELY ARE YOU TO NOD OFF OR FALL ASLEEP WHILE WATCHING TV: MODERATE CHANCE OF DOZING
HOW LIKELY ARE YOU TO NOD OFF OR FALL ASLEEP WHEN YOU ARE A PASSENGER IN A CAR FOR AN HOUR WITHOUT A BREAK: SLIGHT CHANCE OF DOZING
HOW LIKELY ARE YOU TO NOD OFF OR FALL ASLEEP WHILE SITTING QUIETLY AFTER LUNCH WITHOUT ALCOHOL: SLIGHT CHANCE OF DOZING
HOW LIKELY ARE YOU TO NOD OFF OR FALL ASLEEP IN A CAR, WHILE STOPPED FOR A FEW MINUTES IN TRAFFIC: WOULD NEVER DOZE
HOW LIKELY ARE YOU TO NOD OFF OR FALL ASLEEP WHILE SITTING AND TALKING TO SOMEONE: WOULD NEVER DOZE
HOW LIKELY ARE YOU TO NOD OFF OR FALL ASLEEP WHILE LYING DOWN TO REST IN THE AFTERNOON WHEN CIRCUMSTANCES PERMIT: SLIGHT CHANCE OF DOZING
HOW LIKELY ARE YOU TO NOD OFF OR FALL ASLEEP WHILE SITTING AND READING: SLIGHT CHANCE OF DOZING
HOW LIKELY ARE YOU TO NOD OFF OR FALL ASLEEP WHILE LYING DOWN TO REST IN THE AFTERNOON WHEN CIRCUMSTANCES PERMIT: SLIGHT CHANCE OF DOZING
HOW LIKELY ARE YOU TO NOD OFF OR FALL ASLEEP WHILE SITTING INACTIVE IN A PUBLIC PLACE: WOULD NEVER DOZE
HOW LIKELY ARE YOU TO NOD OFF OR FALL ASLEEP WHILE SITTING QUIETLY AFTER LUNCH WITHOUT ALCOHOL: SLIGHT CHANCE OF DOZING
HOW LIKELY ARE YOU TO NOD OFF OR FALL ASLEEP WHILE SITTING AND READING: SLIGHT CHANCE OF DOZING

## 2024-10-14 ENCOUNTER — OFFICE VISIT (OUTPATIENT)
Dept: PULMONOLOGY | Age: 69
End: 2024-10-14
Payer: MEDICARE

## 2024-10-14 VITALS
HEART RATE: 89 BPM | HEIGHT: 63 IN | WEIGHT: 182.8 LBS | OXYGEN SATURATION: 99 % | SYSTOLIC BLOOD PRESSURE: 128 MMHG | DIASTOLIC BLOOD PRESSURE: 84 MMHG | BODY MASS INDEX: 32.39 KG/M2

## 2024-10-14 DIAGNOSIS — E66.811 CLASS 1 OBESITY DUE TO EXCESS CALORIES WITH BODY MASS INDEX (BMI) OF 32.0 TO 32.9 IN ADULT, UNSPECIFIED WHETHER SERIOUS COMORBIDITY PRESENT: ICD-10-CM

## 2024-10-14 DIAGNOSIS — I10 PRIMARY HYPERTENSION: ICD-10-CM

## 2024-10-14 DIAGNOSIS — G47.33 OSA (OBSTRUCTIVE SLEEP APNEA): Primary | ICD-10-CM

## 2024-10-14 DIAGNOSIS — E66.09 CLASS 1 OBESITY DUE TO EXCESS CALORIES WITH BODY MASS INDEX (BMI) OF 32.0 TO 32.9 IN ADULT, UNSPECIFIED WHETHER SERIOUS COMORBIDITY PRESENT: ICD-10-CM

## 2024-10-14 PROCEDURE — 1036F TOBACCO NON-USER: CPT | Performed by: NURSE PRACTITIONER

## 2024-10-14 PROCEDURE — G8417 CALC BMI ABV UP PARAM F/U: HCPCS | Performed by: NURSE PRACTITIONER

## 2024-10-14 PROCEDURE — 3079F DIAST BP 80-89 MM HG: CPT | Performed by: NURSE PRACTITIONER

## 2024-10-14 PROCEDURE — G8482 FLU IMMUNIZE ORDER/ADMIN: HCPCS | Performed by: NURSE PRACTITIONER

## 2024-10-14 PROCEDURE — 99214 OFFICE O/P EST MOD 30 MIN: CPT | Performed by: NURSE PRACTITIONER

## 2024-10-14 PROCEDURE — G8399 PT W/DXA RESULTS DOCUMENT: HCPCS | Performed by: NURSE PRACTITIONER

## 2024-10-14 PROCEDURE — 1090F PRES/ABSN URINE INCON ASSESS: CPT | Performed by: NURSE PRACTITIONER

## 2024-10-14 PROCEDURE — G8427 DOCREV CUR MEDS BY ELIG CLIN: HCPCS | Performed by: NURSE PRACTITIONER

## 2024-10-14 PROCEDURE — 1123F ACP DISCUSS/DSCN MKR DOCD: CPT | Performed by: NURSE PRACTITIONER

## 2024-10-14 PROCEDURE — G2211 COMPLEX E/M VISIT ADD ON: HCPCS | Performed by: NURSE PRACTITIONER

## 2024-10-14 PROCEDURE — 3017F COLORECTAL CA SCREEN DOC REV: CPT | Performed by: NURSE PRACTITIONER

## 2024-10-14 PROCEDURE — 3074F SYST BP LT 130 MM HG: CPT | Performed by: NURSE PRACTITIONER

## 2024-10-14 NOTE — PROGRESS NOTES
Nabeel Loco [facility]  [address]  [address]  [place] [address]  F- [phone]     [facility]  [facility] SLEEP MEDICINE  [address]  [address]  [facility] [address]  Dept: [phone]  Dept Fax: [phone]  Loc: [phone]       Assessment & Plan  JHON (obstructive sleep apnea)  Chronic - Question if fully stable: Reviewed and analyzed results of physiologic download from patient's machine and reviewed with patients. Supplies and parts as needed for the machine. These are medically necessary. Limit caffeine use after 3 PM. Based on the analyzed data, we will continue with current settings. At this time she is willing to restart using the machine. Discussed other alternative treatment for sleep apnea, including the dental appliance and the hypoglossal nerve stimulation therapy. Based the severity of her sleep apnea (RDI 39.4/hr), the dental appliance may not be the best option for her sleep apnea treatment. She does not meet criteria for the hypoglossal nerve stimulation as her AHI is in the mild sleep apnea category (based on AHI per FDA guideline). At this time she is willing to resume the PAP therapy for her sleep apnea treatment given recent AF episode. She can consider trial of different styles of masks/headgear for comfort once she meets compliance so the insurance pays for supplies. Once we have data, we will review them and consider pressure adjustment as appropriate. She will return in 2 mo for follow up. Instructed her to contact the office if experiences new or worsening of symptoms. Encouraged her to use her machine each night, all night. He will return in 6 mo for follow up.     Discussed the importance of consistence use of the machine and encouraged consistent use of the machine each night. Also discussed the importance of treating Obstructive Sleep Apnea from a physiological standpoint. Instructed not

## 2024-10-14 NOTE — ASSESSMENT & PLAN NOTE
Chronic - Question if fully stable: Reviewed and analyzed results of physiologic download from patient's machine and reviewed with patients. Supplies and parts as needed for the machine. These are medically necessary. Limit caffeine use after 3 PM. Based on the analyzed data, we will continue with current settings. At this time she is willing to restart using the machine. Discussed other alternative treatment for sleep apnea, including the dental appliance and the hypoglossal nerve stimulation therapy. Based the severity of her sleep apnea (RDI 39.4/hr), the dental appliance may not be the best option for her sleep apnea treatment. She does not meet criteria for the hypoglossal nerve stimulation as her AHI is in the mild sleep apnea category (based on AHI per FDA guideline). At this time she is willing to resume the PAP therapy for her sleep apnea treatment given recent AF episode. She can consider trial of different styles of masks/headgear for comfort once she meets compliance so the insurance pays for supplies. Once we have data, we will review them and consider pressure adjustment as appropriate. She will return in 2 mo for follow up. Instructed her to contact the office if experiences new or worsening of symptoms. Encouraged her to use her machine each night, all night. He will return in 6 mo for follow up.     Discussed the importance of consistence use of the machine and encouraged consistent use of the machine each night. Also discussed the importance of treating Obstructive Sleep Apnea from a physiological standpoint. Instructed not to drive unless had 4 hours of effective therapy for JHON the night before. Did review the risks of under or untreated JHON including, but not limited to, higher risks of motor vehicle accidents, stroke, heart attacks, and death. Patients verbalized understanding and accepts all these risks.

## 2024-11-01 PROBLEM — Z00.00 MEDICARE ANNUAL WELLNESS VISIT, SUBSEQUENT: Status: RESOLVED | Noted: 2023-09-11 | Resolved: 2024-11-01

## 2025-02-09 ASSESSMENT — SLEEP AND FATIGUE QUESTIONNAIRES
HOW LIKELY ARE YOU TO NOD OFF OR FALL ASLEEP WHEN YOU ARE A PASSENGER IN A CAR FOR AN HOUR WITHOUT A BREAK: SLIGHT CHANCE OF DOZING
HOW LIKELY ARE YOU TO NOD OFF OR FALL ASLEEP WHILE WATCHING TV: SLIGHT CHANCE OF DOZING
HOW LIKELY ARE YOU TO NOD OFF OR FALL ASLEEP WHILE SITTING INACTIVE IN A PUBLIC PLACE: WOULD NEVER DOZE
HOW LIKELY ARE YOU TO NOD OFF OR FALL ASLEEP IN A CAR, WHILE STOPPED FOR A FEW MINUTES IN TRAFFIC: WOULD NEVER DOZE
HOW LIKELY ARE YOU TO NOD OFF OR FALL ASLEEP WHILE WATCHING TV: SLIGHT CHANCE OF DOZING
HOW LIKELY ARE YOU TO NOD OFF OR FALL ASLEEP WHILE LYING DOWN TO REST IN THE AFTERNOON WHEN CIRCUMSTANCES PERMIT: SLIGHT CHANCE OF DOZING
HOW LIKELY ARE YOU TO NOD OFF OR FALL ASLEEP WHEN YOU ARE A PASSENGER IN A CAR FOR AN HOUR WITHOUT A BREAK: SLIGHT CHANCE OF DOZING
HOW LIKELY ARE YOU TO NOD OFF OR FALL ASLEEP WHILE SITTING AND READING: SLIGHT CHANCE OF DOZING
HOW LIKELY ARE YOU TO NOD OFF OR FALL ASLEEP WHILE SITTING AND TALKING TO SOMEONE: WOULD NEVER DOZE
HOW LIKELY ARE YOU TO NOD OFF OR FALL ASLEEP WHILE SITTING QUIETLY AFTER LUNCH WITHOUT ALCOHOL: WOULD NEVER DOZE
HOW LIKELY ARE YOU TO NOD OFF OR FALL ASLEEP IN A CAR, WHILE STOPPED FOR A FEW MINUTES IN TRAFFIC: WOULD NEVER DOZE
HOW LIKELY ARE YOU TO NOD OFF OR FALL ASLEEP WHILE SITTING AND READING: SLIGHT CHANCE OF DOZING
ESS TOTAL SCORE: 4
HOW LIKELY ARE YOU TO NOD OFF OR FALL ASLEEP WHILE LYING DOWN TO REST IN THE AFTERNOON WHEN CIRCUMSTANCES PERMIT: SLIGHT CHANCE OF DOZING
HOW LIKELY ARE YOU TO NOD OFF OR FALL ASLEEP WHILE SITTING AND TALKING TO SOMEONE: WOULD NEVER DOZE
HOW LIKELY ARE YOU TO NOD OFF OR FALL ASLEEP WHILE SITTING INACTIVE IN A PUBLIC PLACE: WOULD NEVER DOZE
HOW LIKELY ARE YOU TO NOD OFF OR FALL ASLEEP WHILE SITTING QUIETLY AFTER LUNCH WITHOUT ALCOHOL: WOULD NEVER DOZE

## 2025-02-12 ENCOUNTER — OFFICE VISIT (OUTPATIENT)
Dept: PULMONOLOGY | Age: 70
End: 2025-02-12
Payer: MEDICARE

## 2025-02-12 VITALS
HEART RATE: 110 BPM | SYSTOLIC BLOOD PRESSURE: 142 MMHG | HEIGHT: 63 IN | BODY MASS INDEX: 33.13 KG/M2 | WEIGHT: 187 LBS | DIASTOLIC BLOOD PRESSURE: 92 MMHG | OXYGEN SATURATION: 99 %

## 2025-02-12 DIAGNOSIS — I10 PRIMARY HYPERTENSION: Chronic | ICD-10-CM

## 2025-02-12 DIAGNOSIS — I48.91 NEW ONSET ATRIAL FIBRILLATION (HCC): ICD-10-CM

## 2025-02-12 DIAGNOSIS — E66.811 CLASS 1 OBESITY DUE TO EXCESS CALORIES WITH SERIOUS COMORBIDITY AND BODY MASS INDEX (BMI) OF 32.0 TO 32.9 IN ADULT: ICD-10-CM

## 2025-02-12 DIAGNOSIS — E66.09 CLASS 1 OBESITY DUE TO EXCESS CALORIES WITH SERIOUS COMORBIDITY AND BODY MASS INDEX (BMI) OF 32.0 TO 32.9 IN ADULT: ICD-10-CM

## 2025-02-12 DIAGNOSIS — G47.33 OSA (OBSTRUCTIVE SLEEP APNEA): Primary | ICD-10-CM

## 2025-02-12 PROCEDURE — 3080F DIAST BP >= 90 MM HG: CPT | Performed by: NURSE PRACTITIONER

## 2025-02-12 PROCEDURE — 1159F MED LIST DOCD IN RCRD: CPT | Performed by: NURSE PRACTITIONER

## 2025-02-12 PROCEDURE — 1036F TOBACCO NON-USER: CPT | Performed by: NURSE PRACTITIONER

## 2025-02-12 PROCEDURE — 1160F RVW MEDS BY RX/DR IN RCRD: CPT | Performed by: NURSE PRACTITIONER

## 2025-02-12 PROCEDURE — 3077F SYST BP >= 140 MM HG: CPT | Performed by: NURSE PRACTITIONER

## 2025-02-12 PROCEDURE — 3017F COLORECTAL CA SCREEN DOC REV: CPT | Performed by: NURSE PRACTITIONER

## 2025-02-12 PROCEDURE — 1123F ACP DISCUSS/DSCN MKR DOCD: CPT | Performed by: NURSE PRACTITIONER

## 2025-02-12 PROCEDURE — G8399 PT W/DXA RESULTS DOCUMENT: HCPCS | Performed by: NURSE PRACTITIONER

## 2025-02-12 PROCEDURE — G8427 DOCREV CUR MEDS BY ELIG CLIN: HCPCS | Performed by: NURSE PRACTITIONER

## 2025-02-12 PROCEDURE — G8417 CALC BMI ABV UP PARAM F/U: HCPCS | Performed by: NURSE PRACTITIONER

## 2025-02-12 PROCEDURE — 1090F PRES/ABSN URINE INCON ASSESS: CPT | Performed by: NURSE PRACTITIONER

## 2025-02-12 PROCEDURE — 99214 OFFICE O/P EST MOD 30 MIN: CPT | Performed by: NURSE PRACTITIONER

## 2025-02-12 PROCEDURE — G2211 COMPLEX E/M VISIT ADD ON: HCPCS | Performed by: NURSE PRACTITIONER

## 2025-02-12 NOTE — PROGRESS NOTES
Dex Traylor [facility]  [address]  [address]  [place] [address]  P- [phone]  F- [phone]   [facility] [place] [facility]  [address]  [address]  [facility] [address]  Dept: [phone]  Dept Fax: [phone]  Loc: [phone]      Assessment/Plan:      1. JHON (obstructive sleep apnea)  Assessment & Plan:  Chronic-with progression/exacerbation: Reviewed and analyzed results of physiologic download from patient's machine and reviewed with patient.  Supplies and parts as needed for her machine.  These are medically necessary.  Limit caffeine use after 3pm. Based on the analyzed data will continue with current settings.  Patient states she is no longer going to use her machine.  Discussed the risks of untreated sleep apnea and discussed alternative treatments she could consider however she reports she is not interested in any treatment for her sleep apnea at this time.  She reports she will continue taking her CBD Gummies which have been helping with her sleep and will contact the office if and when she is ready to pursue treatment for sleep apnea.  Encouraged her to contact the office any questions or concerns.    Encouraged consistent use of her machine each night, all night.  Discussed the importance of treating JHON from a physiological standpoint.  Instructed not to drive unless had 4 hrs of effective therapy for her JHON the night before.  No driving when sleepy.  Did review the risks of under or untreated JHON including, but not limited to, higher risks of motor vehicle accidents, stroke, heart attacks, and death.  She understands and accepts all these risks.    2. New onset atrial fibrillation (HCC)  Assessment & Plan:  Chronic- Stable.  Discussed the importance of treating obstructive sleep apnea as part of the management of this disorder.  Cont any meds per PCP and other physicians.    3. Primary

## 2025-02-12 NOTE — ASSESSMENT & PLAN NOTE
Chronic-with progression/exacerbation: Reviewed and analyzed results of physiologic download from patient's machine and reviewed with patient.  Supplies and parts as needed for her machine.  These are medically necessary.  Limit caffeine use after 3pm. Based on the analyzed data will continue with current settings.  Patient states she is no longer going to use her machine.  Discussed the risks of untreated sleep apnea and discussed alternative treatments she could consider however she reports she is not interested in any treatment for her sleep apnea at this time.  She reports she will continue taking her CBD Gummies which have been helping with her sleep and will contact the office if and when she is ready to pursue treatment for sleep apnea.  Encouraged her to contact the office any questions or concerns.    Encouraged consistent use of her machine each night, all night.  Discussed the importance of treating JHON from a physiological standpoint.  Instructed not to drive unless had 4 hrs of effective therapy for her JHON the night before.  No driving when sleepy.  Did review the risks of under or untreated JHON including, but not limited to, higher risks of motor vehicle accidents, stroke, heart attacks, and death.  She understands and accepts all these risks.

## 2025-02-27 ENCOUNTER — TELEPHONE (OUTPATIENT)
Dept: INTERNAL MEDICINE CLINIC | Age: 70
End: 2025-02-27

## 2025-02-27 DIAGNOSIS — E78.2 MIXED HYPERLIPIDEMIA: Chronic | ICD-10-CM

## 2025-02-27 RX ORDER — PRAVASTATIN SODIUM 20 MG
20 TABLET ORAL EVERY EVENING
Qty: 90 TABLET | Refills: 1 | Status: SHIPPED | OUTPATIENT
Start: 2025-02-27

## 2025-04-02 ENCOUNTER — OFFICE VISIT (OUTPATIENT)
Dept: INTERNAL MEDICINE CLINIC | Age: 70
End: 2025-04-02
Payer: MEDICARE

## 2025-04-02 VITALS
WEIGHT: 188.2 LBS | BODY MASS INDEX: 33.34 KG/M2 | HEART RATE: 57 BPM | SYSTOLIC BLOOD PRESSURE: 132 MMHG | DIASTOLIC BLOOD PRESSURE: 78 MMHG | OXYGEN SATURATION: 98 %

## 2025-04-02 DIAGNOSIS — R42 ORTHOSTATIC DIZZINESS: Primary | ICD-10-CM

## 2025-04-02 DIAGNOSIS — R73.03 PREDIABETES: Chronic | ICD-10-CM

## 2025-04-02 DIAGNOSIS — I10 PRIMARY HYPERTENSION: Chronic | ICD-10-CM

## 2025-04-02 DIAGNOSIS — Z12.31 BREAST CANCER SCREENING BY MAMMOGRAM: ICD-10-CM

## 2025-04-02 DIAGNOSIS — E78.2 MIXED HYPERLIPIDEMIA: Chronic | ICD-10-CM

## 2025-04-02 DIAGNOSIS — K31.84 GASTROPARESIS: ICD-10-CM

## 2025-04-02 DIAGNOSIS — G47.33 OSA (OBSTRUCTIVE SLEEP APNEA): ICD-10-CM

## 2025-04-02 DIAGNOSIS — I48.0 PAROXYSMAL ATRIAL FIBRILLATION (HCC): ICD-10-CM

## 2025-04-02 PROBLEM — R11.10 INTRACTABLE VOMITING: Status: RESOLVED | Noted: 2022-04-22 | Resolved: 2025-04-02

## 2025-04-02 PROBLEM — Z87.448 HISTORY OF ACUTE RENAL FAILURE: Status: RESOLVED | Noted: 2022-08-04 | Resolved: 2025-04-02

## 2025-04-02 PROCEDURE — 99214 OFFICE O/P EST MOD 30 MIN: CPT | Performed by: INTERNAL MEDICINE

## 2025-04-02 PROCEDURE — 1160F RVW MEDS BY RX/DR IN RCRD: CPT | Performed by: INTERNAL MEDICINE

## 2025-04-02 PROCEDURE — 3017F COLORECTAL CA SCREEN DOC REV: CPT | Performed by: INTERNAL MEDICINE

## 2025-04-02 PROCEDURE — 3075F SYST BP GE 130 - 139MM HG: CPT | Performed by: INTERNAL MEDICINE

## 2025-04-02 PROCEDURE — 3078F DIAST BP <80 MM HG: CPT | Performed by: INTERNAL MEDICINE

## 2025-04-02 PROCEDURE — 1159F MED LIST DOCD IN RCRD: CPT | Performed by: INTERNAL MEDICINE

## 2025-04-02 PROCEDURE — 1123F ACP DISCUSS/DSCN MKR DOCD: CPT | Performed by: INTERNAL MEDICINE

## 2025-04-02 PROCEDURE — 1090F PRES/ABSN URINE INCON ASSESS: CPT | Performed by: INTERNAL MEDICINE

## 2025-04-02 PROCEDURE — 1036F TOBACCO NON-USER: CPT | Performed by: INTERNAL MEDICINE

## 2025-04-02 PROCEDURE — G8417 CALC BMI ABV UP PARAM F/U: HCPCS | Performed by: INTERNAL MEDICINE

## 2025-04-02 PROCEDURE — G8427 DOCREV CUR MEDS BY ELIG CLIN: HCPCS | Performed by: INTERNAL MEDICINE

## 2025-04-02 PROCEDURE — G8399 PT W/DXA RESULTS DOCUMENT: HCPCS | Performed by: INTERNAL MEDICINE

## 2025-04-02 SDOH — ECONOMIC STABILITY: INCOME INSECURITY: IN THE LAST 12 MONTHS, WAS THERE A TIME WHEN YOU WERE NOT ABLE TO PAY THE MORTGAGE OR RENT ON TIME?: NO

## 2025-04-02 SDOH — ECONOMIC STABILITY: TRANSPORTATION INSECURITY
IN THE PAST 12 MONTHS, HAS LACK OF TRANSPORTATION KEPT YOU FROM MEETINGS, WORK, OR FROM GETTING THINGS NEEDED FOR DAILY LIVING?: NO

## 2025-04-02 SDOH — ECONOMIC STABILITY: FOOD INSECURITY: WITHIN THE PAST 12 MONTHS, THE FOOD YOU BOUGHT JUST DIDN'T LAST AND YOU DIDN'T HAVE MONEY TO GET MORE.: NEVER TRUE

## 2025-04-02 SDOH — ECONOMIC STABILITY: TRANSPORTATION INSECURITY
IN THE PAST 12 MONTHS, HAS THE LACK OF TRANSPORTATION KEPT YOU FROM MEDICAL APPOINTMENTS OR FROM GETTING MEDICATIONS?: NO

## 2025-04-02 SDOH — ECONOMIC STABILITY: FOOD INSECURITY: WITHIN THE PAST 12 MONTHS, YOU WORRIED THAT YOUR FOOD WOULD RUN OUT BEFORE YOU GOT MONEY TO BUY MORE.: NEVER TRUE

## 2025-04-02 ASSESSMENT — ENCOUNTER SYMPTOMS
EYE DISCHARGE: 0
SHORTNESS OF BREATH: 0
COUGH: 0
NAUSEA: 1
CHEST TIGHTNESS: 0
VOMITING: 0
DIARRHEA: 1
COLOR CHANGE: 0
WHEEZING: 0
CONSTIPATION: 0
SORE THROAT: 0
BACK PAIN: 0
ABDOMINAL PAIN: 0

## 2025-04-02 ASSESSMENT — PATIENT HEALTH QUESTIONNAIRE - PHQ9
SUM OF ALL RESPONSES TO PHQ9 QUESTIONS 1 & 2: 0
2. FEELING DOWN, DEPRESSED OR HOPELESS: NOT AT ALL
SUM OF ALL RESPONSES TO PHQ QUESTIONS 1-9: 0
SUM OF ALL RESPONSES TO PHQ QUESTIONS 1-9: 0
1. LITTLE INTEREST OR PLEASURE IN DOING THINGS: NOT AT ALL
SUM OF ALL RESPONSES TO PHQ QUESTIONS 1-9: 0
1. LITTLE INTEREST OR PLEASURE IN DOING THINGS: NOT AT ALL
SUM OF ALL RESPONSES TO PHQ QUESTIONS 1-9: 0
2. FEELING DOWN, DEPRESSED OR HOPELESS: NOT AT ALL

## 2025-04-02 NOTE — ASSESSMENT & PLAN NOTE
Blood pressure remains stable and well-controlled on current dose of metoprolol, she is no longer taking potassium supplements, will update labs

## 2025-04-02 NOTE — ASSESSMENT & PLAN NOTE
As noted above, she did see cardiology at [facility] 3 months ago, recommended yearly follow-up.  Remains on metoprolol and encouraged to use the [employer] edouard

## 2025-04-02 NOTE — ASSESSMENT & PLAN NOTE
No longer using the BiPAP, she will start following back again at the sleep center in the near future because she is interested in trying the inspire device if covered by her insurance.  She could not tolerate CPAP or BiPAP, symptoms improved with weight loss

## 2025-04-02 NOTE — ASSESSMENT & PLAN NOTE
Symptoms do not appear to be related to underlying A-fib and unlikely vertigo related.  Mostly when changing position from sitting to standing.  Advised patient to use the [employer] device or phone edouard that was recommended by cardiology since she did not want to use a smart watch to track her blood pressure and heart rhythm.  She does maintain that she drinks enough water, advised changing positions slowly to avoid falls and injury, can consider midodrine if there is actually orthostasis although patient reports she does not want to be taking any more pills

## 2025-04-02 NOTE — ASSESSMENT & PLAN NOTE
Update labs and adjust pravastatin dose if needed otherwise continue same along with continuing attempts to adhere to healthy low-fat low-carb diet with active lifestyle

## 2025-04-02 NOTE — PROGRESS NOTES
ASSESSMENT/PLAN:  1. Orthostatic dizziness  Assessment & Plan:  Symptoms do not appear to be related to underlying A-fib and unlikely vertigo related.  Mostly when changing position from sitting to standing.  Advised patient to use the [employer] device or phone edouard that was recommended by cardiology since she did not want to use a smart watch to track her blood pressure and heart rhythm.  She does maintain that she drinks enough water, advised changing positions slowly to avoid falls and injury, can consider midodrine if there is actually orthostasis although patient reports she does not want to be taking any more pills   2. Paroxysmal atrial fibrillation (HCC)  Assessment & Plan:  As noted above, she did see cardiology at [facility] 3 months ago, recommended yearly follow-up.  Remains on metoprolol and encouraged to use the [employer] edouard   3. Prediabetes  Assessment & Plan:  This has resolved with weight loss as per her last lab work, will update labs, reinforced recommendations to adhere to healthy low-carb diet and active lifestyle   Orders:  -     TSH reflex to FT4; Future  -     CBC; Future  -     Comprehensive Metabolic Panel; Future  -     Hemoglobin A1C; Future  -     Lipid Panel; Future  4. Primary hypertension  Assessment & Plan:  Blood pressure remains stable and well-controlled on current dose of metoprolol, she is no longer taking potassium supplements, will update labs   Orders:  -     CBC; Future  -     Comprehensive Metabolic Panel; Future  5. Mixed hyperlipidemia  Assessment & Plan:  Update labs and adjust pravastatin dose if needed otherwise continue same along with continuing attempts to adhere to healthy low-fat low-carb diet with active lifestyle   Orders:  -     TSH reflex to FT4; Future  -     CBC; Future  -     Comprehensive Metabolic Panel; Future  -     Lipid Panel; Future  6. Gastroparesis  Assessment & Plan:  Symptoms improved following gastric myotomy done last year, continues to have chronic

## 2025-04-02 NOTE — ASSESSMENT & PLAN NOTE
Symptoms improved following gastric myotomy done last year, continues to have chronic nausea but reports this has been manageable and not needing any medications , no further weight loss, remains on low-dose omeprazole and will follow-up with GI as needed

## 2025-04-02 NOTE — ASSESSMENT & PLAN NOTE
This has resolved with weight loss as per her last lab work, will update labs, reinforced recommendations to adhere to healthy low-carb diet and active lifestyle

## 2025-04-04 DIAGNOSIS — I10 PRIMARY HYPERTENSION: Chronic | ICD-10-CM

## 2025-04-04 DIAGNOSIS — E78.2 MIXED HYPERLIPIDEMIA: Chronic | ICD-10-CM

## 2025-04-04 DIAGNOSIS — R73.03 PREDIABETES: Chronic | ICD-10-CM

## 2025-04-04 LAB
ALBUMIN SERPL-MCNC: 4.2 G/DL (ref 3.4–5)
ALBUMIN/GLOB SERPL: 1.8 {RATIO} (ref 1.1–2.2)
ALP SERPL-CCNC: 91 U/L (ref 40–129)
ALT SERPL-CCNC: 11 U/L (ref 10–40)
ANION GAP SERPL CALCULATED.3IONS-SCNC: 10 MMOL/L (ref 3–16)
AST SERPL-CCNC: 13 U/L (ref 15–37)
BILIRUB SERPL-MCNC: 0.5 MG/DL (ref 0–1)
BUN SERPL-MCNC: 22 MG/DL (ref 7–20)
CALCIUM SERPL-MCNC: 10.1 MG/DL (ref 8.3–10.6)
CHLORIDE SERPL-SCNC: 105 MMOL/L (ref 99–110)
CHOLEST SERPL-MCNC: 221 MG/DL (ref 0–199)
CO2 SERPL-SCNC: 26 MMOL/L (ref 21–32)
CREAT SERPL-MCNC: 1 MG/DL (ref 0.6–1.2)
DEPRECATED RDW RBC AUTO: 13.6 % (ref 12.4–15.4)
EST. AVERAGE GLUCOSE BLD GHB EST-MCNC: 102.5 MG/DL
GFR SERPLBLD CREATININE-BSD FMLA CKD-EPI: 60 ML/MIN/{1.73_M2}
GLUCOSE SERPL-MCNC: 88 MG/DL (ref 70–99)
HBA1C MFR BLD: 5.2 %
HCT VFR BLD AUTO: 46.1 % (ref 36–48)
HDLC SERPL-MCNC: 53 MG/DL (ref 40–60)
HGB BLD-MCNC: 15.2 G/DL (ref 12–16)
LDLC SERPL CALC-MCNC: 146 MG/DL
MCH RBC QN AUTO: 28.9 PG (ref 26–34)
MCHC RBC AUTO-ENTMCNC: 32.9 G/DL (ref 31–36)
MCV RBC AUTO: 87.8 FL (ref 80–100)
PLATELET # BLD AUTO: 248 K/UL (ref 135–450)
PMV BLD AUTO: 8.8 FL (ref 5–10.5)
POTASSIUM SERPL-SCNC: 4.8 MMOL/L (ref 3.5–5.1)
PROT SERPL-MCNC: 6.5 G/DL (ref 6.4–8.2)
RBC # BLD AUTO: 5.26 M/UL (ref 4–5.2)
SODIUM SERPL-SCNC: 141 MMOL/L (ref 136–145)
TRIGL SERPL-MCNC: 109 MG/DL (ref 0–150)
TSH SERPL DL<=0.005 MIU/L-ACNC: 1.72 UIU/ML (ref 0.27–4.2)
VLDLC SERPL CALC-MCNC: 22 MG/DL
WBC # BLD AUTO: 10.6 K/UL (ref 4–11)

## 2025-04-07 ENCOUNTER — RESULTS FOLLOW-UP (OUTPATIENT)
Dept: INTERNAL MEDICINE CLINIC | Age: 70
End: 2025-04-07

## 2025-05-30 DIAGNOSIS — I10 PRIMARY HYPERTENSION: ICD-10-CM

## 2025-05-30 RX ORDER — LOSARTAN POTASSIUM 50 MG/1
50 TABLET ORAL DAILY
Qty: 30 TABLET | Refills: 3 | OUTPATIENT
Start: 2025-05-30

## (undated) DEVICE — SYRINGE MED 30ML STD CLR PLAS LUERLOCK TIP N CTRL DISP

## (undated) DEVICE — Device

## (undated) DEVICE — BW-412T DISP COMBO CLEANING BRUSH: Brand: SINGLE USE COMBINATION CLEANING BRUSH

## (undated) DEVICE — CORD ES L10FT MPLR LAP

## (undated) DEVICE — TRAP SPEC RETRV CLR PLAS POLYP IN LN SUCT QUIK CTCH

## (undated) DEVICE — SURGICAL PROC PACK SHT WEST V4

## (undated) DEVICE — SOLUTION IRRIG BSS ST 500ML

## (undated) DEVICE — SHEET,DRAPE,53X77,STERILE: Brand: MEDLINE

## (undated) DEVICE — Device: Brand: MEDEX

## (undated) DEVICE — SYRINGE MEDICAL 3ML CLEAR PLASTIC STANDARD NON CONTROL LUERLOCK TIP DISPOSABLE

## (undated) DEVICE — SYRINGE TB 1ML NDL 25GA L0.625IN PLAS SLIP TIP CONVENTIONAL

## (undated) DEVICE — STAPLER INT DIA5MM 25 ABSRB STRP FIX DISP FOR HERN MESH

## (undated) DEVICE — PMI DISPOSABLE PUNCTURE CLOSURE DEVICE / SUTURE GRASPER: Brand: PMI

## (undated) DEVICE — SYRINGE MED 10ML TRNSLUC BRL PLUNG BLK MRK POLYPR CTRL

## (undated) DEVICE — VALVE SUCTION AIR H2O SET ORCA POD + DISP

## (undated) DEVICE — SUTURE MCRYL + SZ 4-0 L27IN ABSRB UD L19MM PS-2 3/8 CIR MCP426H

## (undated) DEVICE — ENDOSCOPIC KIT 6X3/16 FT COLON W/ 1.1 OZ 2 GWN W/O BRSH

## (undated) DEVICE — BLADE CLIPPER GEN PURP NS

## (undated) DEVICE — SUTURE VCRL + SZ 0 L27IN ABSRB VLT L26MM UR-6 5/8 CIR VCP603H

## (undated) DEVICE — FORCEPS BX L240CM WRK CHN 2.8MM STD CAP W/ NDL MIC MESH

## (undated) DEVICE — SOLUTION IV IRRIG WATER 500ML POUR BRL ST 2F7113

## (undated) DEVICE — 60 ML SYRINGE,REGULAR TIP: Brand: MONOJECT

## (undated) DEVICE — ADHESIVE SKIN CLSR 0.7ML TOP DERMBND ADV

## (undated) DEVICE — SUTURE COAT VCRL SZ 0 L18IN ABSRB UD W/O NDL POLYGLACTIN J112T

## (undated) DEVICE — SOLUTION IRRIG 500ML STRL H2O NONPYROGENIC

## (undated) DEVICE — DRAPE,LAP,CHOLE,W/TROUGHS,STERILE: Brand: MEDLINE

## (undated) DEVICE — GLOVE SURG SZ 6.5 L11.2IN FNGR THK9.8MIL STRW LTX POLYMER

## (undated) DEVICE — GLOVE SURG SZ 75 STD WHT LTX SYN POLYMER BEAD REINF ANTI RL

## (undated) DEVICE — WIPE INSTR W73XL73CM VISITEC

## (undated) DEVICE — MASC TURNOVER KIT: Brand: MEDLINE INDUSTRIES, INC.

## (undated) DEVICE — SNARES COLD OVAL 10MM THIN

## (undated) DEVICE — HYPODERMIC SAFETY NEEDLE: Brand: MAGELLAN

## (undated) DEVICE — MOUTHPIECE ENDOSCP L CTRL OPN AND SIDE PORTS DISP

## (undated) DEVICE — GOWN SIRUS NONREIN XL W/TWL: Brand: MEDLINE INDUSTRIES, INC.

## (undated) DEVICE — BLANKET WRM W29.9XL79.1IN UP BODY FORC AIR MISTRAL-AIR

## (undated) DEVICE — LAPAROSCOPY PACK: Brand: MEDLINE INDUSTRIES, INC.

## (undated) DEVICE — AIR/WATER CLEANING ADAPTER FOR OLYMPUS® GI ENDOSCOPE: Brand: BULLDOG®